# Patient Record
Sex: FEMALE | Race: BLACK OR AFRICAN AMERICAN | Employment: UNEMPLOYED | URBAN - METROPOLITAN AREA
[De-identification: names, ages, dates, MRNs, and addresses within clinical notes are randomized per-mention and may not be internally consistent; named-entity substitution may affect disease eponyms.]

---

## 2017-01-16 ENCOUNTER — HOSPITAL ENCOUNTER (OUTPATIENT)
Dept: GENERAL RADIOLOGY | Age: 32
Discharge: HOME | End: 2017-01-16
Payer: MEDICAID

## 2017-01-16 ENCOUNTER — OFFICE VISIT (OUTPATIENT)
Dept: FAMILY MEDICINE CLINIC | Facility: CLINIC | Age: 32
End: 2017-01-16

## 2017-01-16 VITALS
WEIGHT: 244.3 LBS | HEIGHT: 63 IN | SYSTOLIC BLOOD PRESSURE: 110 MMHG | RESPIRATION RATE: 16 BRPM | BODY MASS INDEX: 43.29 KG/M2 | HEART RATE: 86 BPM | TEMPERATURE: 98.7 F | OXYGEN SATURATION: 98 % | DIASTOLIC BLOOD PRESSURE: 82 MMHG

## 2017-01-16 DIAGNOSIS — R05.9 COUGH: ICD-10-CM

## 2017-01-16 DIAGNOSIS — J30.9 CHRONIC ALLERGIC RHINITIS: ICD-10-CM

## 2017-01-16 DIAGNOSIS — J20.9 BRONCHITIS WITH BRONCHOSPASM: Primary | ICD-10-CM

## 2017-01-16 DIAGNOSIS — J20.9 BRONCHITIS WITH BRONCHOSPASM: ICD-10-CM

## 2017-01-16 PROCEDURE — 71020 XR CHEST PA LAT: CPT

## 2017-01-16 RX ORDER — ALBUTEROL SULFATE 90 UG/1
2 AEROSOL, METERED RESPIRATORY (INHALATION)
Qty: 1 INHALER | Refills: 0 | Status: SHIPPED | OUTPATIENT
Start: 2017-01-16 | End: 2020-06-16

## 2017-01-16 RX ORDER — CLARITHROMYCIN 500 MG/1
500 TABLET, FILM COATED ORAL 2 TIMES DAILY
Qty: 20 TAB | Refills: 0 | Status: SHIPPED | OUTPATIENT
Start: 2017-01-16 | End: 2017-01-26

## 2017-01-16 RX ORDER — CODEINE PHOSPHATE AND GUAIFENESIN 10; 100 MG/5ML; MG/5ML
5 SOLUTION ORAL
Qty: 180 ML | Refills: 0 | Status: SHIPPED | OUTPATIENT
Start: 2017-01-16 | End: 2017-03-17

## 2017-01-16 RX ORDER — FLUTICASONE PROPIONATE 50 MCG
SPRAY, SUSPENSION (ML) NASAL
Qty: 1 BOTTLE | Refills: 2 | Status: SHIPPED | OUTPATIENT
Start: 2017-01-16 | End: 2017-04-13 | Stop reason: SDUPTHER

## 2017-01-16 NOTE — PROGRESS NOTES
Caitlin Gaitan is a 36 y.o.  female presents today for same day sick visit for chest congestion and cough. Pt is in Room # 4.      1. Have you been to the ER, urgent care clinic since your last visit? Hospitalized since your last visit? No    2. Have you seen or consulted any other health care providers outside of the [address] [place] [address] since your last visit? Include any pap smears or colon screening.  No

## 2017-01-16 NOTE — PROGRESS NOTES
SUBJECTIVE:  Regine Yousif is a 36y.o. year old female   Chief Complaint   Patient presents with    Cough       History of Present Illness:     She has had a bad worsening productive cough for the last 2-3 weeks with mild dyspnea and pain w/o chills or fever. The cough is causing problem sleeping. Her rhinitis was not bothering her since on Flonase. She ran out two months ago. Past Medical History   Diagnosis Date    Deviated septum 2012    Multiple nasal polyps 2012     removed      Past Surgical History   Procedure Laterality Date    Hx wisdom teeth extraction  2010    Hx tonsillectomy  2008        Current Outpatient Prescriptions   Medication Sig    PARoxetine (PAXIL) 20 mg tablet TAKE 1 TABLET BY MOUTH EVERY DAY    ethinyl estradiol-etonogestrel (NUVARING) 0.12-0.015 mg/24 hr vaginal ring Insert one device on vagina every four weeks, remove after three weeks.  fluticasone (FLONASE) 50 mcg/actuation nasal spray SHAKE WELL AND USE 2 SPRAYS IN EACH NOSTRIL EVERY DAY     No current facility-administered medications for this visit. No Known Allergies     Family History   Problem Relation Age of Onset    Diabetes Mother         Social History   Substance Use Topics    Smoking status: Never Smoker    Smokeless tobacco: Never Used    Alcohol use No       Review of Systems:   Constitutional: No fever, chills, night sweats, malaise, dizziness. Ear/Nose/Throat: No ear/ throat/ sinus pain, lesions, unusual discharge, new speaking or hearing problems, nose bleed  Cardiovascular: No angina, palpitations, PND, orthopnea,   lightheadedness, edema, claudication. Respiratory: No hemoptysis. Skin: no rash. Gastrointestinal: No nausea/ vomiting, bowel habit change, pain, ANA symptoms, melena, hematochezia, anorexia. Neurological: No seizures, numbness, dizziness, speech abnormality, incontinence. Psychiatric: No agitation, confusion/disorientation, suicidal or homicidal ideation. Musculoskeletal: no joint swelling, instability, focal weakness, stiffness/rigidity, radicular pain. OBJECTIVE:  Physical Exam:   Constitutional: General Appearance:  well developed,obese, nontoxic, in no acute distress. Visit Vitals    /82 (BP 1 Location: Left arm, BP Patient Position: Sitting)    Pulse 86    Temp 98.7 °F (37.1 °C) (Oral)    Resp 16    Ht 5' 3\" (1.6 m)    Wt 244 lb 4.8 oz (110.8 kg)    SpO2 98%    BMI 43.28 kg/m2   Otoscopic Examination: external auditory canals are clear; tympanic membranes are dull. Nasal Cavity: mildly swollen mucosa & turbinates. Maxillas are not tender w/o redness or heat. Throat: clear tonsils, oropharynx, posterior pharynx. Pulmonary: Respiratory effort: normal; no dyspnea, no retractions, no accessory muscle use. Auscultation: no rales, diffuse rhonchi and wheeze; no rubs. Rhonchi and wheeze improved after HHA Rx w/ Duoneb  Cardiovascular: Palpation: PMI not displaced or enlarged, no thrills or heaves. Auscultation: RRR; no murmur, rubs or gallops. Extremities: no edema, no active varicosity. Gastrointestinal: Normal bowel sounds. No masses; no tenderness; no rebound/rigidity; no CVA tenderness. No hepatosplenomegaly. Psychiatric: Oriented to time, place and person. Skin: no rash  Musculoskeletal: NC/AT. Neck supple. ASSESSMENT:     1. Bronchitis with bronchospasm    2. Cough    3. Chronic allergic rhinitis        PLAN:     Orders Placed This Encounter    XR CHEST PA LAT    fluticasone (FLONASE) 50 mcg/actuation nasal spray    clarithromycin (BIAXIN) 500 mg tablet    guaiFENesin-codeine (ROBITUSSIN AC) 100-10 mg/5 mL solution    albuterol (PROVENTIL HFA, VENTOLIN HFA, PROAIR HFA) 90 mcg/actuation inhaler      Pharmacologic Management: Medications reviewed with the patient. Robitussin AC 1 tsp tid prn; Biaxin 500 bid, Flonase 2 daily, Albuterol HFA 2 qid prn. Discussed DDx, follow-up & work-up.   Discussed risk/benefit & side effect of treatment. Follow up visit as planned, prn sooner. PRN to ER. Rest and push fluids. Health risk from non adherence discussed. Patient/ mother voiced understanding. Follow-up Disposition:  Return in about 1 month (around 2/16/2017) for fasting.     Radha Syed MD

## 2017-01-17 ENCOUNTER — TELEPHONE (OUTPATIENT)
Dept: FAMILY MEDICINE CLINIC | Facility: CLINIC | Age: 32
End: 2017-01-17

## 2017-01-20 NOTE — TELEPHONE ENCOUNTER
Spoke with pt in regards to CXR results. Relayed 's notes. Pt acknowledges understanding and voices no concerns at this time.

## 2017-03-17 ENCOUNTER — OFFICE VISIT (OUTPATIENT)
Dept: FAMILY MEDICINE CLINIC | Facility: CLINIC | Age: 32
End: 2017-03-17

## 2017-03-17 ENCOUNTER — HOSPITAL ENCOUNTER (OUTPATIENT)
Dept: LAB | Age: 32
Discharge: HOME | End: 2017-03-17

## 2017-03-17 VITALS
DIASTOLIC BLOOD PRESSURE: 78 MMHG | WEIGHT: 245 LBS | BODY MASS INDEX: 43.41 KG/M2 | TEMPERATURE: 97.6 F | OXYGEN SATURATION: 98 % | RESPIRATION RATE: 18 BRPM | SYSTOLIC BLOOD PRESSURE: 116 MMHG | HEART RATE: 86 BPM | HEIGHT: 63 IN

## 2017-03-17 DIAGNOSIS — R73.09 ELEVATED GLUCOSE: ICD-10-CM

## 2017-03-17 DIAGNOSIS — J30.9 CHRONIC ALLERGIC RHINITIS: ICD-10-CM

## 2017-03-17 DIAGNOSIS — F32.A DEPRESSION, UNSPECIFIED DEPRESSION TYPE: Primary | ICD-10-CM

## 2017-03-17 DIAGNOSIS — G44.229 CHRONIC TENSION-TYPE HEADACHE, NOT INTRACTABLE: ICD-10-CM

## 2017-03-17 PROCEDURE — 99001 SPECIMEN HANDLING PT-LAB: CPT | Performed by: FAMILY MEDICINE

## 2017-03-17 NOTE — PROGRESS NOTES
1. Have you been to the ER, urgent care clinic since your last visit? Hospitalized since your last visit? No    2. Have you seen or consulted any other health care providers outside of the [address] [place] [address] since your last visit? Include any pap smears or colon screening.  No

## 2017-03-17 NOTE — PROGRESS NOTES
SUBJECTIVE:  Nino Liriano is a 36y.o. year old female   Chief Complaint   Patient presents with    Depression    Allergic Rhinitis    Follow-up     cough       History of Present Illness:   She is here for follow up. No side effects of Paxil. Sleeping better and mood is better. No depression. She is following diet and exercise for elevated BS and lipids. .  Her headache is controlled. Her rhinitis is not bothering her since on Flonase. No more cough or wheeze. Her back pain has not returned    Past Medical History:   Diagnosis Date    Deviated septum 2012    Multiple nasal polyps 2012    removed      Past Surgical History:   Procedure Laterality Date    HX TONSILLECTOMY  2008    HX WISDOM TEETH EXTRACTION  2010        Current Outpatient Prescriptions   Medication Sig    fluticasone (FLONASE) 50 mcg/actuation nasal spray SHAKE WELL AND USE 2 SPRAYS IN EACH NOSTRIL EVERY DAY    albuterol (PROVENTIL HFA, VENTOLIN HFA, PROAIR HFA) 90 mcg/actuation inhaler Take 2 Puffs by inhalation every six (6) hours as needed for Wheezing.  PARoxetine (PAXIL) 20 mg tablet TAKE 1 TABLET BY MOUTH EVERY DAY    ethinyl estradiol-etonogestrel (NUVARING) 0.12-0.015 mg/24 hr vaginal ring Insert one device on vagina every four weeks, remove after three weeks. No current facility-administered medications for this visit. No Known Allergies     Family History   Problem Relation Age of Onset    Diabetes Mother         Social History   Substance Use Topics    Smoking status: Never Smoker    Smokeless tobacco: Never Used    Alcohol use No       Review of Systems:   Constitutional: No fever, chills, night sweats, malaise, dizziness. Ear/Nose/Throat: No ear/ throat/ sinus pain, lesions, unusual discharge, new speaking or hearing problems, nose bleed  Cardiovascular: No angina, palpitations, PND, orthopnea, lightheadedness, edema, claudication.     Respiratory: No dyspnea, wheeze, pleurisy, hemoptysis, unusual cough or sputum. Gastrointestinal: No nausea/ vomiting, bowel habit change, pain, ANA symptoms, melena, hematochezia, anorexia. Neurological: No seizures, numbness, dizziness, speech abnormality, incontinence. Psychiatric: No agitation, confusion/disorientation, suicidal or homicidal ideation. Endocrine: Denies any other complaints. Musculoskeletal: no joint swelling, instability, focal weakness, stiffness/rigidity, radicular pain. OBJECTIVE:  Physical Exam:   Constitutional: General Appearance:  well developed,obese, nontoxic, in no acute distress. Visit Vitals    /78 (BP 1 Location: Left arm, BP Patient Position: Sitting)    Pulse 86    Temp 97.6 °F (36.4 °C) (Oral)    Resp 18    Ht 5' 3\" (1.6 m)    Wt 245 lb (111.1 kg)    SpO2 98%    BMI 43.4 kg/m2     Eyes[de-identified] Pupils & Irises: normal size; equal, round and reactive to light. EOMI.    ENT: Ear/Nose/Throat: No ear/ throat/ sinus pain, lesions, unusual discharge, new speaking or hearing problems, nose bleed    Neck Area[de-identified] Neck: without masses, symmetric, trachea is midline. Thyroid:  without masses or tenderness. Pulmonary: Respiratory effort: normal; no dyspnea, no retractions, no accessory muscle use. Auscultation: normal & symmetrical air exchange; no rales, no rhonchi, no wheeze; no rubs    Cardiovascular: Palpation: PMI not displaced or enlarged, no thrills or heaves. Auscultation: RRR; no murmur, rubs or gallops. Extremities: no edema, no active varicosity. Gastrointestinal: Normal bowel sounds. No masses; no tenderness; no rebound/rigidity; no CVA tenderness. No hepatosplenomegaly. Psychiatric: Oriented to time, place and person. Normal mood, no agitation or anxiety. Improving affect. Pleasant and cooperative. Neurologic: CN I to XII: intact. DTR: symmetrical & normal.  SLR- neg    Musculoskeletal: NC/AT. Neck supple.     Lab Results   Component Value Date/Time    TSH 2.630 08/15/2016 08:35 AM Lab Results   Component Value Date/Time    WBC 6.4 02/23/2016 11:24 AM    HGB 12.4 02/23/2016 11:24 AM    HCT 37.0 02/23/2016 11:24 AM    PLATELET 166 50/94/1648 11:24 AM    MCV 88 02/23/2016 11:24 AM     Lab Results   Component Value Date/Time    Sodium 143 02/23/2016 11:24 AM    Potassium 4.4 02/23/2016 11:24 AM    Chloride 105 02/23/2016 11:24 AM    CO2 23 02/23/2016 11:24 AM    Glucose 117 08/15/2016 08:35 AM    BUN 10 02/23/2016 11:24 AM    Creatinine 0.75 02/23/2016 11:24 AM    BUN/Creatinine ratio 13 02/23/2016 11:24 AM    GFR est  02/23/2016 11:24 AM    GFR est non- 02/23/2016 11:24 AM    Calcium 9.6 02/23/2016 11:24 AM    Bilirubin, total 0.2 02/23/2016 11:24 AM    AST (SGOT) 11 02/23/2016 11:24 AM    Alk. phosphatase 44 02/23/2016 11:24 AM    Protein, total 6.9 02/23/2016 11:24 AM    Albumin 4.0 02/23/2016 11:24 AM    A-G Ratio 1.4 02/23/2016 11:24 AM    ALT (SGPT) 11 02/23/2016 11:24 AM     Lab Results   Component Value Date/Time    Cholesterol, total 139 05/13/2016 08:45 AM    HDL Cholesterol 55 05/13/2016 08:45 AM    LDL, calculated 63 05/13/2016 08:45 AM    VLDL, calculated 21 05/13/2016 08:45 AM    Triglyceride 106 05/13/2016 08:45 AM     Lab Results   Component Value Date/Time    Hemoglobin A1c 6.4 08/15/2016 08:35 AM       ASSESSMENT:     1. Depression, unspecified depression type    2. Elevated glucose    3. Chronic allergic rhinitis    4. Chronic tension-type headache, not intractable, improved        PLAN:     Orders Placed This Encounter    CBC WITH AUTOMATED DIFF    METABOLIC PANEL, COMPREHENSIVE    HEMOGLOBIN A1C WITH EAG    URINALYSIS W/ RFLX MICROSCOPIC    LIPID PANEL    TETANUS, DIPHTHERIA TOX,ADULT (TETANUS-DIPHTHERIA TOXOIDS-TD) 2-2 Lf unit/0.5 mL injection   Per patient, has had Tdap since age 8 and last Td 10 years ago. Pharmacologic Management: Medications reviewed with the patient. No change    Discussed DDx, follow-up & work-up.   Discussed risk/benefit & side effect of treatment. Follow up visit as planned, prn sooner. Low ADA salt diet, weightloss & graduated excecise. Health risk from non adherence discussed. Patient/ mother voiced understanding. Follow-up Disposition:  Return in about 6 months (around 9/17/2017).     Catalina Wang MD

## 2017-03-18 LAB
ALBUMIN SERPL-MCNC: 4.1 G/DL (ref 3.5–5.5)
ALBUMIN/GLOB SERPL: 1.6 {RATIO} (ref 1.2–2.2)
ALP SERPL-CCNC: 56 IU/L (ref 39–117)
ALT SERPL-CCNC: 13 IU/L (ref 0–32)
APPEARANCE UR: CLEAR
AST SERPL-CCNC: 11 IU/L (ref 0–40)
BACTERIA #/AREA URNS HPF: ABNORMAL /[HPF]
BASOPHILS # BLD AUTO: 0 X10E3/UL (ref 0–0.2)
BASOPHILS NFR BLD AUTO: 0 %
BILIRUB SERPL-MCNC: <0.2 MG/DL (ref 0–1.2)
BILIRUB UR QL STRIP: NEGATIVE
BUN SERPL-MCNC: 8 MG/DL (ref 6–20)
BUN/CREAT SERPL: 12 (ref 8–20)
CALCIUM SERPL-MCNC: 9.1 MG/DL (ref 8.7–10.2)
CASTS URNS QL MICRO: ABNORMAL /LPF
CHLORIDE SERPL-SCNC: 101 MMOL/L (ref 96–106)
CHOLEST SERPL-MCNC: 140 MG/DL (ref 100–199)
CO2 SERPL-SCNC: 24 MMOL/L (ref 18–29)
COLOR UR: YELLOW
CREAT SERPL-MCNC: 0.67 MG/DL (ref 0.57–1)
EOSINOPHIL # BLD AUTO: 0 X10E3/UL (ref 0–0.4)
EOSINOPHIL NFR BLD AUTO: 1 %
EPI CELLS #/AREA URNS HPF: ABNORMAL /HPF
ERYTHROCYTE [DISTWIDTH] IN BLOOD BY AUTOMATED COUNT: 13.7 % (ref 12.3–15.4)
EST. AVERAGE GLUCOSE BLD GHB EST-MCNC: 137 MG/DL
GLOBULIN SER CALC-MCNC: 2.6 G/DL (ref 1.5–4.5)
GLUCOSE SERPL-MCNC: 96 MG/DL (ref 65–99)
GLUCOSE UR QL: NEGATIVE
HBA1C MFR BLD: 6.4 % (ref 4.8–5.6)
HCT VFR BLD AUTO: 36.3 % (ref 34–46.6)
HDLC SERPL-MCNC: 56 MG/DL
HGB BLD-MCNC: 11.9 G/DL (ref 11.1–15.9)
HGB UR QL STRIP: NEGATIVE
IMM GRANULOCYTES # BLD: 0 X10E3/UL (ref 0–0.1)
IMM GRANULOCYTES NFR BLD: 0 %
INTERPRETATION, 910389: NORMAL
KETONES UR QL STRIP: NEGATIVE
LDLC SERPL CALC-MCNC: 64 MG/DL (ref 0–99)
LEUKOCYTE ESTERASE UR QL STRIP: ABNORMAL
LYMPHOCYTES # BLD AUTO: 2.4 X10E3/UL (ref 0.7–3.1)
LYMPHOCYTES NFR BLD AUTO: 38 %
MCH RBC QN AUTO: 27.9 PG (ref 26.6–33)
MCHC RBC AUTO-ENTMCNC: 32.8 G/DL (ref 31.5–35.7)
MCV RBC AUTO: 85 FL (ref 79–97)
MICRO URNS: ABNORMAL
MONOCYTES # BLD AUTO: 0.4 X10E3/UL (ref 0.1–0.9)
MONOCYTES NFR BLD AUTO: 7 %
MUCOUS THREADS URNS QL MICRO: PRESENT
NEUTROPHILS # BLD AUTO: 3.3 X10E3/UL (ref 1.4–7)
NEUTROPHILS NFR BLD AUTO: 54 %
NITRITE UR QL STRIP: NEGATIVE
PH UR STRIP: 6.5 [PH] (ref 5–7.5)
PLATELET # BLD AUTO: 300 X10E3/UL (ref 150–379)
POTASSIUM SERPL-SCNC: 4.5 MMOL/L (ref 3.5–5.2)
PROT SERPL-MCNC: 6.7 G/DL (ref 6–8.5)
PROT UR QL STRIP: NEGATIVE
RBC # BLD AUTO: 4.27 X10E6/UL (ref 3.77–5.28)
RBC #/AREA URNS HPF: ABNORMAL /HPF
SODIUM SERPL-SCNC: 138 MMOL/L (ref 134–144)
SP GR UR: 1.02 (ref 1–1.03)
TRIGL SERPL-MCNC: 99 MG/DL (ref 0–149)
UROBILINOGEN UR STRIP-MCNC: 0.2 MG/DL (ref 0.2–1)
VLDLC SERPL CALC-MCNC: 20 MG/DL (ref 5–40)
WBC # BLD AUTO: 6.2 X10E3/UL (ref 3.4–10.8)
WBC #/AREA URNS HPF: ABNORMAL /HPF

## 2017-03-20 ENCOUNTER — TELEPHONE (OUTPATIENT)
Dept: FAMILY MEDICINE CLINIC | Facility: CLINIC | Age: 32
End: 2017-03-20

## 2017-03-20 DIAGNOSIS — N39.0 URINARY TRACT INFECTION WITHOUT HEMATURIA, SITE UNSPECIFIED: Primary | ICD-10-CM

## 2017-03-20 NOTE — TELEPHONE ENCOUNTER
Called pt and left message. Call back number left and I myself or one of the other nurses will attempt to contact again. The call was to inform pt of lab results.

## 2017-03-21 NOTE — TELEPHONE ENCOUNTER
HgbA1c was unchanged at 6.4. U/A shows possible infection.  Will need urine for C&S. Called pt and left message. Call back number left and I myself or one of the other nurses will attempt to contact again.     The call was to inform pt results

## 2017-03-22 NOTE — TELEPHONE ENCOUNTER
Spoke with pt in regards to results. Pt acknowledges understanding and voices no concerns at this time. VORB:Urine culture. Brooklynn Veliz MD/Carmina Taylor, LPN

## 2017-03-24 LAB — BACTERIA UR CULT: NORMAL

## 2017-04-13 RX ORDER — FLUTICASONE PROPIONATE 50 MCG
SPRAY, SUSPENSION (ML) NASAL
Qty: 1 BOTTLE | Refills: 2 | Status: SHIPPED | OUTPATIENT
Start: 2017-04-13 | End: 2017-05-08 | Stop reason: SDUPTHER

## 2017-04-20 ENCOUNTER — OFFICE VISIT (OUTPATIENT)
Dept: INTERNAL MEDICINE CLINIC | Age: 32
End: 2017-04-20

## 2017-04-20 VITALS
RESPIRATION RATE: 16 BRPM | DIASTOLIC BLOOD PRESSURE: 94 MMHG | HEART RATE: 81 BPM | TEMPERATURE: 97.2 F | WEIGHT: 246.6 LBS | BODY MASS INDEX: 43.7 KG/M2 | OXYGEN SATURATION: 99 % | SYSTOLIC BLOOD PRESSURE: 145 MMHG | HEIGHT: 63 IN

## 2017-04-20 DIAGNOSIS — H66.004 RECURRENT ACUTE SUPPURATIVE OTITIS MEDIA OF RIGHT EAR WITHOUT SPONTANEOUS RUPTURE OF TYMPANIC MEMBRANE: Primary | ICD-10-CM

## 2017-04-20 PROBLEM — H66.001 ACUTE SUPPURATIVE OTITIS MEDIA OF RIGHT EAR WITHOUT SPONTANEOUS RUPTURE OF TYMPANIC MEMBRANE: Status: ACTIVE | Noted: 2017-04-20

## 2017-04-20 RX ORDER — OFLOXACIN 3 MG/ML
5 SOLUTION AURICULAR (OTIC) DAILY
Qty: 5 ML | Refills: 0 | Status: SHIPPED | OUTPATIENT
Start: 2017-04-20 | End: 2017-04-21 | Stop reason: SDUPTHER

## 2017-04-20 RX ORDER — IBUPROFEN 800 MG/1
800 TABLET ORAL
Qty: 45 TAB | Refills: 0 | Status: SHIPPED | OUTPATIENT
Start: 2017-04-20 | End: 2017-05-05

## 2017-04-20 RX ORDER — AMOXICILLIN AND CLAVULANATE POTASSIUM 875; 125 MG/1; MG/1
1 TABLET, FILM COATED ORAL 2 TIMES DAILY
Qty: 14 TAB | Refills: 0 | Status: SHIPPED | OUTPATIENT
Start: 2017-04-20 | End: 2017-04-27

## 2017-04-20 NOTE — PROGRESS NOTES
HISTORY OF PRESENT ILLNESS  Echo De La Cruz is a 29 y.o. female. Ear Pain   The history is provided by the patient. This is a new problem. The current episode started more than 2 days ago. The problem occurs constantly. The problem has been gradually worsening. Associated symptoms include headaches. Pertinent negatives include no chest pain and no shortness of breath. Associated symptoms comments: difficulty hearing. Nothing aggravates the symptoms. Nothing relieves the symptoms. She has tried nothing for the symptoms. The treatment provided no relief. Review of Systems   Constitutional: Negative for chills, fever and malaise/fatigue. HENT: Positive for congestion, ear discharge, ear pain, hearing loss and sore throat. Negative for nosebleeds and tinnitus. Eyes: Negative for blurred vision and double vision. Respiratory: Positive for cough and sputum production. Negative for shortness of breath, wheezing and stridor. Cardiovascular: Negative for chest pain and palpitations. Gastrointestinal: Negative for heartburn, nausea and vomiting. Musculoskeletal: Negative for myalgias. Skin: Negative for itching and rash. Neurological: Positive for headaches. Negative for dizziness and tingling. Endo/Heme/Allergies: Negative for environmental allergies and polydipsia. Psychiatric/Behavioral: Negative for depression. The patient is not nervous/anxious. Physical Exam   Constitutional: She is oriented to person, place, and time. She appears well-developed. HENT:   Head: Normocephalic and atraumatic. Right Ear: No lacerations. There is drainage, swelling and tenderness. Tympanic membrane is bulging. Tympanic membrane is not scarred and not perforated. A middle ear effusion is present. Decreased hearing is noted. Left Ear: No drainage, swelling or tenderness. Tympanic membrane is not scarred and not perforated. No decreased hearing is noted.    Mouth/Throat: Mucous membranes are normal. No oropharyngeal exudate, posterior oropharyngeal edema, posterior oropharyngeal erythema or tonsillar abscesses. Eyes: EOM are normal. Pupils are equal, round, and reactive to light. Neck: Neck supple. No JVD present. Cardiovascular: Regular rhythm. Pulmonary/Chest: Breath sounds normal.   Lymphadenopathy:        Head (right side): Submandibular, tonsillar and preauricular adenopathy present. Head (left side): No submandibular, no tonsillar and no preauricular adenopathy present. Neurological: She is alert and oriented to person, place, and time. No cranial nerve deficit. Skin: Skin is dry. No erythema. Psychiatric: She has a normal mood and affect. Nursing note and vitals reviewed. ASSESSMENT and PLAN    ICD-10-CM ICD-9-CM    1. Recurrent acute suppurative otitis media of right ear without spontaneous rupture of tympanic membrane H66.004 382.00 amoxicillin-clavulanate (AUGMENTIN) 875-125 mg per tablet      ibuprofen (MOTRIN) 800 mg tablet      ofloxacin (FLOXIN) 0.3 % otic solution   Patient advised to take medication as prescribed. Take rest and plenty of fluids. Take  ibuprofen for fever and pain. Return to clinic if condition worsens in next 72 hours.

## 2017-04-20 NOTE — MR AVS SNAPSHOT
Visit Information Date & Time Provider Department Dept. Phone Encounter #  
 4/20/2017  1:15 PM Scooby NORBERT Gaitan [employer] [phone] [id number] Upcoming Health Maintenance Date Due DTaP/Tdap/Td series (1 - Tdap) 4/4/2008 PAP AKA CERVICAL CYTOLOGY 4/23/2018 Allergies as of 4/20/2017  Review Complete On: 4/20/2017 By: Jessica Gray Severity Noted Reaction Type Reactions Asa-acetaminophen-caff-buffers High 04/20/2017    Anaphylaxis Current Immunizations  Reviewed on 9/3/2015 Name Date Influenza Vaccine (Quad) PF 9/3/2015 11:43 AM  
  
 Not reviewed this visit You Were Diagnosed With   
  
 Codes Comments Recurrent acute suppurative otitis media of right ear without spontaneous rupture of tympanic membrane    -  Primary ICD-10-CM: H66.004 ICD-9-CM: 382.00 Vitals BP Pulse Temp Resp Height(growth percentile) Weight(growth percentile) (!) 145/94 (BP 1 Location: Left arm, BP Patient Position: Sitting) 81 97.2 °F (36.2 °C) (Oral) 16 5' 3\" (1.6 m) 246 lb 9.6 oz (111.9 kg) LMP SpO2 BMI OB Status Smoking Status 04/20/2017 (Approximate) 99% 43.68 kg/m2 Having regular periods Never Smoker Vitals History BMI and BSA Data Body Mass Index Body Surface Area  
 43.68 kg/m 2 2.23 m 2 Preferred Pharmacy Pharmacy Name Phone [facility] [address] [phone] Your Updated Medication List  
  
   
This list is accurate as of: 4/20/17  1:34 PM.  Always use your most recent med list.  
  
  
  
  
 albuterol 90 mcg/actuation inhaler Commonly known as:  PROVENTIL HFA, VENTOLIN HFA, PROAIR HFA Take 2 Puffs by inhalation every six (6) hours as needed for Wheezing. amoxicillin-clavulanate 875-125 mg per tablet Commonly known as:  AUGMENTIN Take 1 Tab by mouth two (2) times a day for 7 days. ethinyl estradiol-etonogestrel 0.12-0.015 mg/24 hr vaginal ring Commonly known as:  Orin Horta Insert one device on vagina every four weeks, remove after three weeks. fluticasone 50 mcg/actuation nasal spray Commonly known as:  Ala Lips SHAKE LIQUID AND USE 2 SPRAYS IN EACH NOSTRIL EVERY DAY  
  
 ibuprofen 800 mg tablet Commonly known as:  MOTRIN Take 1 Tab by mouth every eight (8) hours as needed for Pain for up to 15 days. ofloxacin 0.3 % otic solution Commonly known as:  FLOXIN Administer 5 Drops in left ear daily for 7 days. PARoxetine 20 mg tablet Commonly known as:  PAXIL TAKE 1 TABLET BY MOUTH EVERY DAY Prescriptions Sent to Pharmacy Refills  
 amoxicillin-clavulanate (AUGMENTIN) 875-125 mg per tablet 0 Sig: Take 1 Tab by mouth two (2) times a day for 7 days. Class: Normal  
 Pharmacy: [address] Ph #: [phone] Route: Oral  
 ibuprofen (MOTRIN) 800 mg tablet 0 Sig: Take 1 Tab by mouth every eight (8) hours as needed for Pain for up to 15 days. Class: Normal  
 Pharmacy: [address] Ph #: [phone] Route: Oral  
 ofloxacin (FLOXIN) 0.3 % otic solution 0 Sig: Administer 5 Drops in left ear daily for 7 days. Class: Normal  
 Pharmacy: [address] Ph #: [phone] Route: Left Ear Patient Instructions Ear Infection (Otitis Media): Care Instructions Your Care Instructions An ear infection may start with a cold and affect the middle ear (otitis media). It can hurt a lot. Most ear infections clear up on their own in a couple of days. Most often you will not need antibiotics.  This is because many ear infections are caused by a virus. Antibiotics don't work against a virus. Regular doses of pain medicines are the best way to reduce your fever and help you feel better. Follow-up care is a key part of your treatment and safety. Be sure to make and go to all appointments, and call your doctor if you are having problems. It's also a good idea to know your test results and keep a list of the medicines you take. How can you care for yourself at home? · Take pain medicines exactly as directed. ¨ If the doctor gave you a prescription medicine for pain, take it as prescribed. ¨ If you are not taking a prescription pain medicine, take an over-the-counter medicine, such as acetaminophen (Tylenol), ibuprofen (Advil, Motrin), or naproxen (Aleve). Read and follow all instructions on the label. ¨ Do not take two or more pain medicines at the same time unless the doctor told you to. Many pain medicines have acetaminophen, which is Tylenol. Too much acetaminophen (Tylenol) can be harmful. · Plan to take a full dose of pain reliever before bedtime. Getting enough sleep will help you get better. · Try a warm, moist washcloth on the ear. It may help relieve pain. · If your doctor prescribed antibiotics, take them as directed. Do not stop taking them just because you feel better. You need to take the full course of antibiotics. When should you call for help? Call your doctor now or seek immediate medical care if: 
· You have new or increasing ear pain. · You have new or increasing pus or blood draining from your ear. · You have a fever with a stiff neck or a severe headache. Watch closely for changes in your health, and be sure to contact your doctor if: 
· You have new or worse symptoms. · You are not getting better after taking an antibiotic for 2 days. Where can you learn more? Go to http://adriel-nora.info/.  
Enter A504 in the search box to learn more about \"Ear Infection (Otitis Media): Care Instructions. \" Current as of: July 29, 2016 Content Version: 11.2 © 2521-2233 [employer]. Care instructions adapted under license by [employer] (which disclaims liability or warranty for this information). If you have questions about a medical condition or this instruction, always ask your healthcare professional. [address] any warranty or liability for your use of this information. Introducing [facility]! Dear Yimi Clinton: Thank you for requesting a [employer] account. Our records indicate that you already have an active [employer] account. You can access your account anytime at [email or web address] Did you know that you can access your hospital and ER discharge instructions at any time in [employer]? You can also review all of your test results from your hospital stay or ER visit. Additional Information If you have questions, please visit the Frequently Asked Questions section of the [employer] website at [email or web address] Remember, [employer] is NOT to be used for urgent needs. For medical emergencies, dial 911. Now available from your iPhone and Android! Please provide this summary of care documentation to your next provider. Your primary care clinician is listed as [address] [place] [address]. If you have any questions after today's visit, please call [phone].

## 2017-04-20 NOTE — PATIENT INSTRUCTIONS
Ear Infection (Otitis Media): Care Instructions  Your Care Instructions    An ear infection may start with a cold and affect the middle ear (otitis media). It can hurt a lot. Most ear infections clear up on their own in a couple of days. Most often you will not need antibiotics. This is because many ear infections are caused by a virus. Antibiotics don't work against a virus. Regular doses of pain medicines are the best way to reduce your fever and help you feel better. Follow-up care is a key part of your treatment and safety. Be sure to make and go to all appointments, and call your doctor if you are having problems. It's also a good idea to know your test results and keep a list of the medicines you take. How can you care for yourself at home? · Take pain medicines exactly as directed. ¨ If the doctor gave you a prescription medicine for pain, take it as prescribed. ¨ If you are not taking a prescription pain medicine, take an over-the-counter medicine, such as acetaminophen (Tylenol), ibuprofen (Advil, Motrin), or naproxen (Aleve). Read and follow all instructions on the label. ¨ Do not take two or more pain medicines at the same time unless the doctor told you to. Many pain medicines have acetaminophen, which is Tylenol. Too much acetaminophen (Tylenol) can be harmful. · Plan to take a full dose of pain reliever before bedtime. Getting enough sleep will help you get better. · Try a warm, moist washcloth on the ear. It may help relieve pain. · If your doctor prescribed antibiotics, take them as directed. Do not stop taking them just because you feel better. You need to take the full course of antibiotics. When should you call for help? Call your doctor now or seek immediate medical care if:  · You have new or increasing ear pain. · You have new or increasing pus or blood draining from your ear. · You have a fever with a stiff neck or a severe headache.   Watch closely for changes in your health, and be sure to contact your doctor if:  · You have new or worse symptoms. · You are not getting better after taking an antibiotic for 2 days. Where can you learn more? Go to http://adriel-nora.info/. Enter G425 in the search box to learn more about \"Ear Infection (Otitis Media): Care Instructions. \"  Current as of: July 29, 2016  Content Version: 11.2  © 2335-9974 [employer]. Care instructions adapted under license by [employer] (which disclaims liability or warranty for this information). If you have questions about a medical condition or this instruction, always ask your healthcare professional. [address] any warranty or liability for your use of this information.

## 2017-04-20 NOTE — PROGRESS NOTES
Pt presented today with right ear pain and some hearing loss x 2 days . Has pt had any falls since last visit? no.  Pt preferred language for health care discussion is english. Advanced Directive? no    Is someone accompanying this pt? Yes/ mother     Is the patient using any DME equipment during OV? no      1. Have you been to the ER, urgent care clinic since your last visit? Hospitalized since your last visit? No    2. Have you seen or consulted any other health care providers outside of the [address] [place] [address] since your last visit? Include any pap smears or colon screening. No      Patient  has a reminder for a \"due or due soon\" health maintenance. I have asked that she contact his primary care provider for follow-up on this health maintenance.

## 2017-04-21 DIAGNOSIS — H66.004 RECURRENT ACUTE SUPPURATIVE OTITIS MEDIA OF RIGHT EAR WITHOUT SPONTANEOUS RUPTURE OF TYMPANIC MEMBRANE: ICD-10-CM

## 2017-04-21 NOTE — TELEPHONE ENCOUNTER
Requested Prescriptions     Pending Prescriptions Disp Refills    ofloxacin (FLOXIN) 0.3 % otic solution 5 mL 0     Sig: Administer 5 Drops in left ear daily for 7 days.

## 2017-04-24 ENCOUNTER — TELEPHONE (OUTPATIENT)
Dept: INTERNAL MEDICINE CLINIC | Age: 32
End: 2017-04-24

## 2017-04-24 RX ORDER — OFLOXACIN 3 MG/ML
5 SOLUTION AURICULAR (OTIC) DAILY
Qty: 5 ML | Refills: 0 | Status: SHIPPED | OUTPATIENT
Start: 2017-04-24 | End: 2017-04-29 | Stop reason: ALTCHOICE

## 2017-04-24 NOTE — TELEPHONE ENCOUNTER
Patient said she was prescribed a ear drop that her insurance doesn't cover.  Please call her at [phone]

## 2017-04-25 NOTE — TELEPHONE ENCOUNTER
Pharm contacted. 2 pt identifiers confirmed. Was informed that pt picked up prescription for ear drops on 04/24/2017. Pt contacted at home number to verify. 2 pt identifiers confirmed. Pt states medication was approved and was picked up on 04/24/2017.

## 2017-04-28 NOTE — TELEPHONE ENCOUNTER
Contacted prior [facility] department spoke with Adenike Half. Two patient Identifiers confirmed. She stated that there was already a paid claim for medication on 4/24/2017 with a copay of $0.  No other issue noted. Vitor Luo

## 2017-04-29 ENCOUNTER — OFFICE VISIT (OUTPATIENT)
Dept: INTERNAL MEDICINE CLINIC | Age: 32
End: 2017-04-29

## 2017-04-29 VITALS
TEMPERATURE: 97.7 F | OXYGEN SATURATION: 97 % | WEIGHT: 249.2 LBS | HEIGHT: 63 IN | BODY MASS INDEX: 44.16 KG/M2 | HEART RATE: 93 BPM | SYSTOLIC BLOOD PRESSURE: 125 MMHG | DIASTOLIC BLOOD PRESSURE: 86 MMHG | RESPIRATION RATE: 16 BRPM

## 2017-04-29 DIAGNOSIS — Z09 OTITIS MEDIA FOLLOW-UP, INFECTION RESOLVED: Primary | ICD-10-CM

## 2017-04-29 DIAGNOSIS — Z86.69 OTITIS MEDIA FOLLOW-UP, INFECTION RESOLVED: Primary | ICD-10-CM

## 2017-04-29 NOTE — PROGRESS NOTES
ROOM # 8    Albaro Calix presents today for   Chief Complaint   Patient presents with    Ear Pain     F/u for R ear. antibx finished yesterday. Regained hearing from infx induced hearing loss. Devon Salcido preferred language for health care discussion is english/other. Is someone accompanying this pt? no    Is the patient using any DME equipment during OV? no    Depression Screening:  PHQ 2 / 9, over the last two weeks 11/3/2015 5/18/2015   Little interest or pleasure in doing things Not at all Not at all   Feeling down, depressed or hopeless Not at all Not at all   Total Score PHQ 2 0 0       Learning Assessment:  Learning Assessment 2/3/2016   PRIMARY LEARNER Patient   HIGHEST LEVEL OF EDUCATION - PRIMARY LEARNER  SOME COLLEGE   BARRIERS PRIMARY LEARNER NONE   PRIMARY LANGUAGE ENGLISH   LEARNER PREFERENCE PRIMARY VIDEOS     READING   ANSWERED BY patient   RELATIONSHIP SELF       Abuse Screening:  Abuse Screening Questionnaire 2/3/2016   Do you ever feel afraid of your partner? N   Are you in a relationship with someone who physically or mentally threatens you? N   Is it safe for you to go home? Y       Fall Risk  Fall Risk Assessment, last 12 mths 11/3/2015   Able to walk? Yes   Fall in past 12 months? No       Health Maintenance reviewed and discussed per provider. Yes      Advance Directive:  1. Do you have an advance directive in place? Patient Reply: no    2. If not, would you like material regarding how to put one in place? Patient Reply: no    Coordination of Care:  1. Have you been to the ER, urgent care clinic since your last visit? Hospitalized since your last visit? no    2. Have you seen or consulted any other health care providers outside of the [address] [place] [address] since your last visit? Include any pap smears or colon screening.  no

## 2017-04-29 NOTE — MR AVS SNAPSHOT
Visit Information Date & Time Provider Department Dept. Phone Encounter #  
 4/29/2017  8:30 AM Nasrin Carbajal NP [facility] [phone] [id number] Your Appointments 5/5/2017  1:15 PM  
ANNUAL with Isai Baldwin DO  
[address] [place] [address] ([address] [place] [address]) Appt Note: ANNUAL  
 [phone]  
  
   
 [phone] Upcoming Health Maintenance Date Due  
 PAP AKA CERVICAL CYTOLOGY 4/23/2018 DTaP/Tdap/Td series (2 - Td) 3/28/2027 Allergies as of 4/29/2017  Review Complete On: 4/29/2017 By: Alesha Blair LPN Severity Noted Reaction Type Reactions Asa-acetaminophen-caff-buffers High 04/20/2017    Anaphylaxis Current Immunizations  Reviewed on 9/3/2015 Name Date Influenza Vaccine (Quad) PF 9/3/2015 11:43 AM  
  
 Not reviewed this visit You Were Diagnosed With   
  
 Codes Comments Otitis media follow-up, infection resolved    -  Primary ICD-10-CM: S82, Z86.69 
ICD-9-CM: V67.59, V12.40 Vitals BP Pulse Temp Resp Height(growth percentile) Weight(growth percentile) 125/86 (BP 1 Location: Left arm, BP Patient Position: Sitting) 93 97.7 °F (36.5 °C) (Oral) 16 5' 3\" (1.6 m) 249 lb 3.2 oz (113 kg) LMP SpO2 BMI OB Status Smoking Status 04/20/2017 (Approximate) 97% 44.14 kg/m2 Having regular periods Never Smoker Vitals History BMI and BSA Data Body Mass Index Body Surface Area  
 44.14 kg/m 2 2.24 m 2 Preferred Pharmacy Pharmacy Name Phone [facility] [place], [address] [place] [address] [phone] Your Updated Medication List  
  
   
This list is accurate as of: 4/29/17  8:57 AM.  Always use your most recent med list.  
  
  
  
  
 albuterol 90 mcg/actuation inhaler Commonly known as:  PROVENTIL HFA, VENTOLIN HFA, PROAIR HFA  
 Take 2 Puffs by inhalation every six (6) hours as needed for Wheezing.  
  
 ethinyl estradiol-etonogestrel 0.12-0.015 mg/24 hr vaginal ring Commonly known as:  Fredrick Lechuga Insert one device on vagina every four weeks, remove after three weeks. fluticasone 50 mcg/actuation nasal spray Commonly known as:  Diane Garcia SHAKE LIQUID AND USE 2 SPRAYS IN EACH NOSTRIL EVERY DAY  
  
 ibuprofen 800 mg tablet Commonly known as:  MOTRIN Take 1 Tab by mouth every eight (8) hours as needed for Pain for up to 15 days. PARoxetine 20 mg tablet Commonly known as:  PAXIL TAKE 1 TABLET BY MOUTH EVERY DAY Introducing [facility] & [facility] SERVICES! Dear Bunny Bradford: Thank you for requesting a [employer] account. Our records indicate that you already have an active [employer] account. You can access your account anytime at [email or web address] Did you know that you can access your hospital and ER discharge instructions at any time in Silistix? You can also review all of your test results from your hospital stay or ER visit. Additional Information If you have questions, please visit the Frequently Asked Questions section of the [employer]istix website at [email or web address] Remember, Silistix is NOT to be used for urgent needs. For medical emergencies, dial 911. Now available from your iPhone and Android! Please provide this summary of care documentation to your next provider. Your primary care clinician is listed as [address] [place] [address]. If you have any questions after today's visit, please call [phone].

## 2017-04-29 NOTE — PROGRESS NOTES
HISTORY OF PRESENT ILLNESS  Brandi Ann is a 29 y.o. female. HPI Comments: Patient vignesh here for follow up on otitis media. Feeling better. No other complaints. Ear Pain   Pertinent negatives include no chest pain, no headaches and no shortness of breath. Review of Systems   Constitutional: Negative for chills, fever and malaise/fatigue. HENT: Negative for congestion, ear discharge, ear pain, hearing loss, sore throat and tinnitus. Eyes: Negative for blurred vision and double vision. Respiratory: Negative for cough and shortness of breath. Cardiovascular: Negative for chest pain and palpitations. Gastrointestinal: Negative for heartburn, nausea and vomiting. Genitourinary: Negative for dysuria, frequency and urgency. Musculoskeletal: Negative for myalgias. Neurological: Negative for dizziness, tingling and headaches. Endo/Heme/Allergies: Negative for environmental allergies and polydipsia. Psychiatric/Behavioral: The patient is not nervous/anxious. Physical Exam   Constitutional: She is oriented to person, place, and time. She appears well-developed. HENT:   Head: Normocephalic and atraumatic. Right Ear: External ear normal.   Left Ear: External ear normal.   Mouth/Throat: Oropharynx is clear and moist. No oropharyngeal exudate. Eyes: Pupils are equal, round, and reactive to light. Cardiovascular: Regular rhythm. Pulmonary/Chest: Breath sounds normal.   Neurological: She is alert and oriented to person, place, and time. Psychiatric: She has a normal mood and affect. Nursing note and vitals reviewed. ASSESSMENT and PLAN    ICD-10-CM ICD-9-CM    1. Otitis media follow-up, infection resolved Z09 V67.59     Z86.69 V12.40    issue resolved.

## 2017-05-05 ENCOUNTER — OFFICE VISIT (OUTPATIENT)
Dept: OBGYN CLINIC | Age: 32
End: 2017-05-05

## 2017-05-05 VITALS
SYSTOLIC BLOOD PRESSURE: 137 MMHG | RESPIRATION RATE: 18 BRPM | BODY MASS INDEX: 44.3 KG/M2 | WEIGHT: 250 LBS | HEIGHT: 63 IN | DIASTOLIC BLOOD PRESSURE: 92 MMHG | HEART RATE: 82 BPM

## 2017-05-05 DIAGNOSIS — Z30.09 FAMILY PLANNING INITIATION: ICD-10-CM

## 2017-05-05 DIAGNOSIS — Z01.419 ENCOUNTER FOR GYNECOLOGICAL EXAMINATION WITHOUT ABNORMAL FINDING: Primary | ICD-10-CM

## 2017-05-05 LAB
HCG URINE, QL. (POC): NEGATIVE
VALID INTERNAL CONTROL?: YES

## 2017-05-05 RX ORDER — ETONOGESTREL AND ETHINYL ESTRADIOL 11.7; 2.7 MG/1; MG/1
INSERT, EXTENDED RELEASE VAGINAL
Qty: 3 DEVICE | Refills: 12 | Status: SHIPPED | OUTPATIENT
Start: 2017-05-05 | End: 2018-05-23 | Stop reason: SDUPTHER

## 2017-05-05 NOTE — MR AVS SNAPSHOT
Visit Information Date & Time Provider Department Dept. Phone Encounter #  
 5/5/2017  9:30 AM Andreia Raymundo DO [facility] [phone] [id number] Upcoming Health Maintenance Date Due INFLUENZA AGE 9 TO ADULT 8/1/2017 PAP AKA CERVICAL CYTOLOGY 4/23/2018 Allergies as of 5/5/2017  Review Complete On: 5/5/2017 By: Andreia Raymundo DO Severity Noted Reaction Type Reactions Asa-acetaminophen-caff-buffers High 04/20/2017    Anaphylaxis Current Immunizations  Reviewed on 9/3/2015 Name Date Influenza Vaccine (Quad) PF 9/3/2015 11:43 AM  
  
 Not reviewed this visit You Were Diagnosed With   
  
 Codes Comments Family planning initiation    -  Primary ICD-10-CM: Z30.09 
ICD-9-CM: V25.09 Vitals BP Pulse Resp Height(growth percentile) Weight(growth percentile) LMP  
 (!) 137/92 82 18 5' 3\" (1.6 m) 250 lb (113.4 kg) 04/20/2017 (Approximate) BMI OB Status Smoking Status 44.29 kg/m2 Having regular periods Never Smoker BMI and BSA Data Body Mass Index Body Surface Area  
 44.29 kg/m 2 2.25 m 2 Preferred Pharmacy Pharmacy Name Phone [facility] [address] [phone] Your Updated Medication List  
  
   
This list is accurate as of: 5/5/17 10:18 AM.  Always use your most recent med list.  
  
  
  
  
 albuterol 90 mcg/actuation inhaler Commonly known as:  PROVENTIL HFA, VENTOLIN HFA, PROAIR HFA Take 2 Puffs by inhalation every six (6) hours as needed for Wheezing.  
  
 ethinyl estradiol-etonogestrel 0.12-0.015 mg/24 hr vaginal ring Commonly known as:  Karyna Coleman Insert one device on vagina every four weeks, remove after three weeks. fluticasone 50 mcg/actuation nasal spray Commonly known as:  Skipper Or SHAKE LIQUID AND USE 2 SPRAYS IN EACH NOSTRIL EVERY DAY  
  
 ibuprofen 800 mg tablet Commonly known as:  MOTRIN Take 1 Tab by mouth every eight (8) hours as needed for Pain for up to 15 days. PARoxetine 20 mg tablet Commonly known as:  PAXIL TAKE 1 TABLET BY MOUTH EVERY DAY Prescriptions Sent to Pharmacy Refills  
 ethinyl estradiol-etonogestrel (NUVARING) 0.12-0.015 mg/24 hr vaginal ring 12 Sig: Insert one device on vagina every four weeks, remove after three weeks. Class: Normal  
 Pharmacy: [facility] [address] #: [phone] We Performed the Following AMB POC URINE PREGNANCY TEST, VISUAL COLOR COMPARISON [35428 CPT(R)] Introducing [facility] & [facility] SERVICES! Dear Jace Kurtz: Thank you for requesting a Cloud[employer] account. Our records indicate that you already have an active Cloud[employer] account. You can access your account anytime at [email or web address] Did you know that you can access your hospital and ER discharge instructions at any time in CloudVelocity? You can also review all of your test results from your hospital stay or ER visit. Additional Information If you have questions, please visit the Frequently Asked Questions section of the Cloud[employer] website at [email or web address] Remember, Cloud[employer]ocity is NOT to be used for urgent needs. For medical emergencies, dial 911. Now available from your iPhone and Android! Please provide this summary of care documentation to your next provider. Your primary care clinician is listed as [address] [place] [address]. If you have any questions after today's visit, please call [phone].

## 2017-05-05 NOTE — PROGRESS NOTES
Subjective:   29 y.o. female for Well Woman Check. Patient's last menstrual period was 04/20/2017 (approximate). Social History: single partner, contraception - NuvaRing vaginal inserts. Pertinent past medical hstory: hypertension, no history of DVT, CAD, DM, liver disease, migraines or smoking. Current Outpatient Prescriptions   Medication Sig Dispense Refill    ethinyl estradiol-etonogestrel (NUVARING) 0.12-0.015 mg/24 hr vaginal ring Insert one device on vagina every four weeks, remove after three weeks. 3 Device 12    ibuprofen (MOTRIN) 800 mg tablet Take 1 Tab by mouth every eight (8) hours as needed for Pain for up to 15 days. 45 Tab 0    fluticasone (FLONASE) 50 mcg/actuation nasal spray SHAKE LIQUID AND USE 2 SPRAYS IN EACH NOSTRIL EVERY DAY 1 Bottle 2    albuterol (PROVENTIL HFA, VENTOLIN HFA, PROAIR HFA) 90 mcg/actuation inhaler Take 2 Puffs by inhalation every six (6) hours as needed for Wheezing. 1 Inhaler 0    PARoxetine (PAXIL) 20 mg tablet TAKE 1 TABLET BY MOUTH EVERY DAY 90 Tab 3     Allergies   Allergen Reactions    Asa-Acetaminophen-Caff-Buffers Anaphylaxis     Past Medical History:   Diagnosis Date    Deviated septum 2012    Multiple nasal polyps 2012    removed      Past Surgical History:   Procedure Laterality Date    HX TONSILLECTOMY  2008    HX WISDOM TEETH EXTRACTION  2010     Family History   Problem Relation Age of Onset    Diabetes Mother      Social History   Substance Use Topics    Smoking status: Never Smoker    Smokeless tobacco: Never Used    Alcohol use No        ROS:  Feeling well. No dyspnea or chest pain on exertion. No abdominal pain, change in bowel habits, black or bloody stools. No urinary tract symptoms. GYN ROS: normal menses, no abnormal bleeding, pelvic pain or discharge, no breast pain or new or enlarging lumps on self exam. No neurological complaints.     Objective:     Visit Vitals    BP (!) 137/92    Pulse 82    Resp 18    Ht 5' 3\" (1.6 m)  Wt 250 lb (113.4 kg)    LMP 04/20/2017 (Approximate)    BMI 44.29 kg/m2     The patient appears well, alert, oriented x 3, in no distress. ENT normal.  Neck supple. No adenopathy or thyromegaly. TETE. Lungs are clear, good air entry, no wheezes, rhonchi or rales. S1 and S2 normal, no murmurs, regular rate and rhythm. Abdomen soft without tenderness, guarding, mass or organomegaly. Extremities show no edema, normal peripheral pulses. Neurological is normal, no focal findings.     BREAST EXAM: breasts appear normal, no suspicious masses, no skin or nipple changes or axillary nodes    PELVIC EXAM: normal external genitalia, vulva, vagina, cervix, uterus and adnexa, exam limited by obesity    Assessment/Plan:   well woman  Refill Nuva-Ring  pap smear not indicated, normal 2 years ago  return annually or prn

## 2017-05-09 RX ORDER — FLUTICASONE PROPIONATE 50 MCG
SPRAY, SUSPENSION (ML) NASAL
Qty: 1 BOTTLE | Refills: 0 | Status: SHIPPED | OUTPATIENT
Start: 2017-05-09 | End: 2017-06-08 | Stop reason: SDUPTHER

## 2017-05-21 RX ORDER — PAROXETINE HYDROCHLORIDE 20 MG/1
TABLET, FILM COATED ORAL
Qty: 90 TAB | Refills: 0 | Status: SHIPPED | OUTPATIENT
Start: 2017-05-21 | End: 2017-08-11 | Stop reason: SDUPTHER

## 2017-06-08 RX ORDER — FLUTICASONE PROPIONATE 50 MCG
SPRAY, SUSPENSION (ML) NASAL
Qty: 1 BOTTLE | Refills: 0 | Status: SHIPPED | OUTPATIENT
Start: 2017-06-08 | End: 2017-07-05 | Stop reason: SDUPTHER

## 2017-07-06 RX ORDER — FLUTICASONE PROPIONATE 50 MCG
SPRAY, SUSPENSION (ML) NASAL
Qty: 1 BOTTLE | Refills: 1 | Status: SHIPPED | OUTPATIENT
Start: 2017-07-06 | End: 2017-08-29 | Stop reason: SDUPTHER

## 2017-08-29 ENCOUNTER — OFFICE VISIT (OUTPATIENT)
Dept: FAMILY MEDICINE CLINIC | Facility: CLINIC | Age: 32
End: 2017-08-29

## 2017-08-29 VITALS
TEMPERATURE: 98.2 F | DIASTOLIC BLOOD PRESSURE: 72 MMHG | WEIGHT: 244 LBS | RESPIRATION RATE: 16 BRPM | SYSTOLIC BLOOD PRESSURE: 120 MMHG | HEART RATE: 78 BPM | OXYGEN SATURATION: 98 % | HEIGHT: 63 IN | BODY MASS INDEX: 43.23 KG/M2

## 2017-08-29 DIAGNOSIS — J30.9 CHRONIC ALLERGIC RHINITIS: ICD-10-CM

## 2017-08-29 DIAGNOSIS — F32.A DEPRESSION, UNSPECIFIED DEPRESSION TYPE: Primary | ICD-10-CM

## 2017-08-29 DIAGNOSIS — R73.03 PREDIABETES: ICD-10-CM

## 2017-08-29 RX ORDER — FLUTICASONE PROPIONATE 50 MCG
SPRAY, SUSPENSION (ML) NASAL
Qty: 1 BOTTLE | Refills: 2 | Status: SHIPPED | OUTPATIENT
Start: 2017-08-29 | End: 2017-12-26 | Stop reason: SDUPTHER

## 2017-08-29 RX ORDER — PAROXETINE HYDROCHLORIDE 20 MG/1
TABLET, FILM COATED ORAL
Qty: 30 TAB | Refills: 5 | Status: SHIPPED | OUTPATIENT
Start: 2017-08-29 | End: 2018-02-11 | Stop reason: SDUPTHER

## 2017-08-29 NOTE — PROGRESS NOTES
Alex Martinez is a 29 y.o.  female presents today for office visit for routine nonfasting follow up. Pt is in Room # 5.      1. Have you been to the ER, urgent care clinic since your last visit? Hospitalized since your last visit? No    2. Have you seen or consulted any other health care providers outside of the [address] [place] [address] since your last visit? Include any pap smears or colon screening. No    Health Maintenance reviewed. Pt declines influenza vaccine at this time.       Upcoming Appts  N/A

## 2017-08-29 NOTE — PROGRESS NOTES
SUBJECTIVE:  Bebe Mcelroy is a 29y.o. year old female   Chief Complaint   Patient presents with    Depression    Allergic Rhinitis       History of Present Illness:   She is here for follow up. No depression. No side effects of Paxil. Sleeping better and mood is better. She is not ready to wean off. She is following diet for elevated BS and lipids. Her headache is controlled. Her rhinitis is not bothering her since on Flonase. No more cough or wheeze. Past Medical History:   Diagnosis Date    Deviated septum 2012    Multiple nasal polyps 2012    removed      Past Surgical History:   Procedure Laterality Date    HX TONSILLECTOMY  2008    HX WISDOM TEETH EXTRACTION  2010        Current Outpatient Prescriptions   Medication Sig    PARoxetine (PAXIL) 20 mg tablet TAKE 1 TABLET BY MOUTH EVERY DAY    fluticasone (FLONASE) 50 mcg/actuation nasal spray SHAKE LIQUID AND USE 2 SPRAYS IN EACH NOSTRIL EVERY DAY    ethinyl estradiol-etonogestrel (NUVARING) 0.12-0.015 mg/24 hr vaginal ring Insert one device on vagina every four weeks, remove after three weeks.  albuterol (PROVENTIL HFA, VENTOLIN HFA, PROAIR HFA) 90 mcg/actuation inhaler Take 2 Puffs by inhalation every six (6) hours as needed for Wheezing. No current facility-administered medications for this visit. Allergies   Allergen Reactions    Asa-Acetaminophen-Caff-Buffers Anaphylaxis        Family History   Problem Relation Age of Onset    Diabetes Mother         Social History   Substance Use Topics    Smoking status: Never Smoker    Smokeless tobacco: Never Used    Alcohol use No       Review of Systems:   Constitutional: No fever, chills, night sweats, malaise, dizziness. Ear/Nose/Throat: No ear/ throat/ sinus pain, lesions, unusual discharge, new speaking or hearing problems, nose bleed  Cardiovascular: No angina, palpitations, PND, orthopnea, lightheadedness, edema, claudication.     Respiratory: No dyspnea, wheeze, pleurisy, hemoptysis, unusual cough or sputum. Gastrointestinal: No nausea/ vomiting, bowel habit change, pain, ANA symptoms, melena, hematochezia, anorexia. Neurological: No seizures, numbness, dizziness, speech abnormality, incontinence. Psychiatric: No agitation, confusion/disorientation, suicidal or homicidal ideation. Endocrine: Denies any other complaints. Musculoskeletal: no joint swelling, instability, focal weakness, stiffness/rigidity, radicular pain. OBJECTIVE:  Physical Exam:   Constitutional: General Appearance:  well developed,obese, nontoxic, in no acute distress. Visit Vitals    /72 (BP 1 Location: Left arm, BP Patient Position: Sitting)    Pulse 78    Temp 98.2 °F (36.8 °C) (Oral)    Resp 16    Ht 5' 3\" (1.6 m)    Wt 244 lb (110.7 kg)    SpO2 98%    BMI 43.22 kg/m2     Eyes[de-identified] Pupils & Irises: normal size; equal, round and reactive to light. EOMI.    ENT: Ear/Nose/Throat: No ear/ throat/ sinus pain, lesions, unusual discharge, new speaking or hearing problems, nose bleed    Pulmonary: Respiratory effort: normal; no dyspnea, no retractions, no accessory muscle use. Auscultation: normal & symmetrical air exchange; no rales, no rhonchi, no wheeze; no rubs    Cardiovascular: Palpation: PMI not displaced or enlarged, no thrills or heaves. Auscultation: RRR; no murmur, rubs or gallops. Extremities: no edema, no active varicosity. Gastrointestinal: Normal bowel sounds. No masses; no tenderness; no rebound/rigidity; no CVA tenderness. No hepatosplenomegaly. Psychiatric: Oriented to time, place and person. Normal mood, no agitation or anxiety. Improving affect. Pleasant and cooperative. Neurologic: CN I to XII: intact. DTR: symmetrical & normal.  SLR- neg    Musculoskeletal: NC/AT. Neck supple.     Lab Results   Component Value Date/Time    TSH 2.630 08/15/2016 08:35 AM     Lab Results   Component Value Date/Time    WBC 6.2 03/17/2017 10:38 AM HGB 11.9 03/17/2017 10:38 AM    HCT 36.3 03/17/2017 10:38 AM    PLATELET 174 88/99/0182 10:38 AM    MCV 85 03/17/2017 10:38 AM     Lab Results   Component Value Date/Time    Sodium 138 03/17/2017 10:38 AM    Potassium 4.5 03/17/2017 10:38 AM    Chloride 101 03/17/2017 10:38 AM    CO2 24 03/17/2017 10:38 AM    Glucose 96 03/17/2017 10:38 AM    BUN 8 03/17/2017 10:38 AM    Creatinine 0.67 03/17/2017 10:38 AM    BUN/Creatinine ratio 12 03/17/2017 10:38 AM    GFR est  03/17/2017 10:38 AM    GFR est non- 03/17/2017 10:38 AM    Calcium 9.1 03/17/2017 10:38 AM    Bilirubin, total <0.2 03/17/2017 10:38 AM    AST (SGOT) 11 03/17/2017 10:38 AM    Alk. phosphatase 56 03/17/2017 10:38 AM    Protein, total 6.7 03/17/2017 10:38 AM    Albumin 4.1 03/17/2017 10:38 AM    A-G Ratio 1.6 03/17/2017 10:38 AM    ALT (SGPT) 13 03/17/2017 10:38 AM     Lab Results   Component Value Date/Time    Cholesterol, total 140 03/17/2017 10:38 AM    HDL Cholesterol 56 03/17/2017 10:38 AM    LDL, calculated 64 03/17/2017 10:38 AM    VLDL, calculated 20 03/17/2017 10:38 AM    Triglyceride 99 03/17/2017 10:38 AM     Lab Results   Component Value Date/Time    Hemoglobin A1c 6.4 03/17/2017 10:38 AM       ASSESSMENT:     1. Depression, unspecified depression type    2. Chronic allergic rhinitis    3. Prediabetes        PLAN:     Orders Placed This Encounter    AMB POC HEMOGLOBIN A1C    PARoxetine (PAXIL) 20 mg tablet    fluticasone (FLONASE) 50 mcg/actuation nasal spray     Pharmacologic Management: Medications reviewed with the patient. No change    Discussed DDx, follow-up & work-up. Discussed risk/benefit & side effect of treatment. Follow up visit as planned, prn sooner. Low ADA salt diet, weightloss & graduated excecise. Health risk from non adherence discussed. Patient/ mother voiced understanding. Follow-up Disposition:  Return in about 6 months (around 2/28/2018) for fasting.     Nick Ballard MD

## 2017-10-02 ENCOUNTER — CLINICAL SUPPORT (OUTPATIENT)
Dept: FAMILY MEDICINE CLINIC | Age: 32
End: 2017-10-02

## 2017-10-02 VITALS
TEMPERATURE: 97 F | HEIGHT: 63 IN | WEIGHT: 244 LBS | DIASTOLIC BLOOD PRESSURE: 78 MMHG | OXYGEN SATURATION: 98 % | HEART RATE: 80 BPM | SYSTOLIC BLOOD PRESSURE: 110 MMHG | RESPIRATION RATE: 16 BRPM | BODY MASS INDEX: 43.23 KG/M2

## 2017-10-02 DIAGNOSIS — Z11.1 SCREENING EXAMINATION FOR PULMONARY TUBERCULOSIS: ICD-10-CM

## 2017-10-02 DIAGNOSIS — Z23 ENCOUNTER FOR IMMUNIZATION: ICD-10-CM

## 2017-10-02 DIAGNOSIS — Z11.1 VISIT FOR TB SKIN TEST: Primary | ICD-10-CM

## 2017-10-02 NOTE — PROGRESS NOTES
Janay Jarvis is a 29 y.o. female who presents for routine immunizations. She denies any symptoms , reactions or allergies that would exclude them from being immunized today. Risks and adverse reactions were discussed and the VIS was given to them. All questions were addressed. She was observed for 5 min post injection. There were no reactions observed.     Pt understands to come back Wednesday after 12pm or Thursday before Vassie Labor, LPN

## 2017-10-04 ENCOUNTER — CLINICAL SUPPORT (OUTPATIENT)
Dept: FAMILY MEDICINE CLINIC | Age: 32
End: 2017-10-04

## 2017-10-04 VITALS
HEIGHT: 63 IN | SYSTOLIC BLOOD PRESSURE: 120 MMHG | BODY MASS INDEX: 43.23 KG/M2 | WEIGHT: 244 LBS | OXYGEN SATURATION: 98 % | RESPIRATION RATE: 16 BRPM | DIASTOLIC BLOOD PRESSURE: 82 MMHG | TEMPERATURE: 98 F | HEART RATE: 78 BPM

## 2017-10-04 DIAGNOSIS — Z11.1 ENCOUNTER FOR PPD SKIN TEST READING: Primary | ICD-10-CM

## 2017-10-04 LAB
MM INDURATION POC: 0 MM (ref 0–5)
PPD POC: NEGATIVE NEGATIVE

## 2017-12-19 ENCOUNTER — OFFICE VISIT (OUTPATIENT)
Dept: FAMILY MEDICINE CLINIC | Facility: CLINIC | Age: 32
End: 2017-12-19

## 2017-12-19 VITALS
BODY MASS INDEX: 44.12 KG/M2 | SYSTOLIC BLOOD PRESSURE: 130 MMHG | WEIGHT: 249 LBS | TEMPERATURE: 98.3 F | HEART RATE: 82 BPM | RESPIRATION RATE: 15 BRPM | DIASTOLIC BLOOD PRESSURE: 82 MMHG | OXYGEN SATURATION: 98 % | HEIGHT: 63 IN

## 2017-12-19 DIAGNOSIS — R73.09 ELEVATED GLUCOSE: ICD-10-CM

## 2017-12-19 DIAGNOSIS — E66.01 OBESITY, MORBID (HCC): ICD-10-CM

## 2017-12-19 DIAGNOSIS — F32.A DEPRESSION, UNSPECIFIED DEPRESSION TYPE: Primary | ICD-10-CM

## 2017-12-19 DIAGNOSIS — J30.89 CHRONIC NON-SEASONAL ALLERGIC RHINITIS, UNSPECIFIED TRIGGER: ICD-10-CM

## 2017-12-19 PROBLEM — H66.001 ACUTE SUPPURATIVE OTITIS MEDIA OF RIGHT EAR WITHOUT SPONTANEOUS RUPTURE OF TYMPANIC MEMBRANE: Status: RESOLVED | Noted: 2017-04-20 | Resolved: 2017-12-19

## 2017-12-19 PROBLEM — Z09 OTITIS MEDIA FOLLOW-UP, INFECTION RESOLVED: Status: RESOLVED | Noted: 2017-04-29 | Resolved: 2017-12-19

## 2017-12-19 PROBLEM — Z86.69 OTITIS MEDIA FOLLOW-UP, INFECTION RESOLVED: Status: RESOLVED | Noted: 2017-04-29 | Resolved: 2017-12-19

## 2017-12-19 NOTE — PROGRESS NOTES
Janay Jarvis is a 29 y.o.  female presents today for office visit for routine follow up. Pt is in Room # 6.      1. Have you been to the ER, urgent care clinic since your last visit? Hospitalized since your last visit? No    2. Have you seen or consulted any other health care providers outside of the [address] [place] [address] since your last visit? Include any pap smears or colon screening. No    Health Maintenance UTD.       Upcoming Appts  N/A

## 2017-12-19 NOTE — PROGRESS NOTES
SUBJECTIVE:  Jocelyn Marino is a 29y.o. year old female   Chief Complaint   Patient presents with    Depression    Allergic Rhinitis       History of Present Illness:   She is here for follow up. No depression. No side effects of Paxil. Sleeping better and mood is better. She is not ready to wean off. Her headache is controlled since depression is treated. She is following diet and exercise for obesity, elevated BS and lipids. Her rhinitis is not bothering her since on Flonase; now PRN. No more cough or wheeze. Rarely needing albuterol. Past Medical History:   Diagnosis Date    Deviated septum 2012    Multiple nasal polyps 2012    removed      Past Surgical History:   Procedure Laterality Date    HX TONSILLECTOMY  2008    HX WISDOM TEETH EXTRACTION  2010        Current Outpatient Prescriptions   Medication Sig    PARoxetine (PAXIL) 20 mg tablet TAKE 1 TABLET BY MOUTH EVERY DAY    fluticasone (FLONASE) 50 mcg/actuation nasal spray SHAKE LIQUID AND USE 2 SPRAYS IN EACH NOSTRIL EVERY DAY    ethinyl estradiol-etonogestrel (NUVARING) 0.12-0.015 mg/24 hr vaginal ring Insert one device on vagina every four weeks, remove after three weeks.  albuterol (PROVENTIL HFA, VENTOLIN HFA, PROAIR HFA) 90 mcg/actuation inhaler Take 2 Puffs by inhalation every six (6) hours as needed for Wheezing. No current facility-administered medications for this visit. Allergies   Allergen Reactions    Asa-Acetaminophen-Caff-Buffers Anaphylaxis        Family History   Problem Relation Age of Onset    Diabetes Mother         Social History   Substance Use Topics    Smoking status: Never Smoker    Smokeless tobacco: Never Used    Alcohol use No       Review of Systems:   Constitutional: No fever, chills, night sweats, malaise, dizziness.    Ear/Nose/Throat: No ear/ throat/ sinus pain, lesions, unusual discharge, new speaking or hearing problems, nose bleed  Cardiovascular: No angina, palpitations, PND, orthopnea, lightheadedness, edema, claudication. Respiratory: No dyspnea, wheeze, pleurisy, hemoptysis, unusual cough or sputum. Gastrointestinal: No nausea/ vomiting, bowel habit change, pain, ANA symptoms, melena, hematochezia, anorexia. Neurological: No seizures, numbness, dizziness, speech abnormality, incontinence. Psychiatric: No agitation, confusion/disorientation, suicidal or homicidal ideation. Endocrine: Denies any other complaints. Musculoskeletal: no joint swelling, instability, focal weakness, stiffness/rigidity, radicular pain. OBJECTIVE:  Physical Exam:   Constitutional: General Appearance:  well developed,obese, nontoxic, in no acute distress. Visit Vitals    /82 (BP 1 Location: Left arm, BP Patient Position: Sitting)    Pulse 82    Temp 98.3 °F (36.8 °C) (Oral)    Resp 15    Ht 5' 2.9\" (1.598 m)    Wt 249 lb (112.9 kg)    SpO2 98%    BMI 44.25 kg/m2     Eyes[de-identified] Pupils & Irises: normal size; equal, round and reactive to light. EOMI.    ENT: Ear/Nose/Throat: No ear/ throat/ sinus pain, lesions, unusual discharge, new speaking or hearing problems, nose bleed    Pulmonary: Respiratory effort: normal; no dyspnea, no retractions, no accessory muscle use. Auscultation: normal & symmetrical air exchange; no rales, no rhonchi, no wheeze; no rubs    Cardiovascular: Palpation: PMI not displaced or enlarged, no thrills or heaves. Auscultation: RRR; no murmur, rubs or gallops. Extremities: no edema, no active varicosity. Gastrointestinal: Normal bowel sounds. No masses; no tenderness; no rebound/rigidity; no CVA tenderness. No hepatosplenomegaly. Psychiatric: Oriented to time, place and person. Normal mood, no agitation or anxiety. Improving affect. Pleasant and cooperative. Neurologic: CN I to XII: intact. DTR: symmetrical & normal.  SLR- neg    Musculoskeletal: NC/AT. Neck supple.     Lab Results   Component Value Date/Time    TSH 2.630 08/15/2016 08:35 AM     Lab Results   Component Value Date/Time    WBC 6.2 03/17/2017 10:38 AM    HGB 11.9 03/17/2017 10:38 AM    HCT 36.3 03/17/2017 10:38 AM    PLATELET 490 63/74/0214 10:38 AM    MCV 85 03/17/2017 10:38 AM     Lab Results   Component Value Date/Time    Sodium 138 03/17/2017 10:38 AM    Potassium 4.5 03/17/2017 10:38 AM    Chloride 101 03/17/2017 10:38 AM    CO2 24 03/17/2017 10:38 AM    Glucose 96 03/17/2017 10:38 AM    BUN 8 03/17/2017 10:38 AM    Creatinine 0.67 03/17/2017 10:38 AM    BUN/Creatinine ratio 12 03/17/2017 10:38 AM    GFR est  03/17/2017 10:38 AM    GFR est non- 03/17/2017 10:38 AM    Calcium 9.1 03/17/2017 10:38 AM    Bilirubin, total <0.2 03/17/2017 10:38 AM    AST (SGOT) 11 03/17/2017 10:38 AM    Alk. phosphatase 56 03/17/2017 10:38 AM    Protein, total 6.7 03/17/2017 10:38 AM    Albumin 4.1 03/17/2017 10:38 AM    A-G Ratio 1.6 03/17/2017 10:38 AM    ALT (SGPT) 13 03/17/2017 10:38 AM     Lab Results   Component Value Date/Time    Cholesterol, total 140 03/17/2017 10:38 AM    HDL Cholesterol 56 03/17/2017 10:38 AM    LDL, calculated 64 03/17/2017 10:38 AM    VLDL, calculated 20 03/17/2017 10:38 AM    Triglyceride 99 03/17/2017 10:38 AM     Lab Results   Component Value Date/Time    Hemoglobin A1c 6.4 03/17/2017 10:38 AM   HgbA1c today 5.8    ASSESSMENT:     1. Depression, unspecified depression type    2. Obesity, morbid (HCC)    3. Elevated glucose    4. Chronic non-seasonal allergic rhinitis, unspecified trigger        PLAN:     Orders Placed This Encounter    AMB POC HEMOGLOBIN A1C     Pharmacologic Management: Medications reviewed with the patient. No change    Discussed DDx, follow-up & work-up. Discussed risk/benefit & side effect of treatment. Follow up visit as planned, prn sooner. Low ADA salt diet, weightloss & graduated excecise. Health risk from non adherence discussed. Patient/ mother voiced understanding.      Follow-up Disposition:  Return in about 3 months (around 3/19/2018).     Marcelino Bernard MD

## 2018-04-03 ENCOUNTER — OFFICE VISIT (OUTPATIENT)
Dept: FAMILY MEDICINE CLINIC | Age: 33
End: 2018-04-03

## 2018-04-03 VITALS
OXYGEN SATURATION: 99 % | WEIGHT: 248.8 LBS | DIASTOLIC BLOOD PRESSURE: 72 MMHG | BODY MASS INDEX: 44.08 KG/M2 | SYSTOLIC BLOOD PRESSURE: 112 MMHG | RESPIRATION RATE: 19 BRPM | HEART RATE: 86 BPM | HEIGHT: 63 IN | TEMPERATURE: 98.8 F

## 2018-04-03 DIAGNOSIS — J01.00 ACUTE MAXILLARY SINUSITIS, RECURRENCE NOT SPECIFIED: ICD-10-CM

## 2018-04-03 DIAGNOSIS — F33.9 RECURRENT DEPRESSION (HCC): Primary | ICD-10-CM

## 2018-04-03 DIAGNOSIS — E66.01 OBESITY, MORBID (HCC): ICD-10-CM

## 2018-04-03 DIAGNOSIS — J30.9 CHRONIC ALLERGIC RHINITIS: ICD-10-CM

## 2018-04-03 RX ORDER — AMOXICILLIN 500 MG/1
1000 CAPSULE ORAL 2 TIMES DAILY
Qty: 40 CAP | Refills: 0 | Status: SHIPPED | OUTPATIENT
Start: 2018-04-03 | End: 2018-04-13

## 2018-04-03 RX ORDER — FLUTICASONE PROPIONATE 50 MCG
2 SPRAY, SUSPENSION (ML) NASAL DAILY
Qty: 1 BOTTLE | Refills: 6 | Status: SHIPPED | OUTPATIENT
Start: 2018-04-03 | End: 2018-09-23 | Stop reason: SDUPTHER

## 2018-04-03 NOTE — PATIENT INSTRUCTIONS
Body Mass Index: Care Instructions  Your Care Instructions    Body mass index (BMI) can help you see if your weight is raising your risk for health problems. It uses a formula to compare how much you weigh with how tall you are. · A BMI lower than 18.5 is considered underweight. · A BMI between 18.5 and 24.9 is considered healthy. · A BMI between 25 and 29.9 is considered overweight. A BMI of 30 or higher is considered obese. If your BMI is in the normal range, it means that you have a lower risk for weight-related health problems. If your BMI is in the overweight or obese range, you may be at increased risk for weight-related health problems, such as high blood pressure, heart disease, stroke, arthritis or joint pain, and diabetes. If your BMI is in the underweight range, you may be at increased risk for health problems such as fatigue, lower protection (immunity) against illness, muscle loss, bone loss, hair loss, and hormone problems. BMI is just one measure of your risk for weight-related health problems. You may be at higher risk for health problems if you are not active, you eat an unhealthy diet, or you drink too much alcohol or use tobacco products. Follow-up care is a key part of your treatment and safety. Be sure to make and go to all appointments, and call your doctor if you are having problems. It's also a good idea to know your test results and keep a list of the medicines you take. How can you care for yourself at home? · Practice healthy eating habits. This includes eating plenty of fruits, vegetables, whole grains, lean protein, and low-fat dairy. · If your doctor recommends it, get more exercise. Walking is a good choice. Bit by bit, increase the amount you walk every day. Try for at least 30 minutes on most days of the week. · Do not smoke. Smoking can increase your risk for health problems. If you need help quitting, talk to your doctor about stop-smoking programs and medicines. These can increase your chances of quitting for good. · Limit alcohol to 2 drinks a day for men and 1 drink a day for women. Too much alcohol can cause health problems. If you have a BMI higher than 25  · Your doctor may do other tests to check your risk for weight-related health problems. This may include measuring the distance around your waist. A waist measurement of more than 40 inches in men or 35 inches in women can increase the risk of weight-related health problems. · Talk with your doctor about steps you can take to stay healthy or improve your health. You may need to make lifestyle changes to lose weight and stay healthy, such as changing your diet and getting regular exercise. If you have a BMI lower than 18.5  · Your doctor may do other tests to check your risk for health problems. · Talk with your doctor about steps you can take to stay healthy or improve your health. You may need to make lifestyle changes to gain or maintain weight and stay healthy, such as getting more healthy foods in your diet and doing exercises to build muscle. Where can you learn more? Go to http://adriel-nora.info/. Enter S176 in the search box to learn more about \"Body Mass Index: Care Instructions. \"  Current as of: October 13, 2016  Content Version: 11.4  © 0992-1963 [employer]wise[employer]. Care instructions adapted under license by [employer] (which disclaims liability or warranty for this information). If you have questions about a medical condition or this instruction, always ask your healthcare professional. [place] [address] any warranty or liability for your use of this information. Starting a Weight Loss Plan: Care Instructions  Your Care Instructions    If you are thinking about losing weight, it can be hard to know where to start. Your doctor can help you set up a weight loss plan that best meets your needs.  You may want to take a class on nutrition or exercise, or join a weight loss support group. If you have questions about how to make changes to your eating or exercise habits, ask your doctor about seeing a registered dietitian or an exercise specialist.  It can be a big challenge to lose weight. But you do not have to make huge changes at once. Make small changes, and stick with them. When those changes become habit, add a few more changes. If you do not think you are ready to make changes right now, try to pick a date in the future. Make an appointment to see your doctor to discuss whether the time is right for you to start a plan. Follow-up care is a key part of your treatment and safety. Be sure to make and go to all appointments, and call your doctor if you are having problems. It's also a good idea to know your test results and keep a list of the medicines you take. How can you care for yourself at home? · Set realistic goals. Many people expect to lose much more weight than is likely. A weight loss of 5% to 10% of your body weight may be enough to improve your health. · Get family and friends involved to provide support. Talk to them about why you are trying to lose weight, and ask them to help. They can help by participating in exercise and having meals with you, even if they may be eating something different. · Find what works best for you. If you do not have time or do not like to cook, a program that offers meal replacement bars or shakes may be better for you. Or if you like to prepare meals, finding a plan that includes daily menus and recipes may be best.  · Ask your doctor about other health professionals who can help you achieve your weight loss goals. ¨ A dietitian can help you make healthy changes in your diet.   ¨ An exercise specialist or  can help you develop a safe and effective exercise program.  ¨ A counselor or psychiatrist can help you cope with issues such as depression, anxiety, or family problems that can make it hard to focus on weight loss. · Consider joining a support group for people who are trying to lose weight. Your doctor can suggest groups in your area. Where can you learn more? Go to http://adriel-nora.info/. Enter O898 in the search box to learn more about \"Starting a Weight Loss Plan: Care Instructions. \"  Current as of: October 13, 2016  Content Version: 11.4  © 1151-9630 [employer]. Care instructions adapted under license by [employer] (which disclaims liability or warranty for this information). If you have questions about a medical condition or this instruction, always ask your healthcare professional. [address] any warranty or liability for your use of this information.

## 2018-04-03 NOTE — PROGRESS NOTES
SUBJECTIVE:  Ugo Mcclain is a 29y.o. year old female   Chief Complaint   Patient presents with    Depression    High Blood Sugar    Sinus Pain       History of Present Illness:   She is here for follow up. She has discomfort on right side of face with purulent drainage for the last few days. Initially she had a low grade fever; but none in the last 2 days. She has stopped using Flonase for the last several weeks. She denies any significant cough or wheezing; she has been rarely needing any albuterol    No depression. No side effects of Paxil. Sleeping better and mood is better. She is not ready to wean off. Her headache is controlled since depression is treated. She is following diet and exercise for obesity, elevated BS and lipids. Past Medical History:   Diagnosis Date    Deviated septum 2012    Multiple nasal polyps 2012    removed      Past Surgical History:   Procedure Laterality Date    HX TONSILLECTOMY  2008    HX WISDOM TEETH EXTRACTION  2010        Current Outpatient Prescriptions   Medication Sig    PARoxetine (PAXIL) 20 mg tablet TAKE 1 TABLET BY MOUTH EVERY DAY    fluticasone (FLONASE) 50 mcg/actuation nasal spray SHAKE LIQUID AND USE 2 SPRAYS IN EACH NOSTRIL EVERY DAY    ethinyl estradiol-etonogestrel (NUVARING) 0.12-0.015 mg/24 hr vaginal ring Insert one device on vagina every four weeks, remove after three weeks.  albuterol (PROVENTIL HFA, VENTOLIN HFA, PROAIR HFA) 90 mcg/actuation inhaler Take 2 Puffs by inhalation every six (6) hours as needed for Wheezing. No current facility-administered medications for this visit.         Allergies   Allergen Reactions    Asa-Acetaminophen-Caff-Buffers Anaphylaxis     Aspirin portion        Family History   Problem Relation Age of Onset    Diabetes Mother         Social History   Substance Use Topics    Smoking status: Never Smoker    Smokeless tobacco: Never Used    Alcohol use No       Review of Systems: Constitutional: Now no fever, chills, night sweats, malaise, dizziness. Ear/Nose/Throat: No ear/ throat pain, lesions, new speaking or hearing problems, nose bleed  Cardiovascular: No angina, palpitations, PND, orthopnea, lightheadedness, edema, claudication. Respiratory: No dyspnea, wheeze, pleurisy, hemoptysis, unusual cough or sputum. Gastrointestinal: No nausea/ vomiting, bowel habit change, pain, ANA symptoms, melena, hematochezia, anorexia. Neurological: No seizures, numbness, dizziness, speech abnormality, incontinence. Psychiatric: No agitation, confusion/disorientation, suicidal or homicidal ideation. Endocrine: Denies any other complaints. Musculoskeletal: no joint swelling, instability, focal weakness, stiffness/rigidity, radicular pain. OBJECTIVE:  Physical Exam:   Constitutional: General Appearance:  well developed,obese, nontoxic, in no acute distress. Visit Vitals    /72 (BP 1 Location: Left arm, BP Patient Position: Sitting)    Pulse 86    Temp 98.8 °F (37.1 °C) (Oral)    Resp 19    Ht 5' 2.9\" (1.598 m)    Wt 248 lb 12.8 oz (112.9 kg)    SpO2 99%    BMI 44.21 kg/m2     Eyes[de-identified] Pupils & Irises: normal size; equal, round and reactive to light. EOMI. Otoscopic Examination: external auditory canals are clear; tympanic membranes are dull. Nasal Cavity: mildly swollen mucosa & turbinates. Maxillas are tender (rt>Lt) w/o redness or heat. Throat: clear tonsils, oropharynx, posterior pharynx. Pulmonary: Respiratory effort: normal; no dyspnea, no retractions, no accessory muscle use. Auscultation: normal & symmetrical air exchange; no rales, no rhonchi, no wheeze; no rubs    Cardiovascular: Palpation: PMI not displaced or enlarged, no thrills or heaves. Auscultation: RRR; no murmur, rubs or gallops. Extremities: no edema, no active varicosity. Gastrointestinal: Normal bowel sounds. No masses; no tenderness; no rebound/rigidity; no CVA tenderness.   No hepatosplenomegaly. Psychiatric: Oriented to time, place and person. Normal mood, no agitation or anxiety. Improving affect. Pleasant and cooperative. Neurologic: CN I to XII: intact. DTR: symmetrical & normal.  SLR- neg    Musculoskeletal: NC/AT. Neck supple. Lab Results   Component Value Date/Time    TSH 2.630 08/15/2016 08:35 AM     Lab Results   Component Value Date/Time    WBC 6.2 03/17/2017 10:38 AM    HGB 11.9 03/17/2017 10:38 AM    HCT 36.3 03/17/2017 10:38 AM    PLATELET 510 64/99/6407 10:38 AM    MCV 85 03/17/2017 10:38 AM     Lab Results   Component Value Date/Time    Sodium 138 03/17/2017 10:38 AM    Potassium 4.5 03/17/2017 10:38 AM    Chloride 101 03/17/2017 10:38 AM    CO2 24 03/17/2017 10:38 AM    Glucose 96 03/17/2017 10:38 AM    BUN 8 03/17/2017 10:38 AM    Creatinine 0.67 03/17/2017 10:38 AM    BUN/Creatinine ratio 12 03/17/2017 10:38 AM    GFR est  03/17/2017 10:38 AM    GFR est non- 03/17/2017 10:38 AM    Calcium 9.1 03/17/2017 10:38 AM    Bilirubin, total <0.2 03/17/2017 10:38 AM    AST (SGOT) 11 03/17/2017 10:38 AM    Alk. phosphatase 56 03/17/2017 10:38 AM    Protein, total 6.7 03/17/2017 10:38 AM    Albumin 4.1 03/17/2017 10:38 AM    A-G Ratio 1.6 03/17/2017 10:38 AM    ALT (SGPT) 13 03/17/2017 10:38 AM     Lab Results   Component Value Date/Time    Cholesterol, total 140 03/17/2017 10:38 AM    HDL Cholesterol 56 03/17/2017 10:38 AM    LDL, calculated 64 03/17/2017 10:38 AM    VLDL, calculated 20 03/17/2017 10:38 AM    Triglyceride 99 03/17/2017 10:38 AM     Lab Results   Component Value Date/Time    Hemoglobin A1c 6.4 (H) 03/17/2017 10:38 AM       ASSESSMENT:     1. Recurrent depression ([address]    2. Acute maxillary sinusitis, recurrence not specified    3. Obesity, morbid ([facility] [address]    4.  Chronic allergic rhinitis        PLAN:     Orders Placed This Encounter    CBC WITH AUTOMATED DIFF    METABOLIC PANEL, COMPREHENSIVE    LIPID PANEL    URINALYSIS W/ RFLX MICROSCOPIC    HEMOGLOBIN A1C WITH EAG    TSH 3RD GENERATION    T4, FREE    fluticasone (FLONASE) 50 mcg/actuation nasal spray    amoxicillin (AMOXIL) 500 mg capsule     Discussed the patient's BMI with her. The BMI follow up plan is as follows:   -dietary management education, guidance, and counseling  -encourage exercise  -monitor weight prescribed dietary intake  -printed instructions in AVS.      Pharmacologic Management: Medications reviewed with the patient. Flonase 2 every day. Amox 1 gm bid. Follow-up with the gynecologist for the Pap smear. Discussed DDx, follow-up & work-up. Discussed risk/benefit & side effect of treatment. Follow up visit as planned, prn sooner. Low ADA salt diet, weightloss & graduated excecise. Health risk from non adherence discussed. Patient/ mother voiced understanding. Follow-up Disposition:  Return in about 3 months (around 7/3/2018).     Liat Mesa MD

## 2018-04-03 NOTE — PROGRESS NOTES
Marco Stringer is a 29 y.o. female presents in office for 3 mos f/u and sinus pain. Health Maintenance Due   Topic Date Due    PAP AKA CERVICAL CYTOLOGY  04/23/2018   Dr. Kristan Rosa, [facility]    1. Have you been to the ER, urgent care clinic since your last visit? Hospitalized since your last visit?01/2018 [facility]     2. Have you seen or consulted any other health care providers outside of the [address] [place] [address] since your last visit? Include any pap smears or colon screening.  No

## 2018-04-04 ENCOUNTER — TELEPHONE (OUTPATIENT)
Dept: FAMILY MEDICINE CLINIC | Age: 33
End: 2018-04-04

## 2018-04-04 LAB
ALBUMIN SERPL-MCNC: 3.9 G/DL (ref 3.5–5.5)
ALBUMIN/GLOB SERPL: 1.4 {RATIO} (ref 1.2–2.2)
ALP SERPL-CCNC: 60 IU/L (ref 39–117)
ALT SERPL-CCNC: 9 IU/L (ref 0–32)
APPEARANCE UR: CLEAR
AST SERPL-CCNC: 10 IU/L (ref 0–40)
BASOPHILS # BLD AUTO: 0 X10E3/UL (ref 0–0.2)
BASOPHILS NFR BLD AUTO: 0 %
BILIRUB SERPL-MCNC: <0.2 MG/DL (ref 0–1.2)
BILIRUB UR QL STRIP: NEGATIVE
BUN SERPL-MCNC: 9 MG/DL (ref 6–20)
BUN/CREAT SERPL: 13 (ref 9–23)
CALCIUM SERPL-MCNC: 9 MG/DL (ref 8.7–10.2)
CHLORIDE SERPL-SCNC: 101 MMOL/L (ref 96–106)
CHOLEST SERPL-MCNC: 137 MG/DL (ref 100–199)
CO2 SERPL-SCNC: 21 MMOL/L (ref 18–29)
COLOR UR: YELLOW
CREAT SERPL-MCNC: 0.67 MG/DL (ref 0.57–1)
EOSINOPHIL # BLD AUTO: 0.1 X10E3/UL (ref 0–0.4)
EOSINOPHIL NFR BLD AUTO: 1 %
ERYTHROCYTE [DISTWIDTH] IN BLOOD BY AUTOMATED COUNT: 13.7 % (ref 12.3–15.4)
EST. AVERAGE GLUCOSE BLD GHB EST-MCNC: 126 MG/DL
GFR SERPLBLD CREATININE-BSD FMLA CKD-EPI: 118 ML/MIN/1.73
GFR SERPLBLD CREATININE-BSD FMLA CKD-EPI: 136 ML/MIN/1.73
GLOBULIN SER CALC-MCNC: 2.7 G/DL (ref 1.5–4.5)
GLUCOSE SERPL-MCNC: 90 MG/DL (ref 65–99)
GLUCOSE UR QL: NEGATIVE
HBA1C MFR BLD: 6 % (ref 4.8–5.6)
HCT VFR BLD AUTO: 37.2 % (ref 34–46.6)
HDLC SERPL-MCNC: 54 MG/DL
HGB BLD-MCNC: 11.9 G/DL (ref 11.1–15.9)
HGB UR QL STRIP: NEGATIVE
IMM GRANULOCYTES # BLD: 0 X10E3/UL (ref 0–0.1)
IMM GRANULOCYTES NFR BLD: 0 %
INTERPRETATION, 910389: NORMAL
KETONES UR QL STRIP: NEGATIVE
LDLC SERPL CALC-MCNC: 66 MG/DL (ref 0–99)
LEUKOCYTE ESTERASE UR QL STRIP: NEGATIVE
LYMPHOCYTES # BLD AUTO: 2.7 X10E3/UL (ref 0.7–3.1)
LYMPHOCYTES NFR BLD AUTO: 39 %
MCH RBC QN AUTO: 27.7 PG (ref 26.6–33)
MCHC RBC AUTO-ENTMCNC: 32 G/DL (ref 31.5–35.7)
MCV RBC AUTO: 87 FL (ref 79–97)
MICRO URNS: NORMAL
MONOCYTES # BLD AUTO: 0.4 X10E3/UL (ref 0.1–0.9)
MONOCYTES NFR BLD AUTO: 6 %
NEUTROPHILS # BLD AUTO: 3.7 X10E3/UL (ref 1.4–7)
NEUTROPHILS NFR BLD AUTO: 54 %
NITRITE UR QL STRIP: NEGATIVE
PH UR STRIP: 6 [PH] (ref 5–7.5)
PLATELET # BLD AUTO: 302 X10E3/UL (ref 150–379)
POTASSIUM SERPL-SCNC: 4.2 MMOL/L (ref 3.5–5.2)
PROT SERPL-MCNC: 6.6 G/DL (ref 6–8.5)
PROT UR QL STRIP: NEGATIVE
RBC # BLD AUTO: 4.29 X10E6/UL (ref 3.77–5.28)
SODIUM SERPL-SCNC: 137 MMOL/L (ref 134–144)
SP GR UR: 1.02 (ref 1–1.03)
T4 FREE SERPL-MCNC: 1.02 NG/DL (ref 0.82–1.77)
TRIGL SERPL-MCNC: 86 MG/DL (ref 0–149)
TSH SERPL DL<=0.005 MIU/L-ACNC: 3.31 UIU/ML (ref 0.45–4.5)
UROBILINOGEN UR STRIP-MCNC: 0.2 MG/DL (ref 0.2–1)
VLDLC SERPL CALC-MCNC: 17 MG/DL (ref 5–40)
WBC # BLD AUTO: 6.8 X10E3/UL (ref 3.4–10.8)

## 2018-04-04 NOTE — PROGRESS NOTES
Lab work came back good except hemoglobin A1c was still in prediabetic range. However it has improved. The patient is advised to continue with diet and exercise as tolerated.

## 2018-04-04 NOTE — TELEPHONE ENCOUNTER
----- Message from Fransico Phelan MD sent at 4/4/2018  1:49 PM EDT -----  Lab work came back good except hemoglobin A1c was still in prediabetic range. However it has improved. The patient is advised to continue with diet and exercise as tolerated.

## 2018-05-02 RX ORDER — PAROXETINE HYDROCHLORIDE 20 MG/1
TABLET, FILM COATED ORAL
Qty: 90 TAB | Refills: 0 | Status: SHIPPED | OUTPATIENT
Start: 2018-05-02 | End: 2018-07-30 | Stop reason: SDUPTHER

## 2018-06-20 RX ORDER — ETONOGESTREL AND ETHINYL ESTRADIOL 11.7; 2.7 MG/1; MG/1
INSERT, EXTENDED RELEASE VAGINAL
Qty: 1 DEVICE | Refills: 0 | Status: SHIPPED | OUTPATIENT
Start: 2018-06-20 | End: 2018-07-13 | Stop reason: SDUPTHER

## 2018-07-13 ENCOUNTER — OFFICE VISIT (OUTPATIENT)
Dept: OBGYN CLINIC | Age: 33
End: 2018-07-13

## 2018-07-13 VITALS
RESPIRATION RATE: 18 BRPM | SYSTOLIC BLOOD PRESSURE: 107 MMHG | HEIGHT: 62 IN | DIASTOLIC BLOOD PRESSURE: 74 MMHG | HEART RATE: 82 BPM | OXYGEN SATURATION: 98 % | BODY MASS INDEX: 45.64 KG/M2 | WEIGHT: 248 LBS

## 2018-07-13 DIAGNOSIS — Z01.419 ENCOUNTER FOR GYNECOLOGICAL EXAMINATION WITHOUT ABNORMAL FINDING: Primary | ICD-10-CM

## 2018-07-13 RX ORDER — ETONOGESTREL AND ETHINYL ESTRADIOL .12; .015 MG/D; MG/D
INSERT, EXTENDED RELEASE VAGINAL
Qty: 1 EACH | Refills: 1 | Status: SHIPPED | OUTPATIENT
Start: 2018-07-13 | End: 2018-09-11 | Stop reason: SDUPTHER

## 2018-07-13 NOTE — PROGRESS NOTES
Subjective:   34 y.o. female for Well Woman Check. Patient's last menstrual period was 06/20/2018. Social History: not sexually active. Pertinent past medical hstory: no history of HTN, DVT, CAD, DM, liver disease, migraines or smoking. Current Outpatient Prescriptions   Medication Sig Dispense Refill    ethinyl estradiol-etonogestrel (NUVARING) 0.12-0.015 mg/24 hr vaginal ring INSERT 1 DEVICE IN THE VAGINA EVERY 4 WEEKS REMOVE AFTER 3 WEEKS 1 Device 0    PARoxetine (PAXIL) 20 mg tablet TAKE 1 TABLET BY MOUTH EVERY DAY 90 Tab 0    fluticasone (FLONASE) 50 mcg/actuation nasal spray 2 Sprays by Both Nostrils route daily for 180 days. 1 Bottle 6    albuterol (PROVENTIL HFA, VENTOLIN HFA, PROAIR HFA) 90 mcg/actuation inhaler Take 2 Puffs by inhalation every six (6) hours as needed for Wheezing. 1 Inhaler 0     Allergies   Allergen Reactions    Asa-Acetaminophen-Caff-Buffers Anaphylaxis     Aspirin portion     Past Medical History:   Diagnosis Date    Deviated septum 2012    Multiple nasal polyps 2012    removed      Past Surgical History:   Procedure Laterality Date    HX TONSILLECTOMY  2008    HX WISDOM TEETH EXTRACTION  2010     Family History   Problem Relation Age of Onset    Diabetes Mother      Social History   Substance Use Topics    Smoking status: Never Smoker    Smokeless tobacco: Never Used    Alcohol use No        ROS:  Feeling well. No dyspnea or chest pain on exertion. No abdominal pain, change in bowel habits, black or bloody stools. No urinary tract symptoms. GYN ROS: normal menses, no abnormal bleeding, pelvic pain or discharge, no breast pain or new or enlarging lumps on self exam, she complains of cramping, nausea, and vomiting with menses. No neurological complaints.     Objective:     Visit Vitals    /74    Pulse 82    Resp 18    Ht 5' 2\" (1.575 m)    Wt 248 lb (112.5 kg)    LMP 06/20/2018    SpO2 98%    BMI 45.36 kg/m2     The patient appears well, alert, oriented x 3, in no distress. ENT normal.  Neck supple. No adenopathy or thyromegaly. TETE. Lungs are clear, good air entry, no wheezes, rhonchi or rales. S1 and S2 normal, no murmurs, regular rate and rhythm. Abdomen soft without tenderness, guarding, mass or organomegaly. Extremities show no edema, normal peripheral pulses. Neurological is normal, no focal findings. BREAST EXAM: breasts appear normal, no suspicious masses, no skin or nipple changes or axillary nodes    PELVIC EXAM: normal external genitalia, vulva, vagina, cervix, uterus and adnexa, PAP: Pap smear done today, HPV test    Assessment/Plan:   well woman  Contraceptive counseling. Patient would like a Mirena IUD, briefly reviewed the risks and benefits. pap smear  Follow up for IUD placement. Plan of care discussed. Patient expressed understanding.

## 2018-07-19 LAB
CYTOLOGIST CVX/VAG CYTO: ABNORMAL
CYTOLOGY CVX/VAG DOC THIN PREP: ABNORMAL
HPV GENOTYPE 18,45: NEGATIVE
HPV I/H RISK 4 DNA CVX QL PROBE+SIG AMP: POSITIVE
HPV16 DNA CVX QL PROBE+SIG AMP: NEGATIVE
Lab: ABNORMAL
Lab: ABNORMAL
OTHER STN SPEC: ABNORMAL
PATH REPORT.FINAL DX SPEC: ABNORMAL
STAT OF ADQ CVX/VAG CYTO-IMP: ABNORMAL

## 2018-07-26 ENCOUNTER — DOCUMENTATION ONLY (OUTPATIENT)
Dept: OBGYN CLINIC | Age: 33
End: 2018-07-26

## 2018-07-30 RX ORDER — PAROXETINE HYDROCHLORIDE 20 MG/1
TABLET, FILM COATED ORAL
Qty: 90 TAB | Refills: 0 | Status: SHIPPED | OUTPATIENT
Start: 2018-07-30 | End: 2018-10-25 | Stop reason: SDUPTHER

## 2018-08-16 ENCOUNTER — OFFICE VISIT (OUTPATIENT)
Dept: OBGYN CLINIC | Age: 33
End: 2018-08-16

## 2018-08-16 VITALS
WEIGHT: 253 LBS | HEART RATE: 89 BPM | SYSTOLIC BLOOD PRESSURE: 118 MMHG | RESPIRATION RATE: 18 BRPM | DIASTOLIC BLOOD PRESSURE: 83 MMHG | OXYGEN SATURATION: 100 % | HEIGHT: 62 IN | BODY MASS INDEX: 46.56 KG/M2

## 2018-08-16 DIAGNOSIS — Z30.430 ENCOUNTER FOR IUD INSERTION: Primary | ICD-10-CM

## 2018-08-16 LAB
HCG URINE, QL. (POC): NEGATIVE
VALID INTERNAL CONTROL?: YES

## 2018-08-16 NOTE — PROGRESS NOTES
Pt tolerated IUD mirena without any problem, UPT negative, [employer],  Lot number [id number] Poot date 11/20, [address] [facility] [id number].

## 2018-08-16 NOTE — PROCEDURES
[facility]  OFFICE PROCEDURE PROGRESS NOTE        Chart reviewed for the following:   Pollo QUISPE DO, have reviewed the History, Physical and updated the Allergic reactions for Devon L Omari     TIME OUT performed immediately prior to start of procedure:   Pollo QUISPE DO, have performed the following reviews on Devon L Omari prior to the start of the procedure:            * Patient was identified by name and date of birth   * Agreement on procedure being performed was verified  * Risks and Benefits explained to the patient  * Procedure site verified and marked as necessary  * Patient was positioned for comfort  * Consent was signed and verified     Time: 1400    Date of procedure: 8/16/2018    Procedure performed by:  Pollo Lowe DO    Provider assisted by: Liban Schreiber LPN    Patient assisted by: self    How tolerated by patient: tolerated the procedure well with no complications    Post Procedural Pain Scale: 0 - No Hurt    Comments: none    Procedure note, IUD insertion:  The patient was placed in a dorsal lithotomy position. A speculum was inserted in the vagina and the cervix swabbed with Betadine. A single-toothed tenaculum was placed on the anterior lip of the cervix and the uterus sounded to 9 cm. The device was inserted and deployed without difficulty. The strings were trimmed and all instruments removed from the vagina. There were no complications and the patient tolerated the procedure well.

## 2018-09-23 RX ORDER — FLUTICASONE PROPIONATE 50 MCG
SPRAY, SUSPENSION (ML) NASAL
Qty: 1 BOTTLE | Refills: 0 | Status: SHIPPED | OUTPATIENT
Start: 2018-09-23 | End: 2020-06-16

## 2020-06-16 ENCOUNTER — VIRTUAL VISIT (OUTPATIENT)
Dept: FAMILY MEDICINE CLINIC | Facility: CLINIC | Age: 35
End: 2020-06-16

## 2020-06-16 DIAGNOSIS — F33.9 RECURRENT DEPRESSION (HCC): Primary | ICD-10-CM

## 2020-06-16 DIAGNOSIS — E66.01 OBESITY, MORBID (HCC): ICD-10-CM

## 2020-06-16 DIAGNOSIS — R79.89 ELEVATED TSH: ICD-10-CM

## 2020-06-16 DIAGNOSIS — F33.9 RECURRENT DEPRESSION (HCC): ICD-10-CM

## 2020-06-16 DIAGNOSIS — R73.03 PREDIABETES: ICD-10-CM

## 2020-06-16 RX ORDER — SERTRALINE HYDROCHLORIDE 25 MG/1
25 TABLET, FILM COATED ORAL DAILY
Qty: 30 TAB | Refills: 0 | Status: SHIPPED | OUTPATIENT
Start: 2020-06-16 | End: 2020-06-25 | Stop reason: SDUPTHER

## 2020-06-16 NOTE — PROGRESS NOTES
Consent:   Goran Mcghee, who was seen by synchronous (real-time) audio-video technology, and/or her healthcare decision maker, is aware that this patient-initiated, Telehealth encounter on 6/16/2020 is a billable service, with coverage as determined by her insurance carrier. She is aware that she may receive a bill and has provided verbal consent to proceed: yes. .    SUBJECTIVE:  Goran Mcghee is a 37y.o. year old female   Chief Complaint   Patient presents with    Depression    Weight Gain     Excessive obesity    Blood sugar problem     Prediabetes       History of Present Illness:     She has been lost to follow-up. .  She says she was taking Paxil at a lower dose to last her longer. However she has run out of Paxil several months. Now her depression is back and she wants to go back on an antidepressant. Paxil did well last time. Depression is causing some impairment at work and home. She never had a manic or hypomanic episode. No history of drug use. No other psychiatric illness. For over two-week period she has 1) depressed mood, 2) loss of interest, 3)  Gaining weight,  4) hypersomnia, 5) Psychomotor retardation 6) Loss of energy, 7)  Diminished ability to think or concentrate and indecisiveness. No thoughts of death or suicidal ideation. She said when she was started on Paxil she was having insomnia and Paxil helped with it. However at this time she is sleeping more and she would like to get a medication that will not make her more sleepy. She was following diet and exercise for obesity, prediabetes and lipids. However, due to the COVID issue, she is having problems following. She also has history of elevated TSH.     Past Medical History:   Diagnosis Date    Deviated septum 2012    Multiple nasal polyps 2012    removed      Past Surgical History:   Procedure Laterality Date    HX TONSILLECTOMY  2008    HX WISDOM TEETH EXTRACTION  2010        Current Outpatient Medications   Medication Sig    levonorgestreL (MIRENA) 20 mcg/24 hours (5 yrs) 52 mg IUD 1 Device by IntraUTERine route once. No current facility-administered medications for this visit. Allergies   Allergen Reactions    Asa-Acetaminophen-Caff-Buffers Anaphylaxis     Aspirin portion        Family History   Problem Relation Age of Onset    Diabetes Mother         Social History     Tobacco Use    Smoking status: Never Smoker    Smokeless tobacco: Never Used   Substance Use Topics    Alcohol use: No     Alcohol/week: 0.0 standard drinks       Review of Systems:   Constitutional: No fever, chills, night sweats, malaise, dizziness. Ear/Nose/Throat: No ear/ throat pain, lesions, new speaking or hearing problems, nose bleed  Cardiovascular: No angina, palpitations, PND, orthopnea, lightheadedness, edema, claudication. Respiratory: No dyspnea, wheeze, pleurisy, hemoptysis, unusual cough or sputum. Gastrointestinal: No nausea/ vomiting, bowel habit change, pain, ANA symptoms, melena, hematochezia, anorexia. Neurological: No seizures, numbness, dizziness, speech abnormality, incontinence. Psychiatric: No agitation, confusion/disorientation, suicidal or homicidal ideation. Endocrine: Elevated TSH, obesity and hyperlipidemia. Denies any other complaints. Musculoskeletal: No joint swelling, instability, focal weakness, stiffness/rigidity, radicular pain. OBJECTIVE:  Physical Exam:   Constitutional: General Appearance:  Appears well-developed and morbidly obese. Nontoxic, in no acute distress. Mental status:  Alert and awake. Oriented to person/place/time. Able to follow commands. Eyes:   Sclera  Normal.   HENT:  Normocephalic, atraumatic. No Facial Asymmetry. No gaze palsy    Pulmonary: Respiratory effort: normal; no dyspnea. Neurological:   Speech normal.      Psychiatric:  Pleasant and cooperative. Slowed affect. No Hallucinations.     Musculoskeletal[de-identified] neck is supple. Lab Results   Component Value Date/Time    TSH 3.310 04/03/2018 10:31 AM     Lab Results   Component Value Date/Time    WBC 6.8 04/03/2018 10:31 AM    HGB 11.9 04/03/2018 10:31 AM    HCT 37.2 04/03/2018 10:31 AM    PLATELET 409 04/18/4085 10:31 AM    MCV 87 04/03/2018 10:31 AM     Lab Results   Component Value Date/Time    Sodium 137 04/03/2018 10:31 AM    Potassium 4.2 04/03/2018 10:31 AM    Chloride 101 04/03/2018 10:31 AM    CO2 21 04/03/2018 10:31 AM    Glucose 90 04/03/2018 10:31 AM    BUN 9 04/03/2018 10:31 AM    Creatinine 0.67 04/03/2018 10:31 AM    BUN/Creatinine ratio 13 04/03/2018 10:31 AM    GFR est  04/03/2018 10:31 AM    GFR est non- 04/03/2018 10:31 AM    Calcium 9.0 04/03/2018 10:31 AM    Bilirubin, total <0.2 04/03/2018 10:31 AM    Alk. phosphatase 60 04/03/2018 10:31 AM    Protein, total 6.6 04/03/2018 10:31 AM    Albumin 3.9 04/03/2018 10:31 AM    A-G Ratio 1.4 04/03/2018 10:31 AM    ALT (SGPT) 9 04/03/2018 10:31 AM     Lab Results   Component Value Date/Time    Cholesterol, total 137 04/03/2018 10:31 AM    HDL Cholesterol 54 04/03/2018 10:31 AM    LDL, calculated 66 04/03/2018 10:31 AM    VLDL, calculated 17 04/03/2018 10:31 AM    Triglyceride 86 04/03/2018 10:31 AM     Lab Results   Component Value Date/Time    Hemoglobin A1c 6.0 (H) 04/03/2018 10:31 AM       ASSESSMENT:     1. Recurrent depression ([address]    2. Obesity, morbid ([address]    3. Prediabetes    4. Elevated TSH        PLAN:     Orders Placed This Encounter    CBC WITH AUTOMATED DIFF    METABOLIC PANEL, COMPREHENSIVE    HEMOGLOBIN A1C WITH EAG    URINALYSIS W/ RFLX MICROSCOPIC    LIPID PANEL    TSH 3RD GENERATION    T4, FREE    sertraline (ZOLOFT) 25 mg tablet   Patient will go to the lab fasting and call for results. Discussed the patient's BMI with her.  The BMI follow up plan is as follows:   -dietary management education, guidance, and counseling  -encourage exercise  -monitor weight prescribed dietary intake    Pharmacologic Management: Medications reviewed with the patient. Start on Zoloft 25 mg daily. Will evaluate and increase as tolerated. SSRI is chosen because she did well with it in the past.    Discussed DDx, follow-up & work-up. Discussed risk/benefit & side effect of treatment. Follow up visit as planned, prn sooner. Low ADA salt diet, weightloss & graduated excecise. Health risk from non adherence discussed. Patient voiced understanding. Follow-up and Dispositions    · Return in about 1 week (around 6/23/2020). Denny Abdi is a 37 y.o. female who was evaluated by a video visit encounter for concerns as above. A caregiver was present when appropriate. Due to this being a TeleHealth encounter (During [facility] public health emergency), evaluation of the following organ systems was limited: Vitals/Constitutional/EENT/Resp/CV/GI//MS/Neuro/Skin/Heme-Lymph-Imm. Pursuant to the emergency declaration under the [facility], [facility]5 waiver authority and the [employer] and [employer] Act, this Virtual  Visit was conducted, with patient's (and/or legal guardian's) consent, to reduce the patient's risk of exposure to COVID-19 and provide necessary medical care. Services were provided through a video synchronous discussion virtually to substitute for in-person clinic visit. Patient and provider were located at their individual homes.     Michael Puentes MD

## 2020-06-18 LAB
ALBUMIN SERPL-MCNC: 4.2 G/DL (ref 3.8–4.8)
ALBUMIN/GLOB SERPL: 1.5 {RATIO} (ref 1.2–2.2)
ALP SERPL-CCNC: 63 IU/L (ref 39–117)
ALT SERPL-CCNC: 18 IU/L (ref 0–32)
APPEARANCE UR: CLEAR
AST SERPL-CCNC: 15 IU/L (ref 0–40)
BASOPHILS # BLD AUTO: 0 X10E3/UL (ref 0–0.2)
BASOPHILS NFR BLD AUTO: 1 %
BILIRUB SERPL-MCNC: 0.3 MG/DL (ref 0–1.2)
BILIRUB UR QL STRIP: NEGATIVE
BUN SERPL-MCNC: 7 MG/DL (ref 6–20)
BUN/CREAT SERPL: 9 (ref 9–23)
CALCIUM SERPL-MCNC: 9.2 MG/DL (ref 8.7–10.2)
CHLORIDE SERPL-SCNC: 101 MMOL/L (ref 96–106)
CHOLEST SERPL-MCNC: 140 MG/DL (ref 100–199)
CO2 SERPL-SCNC: 23 MMOL/L (ref 20–29)
COLOR UR: YELLOW
CREAT SERPL-MCNC: 0.78 MG/DL (ref 0.57–1)
EOSINOPHIL # BLD AUTO: 0.1 X10E3/UL (ref 0–0.4)
EOSINOPHIL NFR BLD AUTO: 2 %
ERYTHROCYTE [DISTWIDTH] IN BLOOD BY AUTOMATED COUNT: 12 % (ref 11.7–15.4)
EST. AVERAGE GLUCOSE BLD GHB EST-MCNC: 131 MG/DL
GLOBULIN SER CALC-MCNC: 2.8 G/DL (ref 1.5–4.5)
GLUCOSE SERPL-MCNC: 115 MG/DL (ref 65–99)
GLUCOSE UR QL: NEGATIVE
HBA1C MFR BLD: 6.2 % (ref 4.8–5.6)
HCT VFR BLD AUTO: 38.6 % (ref 34–46.6)
HDLC SERPL-MCNC: 36 MG/DL
HGB BLD-MCNC: 12.8 G/DL (ref 11.1–15.9)
HGB UR QL STRIP: NEGATIVE
IMM GRANULOCYTES # BLD AUTO: 0 X10E3/UL (ref 0–0.1)
IMM GRANULOCYTES NFR BLD AUTO: 0 %
INTERPRETATION, 910389: NORMAL
KETONES UR QL STRIP: NEGATIVE
LDLC SERPL CALC-MCNC: 91 MG/DL (ref 0–99)
LEUKOCYTE ESTERASE UR QL STRIP: NEGATIVE
LYMPHOCYTES # BLD AUTO: 2.9 X10E3/UL (ref 0.7–3.1)
LYMPHOCYTES NFR BLD AUTO: 45 %
MCH RBC QN AUTO: 28.8 PG (ref 26.6–33)
MCHC RBC AUTO-ENTMCNC: 33.2 G/DL (ref 31.5–35.7)
MCV RBC AUTO: 87 FL (ref 79–97)
MICRO URNS: NORMAL
MONOCYTES # BLD AUTO: 0.4 X10E3/UL (ref 0.1–0.9)
MONOCYTES NFR BLD AUTO: 6 %
NEUTROPHILS # BLD AUTO: 3 X10E3/UL (ref 1.4–7)
NEUTROPHILS NFR BLD AUTO: 46 %
NITRITE UR QL STRIP: NEGATIVE
PH UR STRIP: 5.5 [PH] (ref 5–7.5)
PLATELET # BLD AUTO: 340 X10E3/UL (ref 150–450)
POTASSIUM SERPL-SCNC: 4 MMOL/L (ref 3.5–5.2)
PROT SERPL-MCNC: 7 G/DL (ref 6–8.5)
PROT UR QL STRIP: NEGATIVE
RBC # BLD AUTO: 4.45 X10E6/UL (ref 3.77–5.28)
SODIUM SERPL-SCNC: 143 MMOL/L (ref 134–144)
SP GR UR: 1.01 (ref 1–1.03)
T4 FREE SERPL-MCNC: 0.96 NG/DL (ref 0.82–1.77)
TRIGL SERPL-MCNC: 66 MG/DL (ref 0–149)
TSH SERPL DL<=0.005 MIU/L-ACNC: 3.58 UIU/ML (ref 0.45–4.5)
UROBILINOGEN UR STRIP-MCNC: 0.2 MG/DL (ref 0.2–1)
VLDLC SERPL CALC-MCNC: 13 MG/DL (ref 5–40)
WBC # BLD AUTO: 6.4 X10E3/UL (ref 3.4–10.8)

## 2020-06-18 NOTE — PROGRESS NOTES
Blood work shows prediabetes is unchanged over last 5 years. The patient is advised to continue with diet and exercise as tolerated. Other lab tests are stable and good. Russel Aguilar

## 2020-06-25 ENCOUNTER — VIRTUAL VISIT (OUTPATIENT)
Dept: FAMILY MEDICINE CLINIC | Facility: CLINIC | Age: 35
End: 2020-06-25

## 2020-06-25 DIAGNOSIS — F33.9 RECURRENT DEPRESSION (HCC): Primary | ICD-10-CM

## 2020-06-25 DIAGNOSIS — E66.01 OBESITY, MORBID (HCC): ICD-10-CM

## 2020-06-25 DIAGNOSIS — R73.03 PREDIABETES: ICD-10-CM

## 2020-06-25 RX ORDER — SERTRALINE HYDROCHLORIDE 50 MG/1
50 TABLET, FILM COATED ORAL DAILY
Qty: 30 TAB | Refills: 1 | Status: SHIPPED | OUTPATIENT
Start: 2020-06-25 | End: 2020-07-21 | Stop reason: SDUPTHER

## 2020-06-25 NOTE — PROGRESS NOTES
Consent:   Shanta Long, who was seen by synchronous (real-time) audio-video technology, and/or her healthcare decision maker, is aware that this patient-initiated, Telehealth encounter on 6/25/2020 is a billable service, with coverage as determined by her insurance carrier. She is aware that she may receive a bill and has provided verbal consent to proceed: NA - Consent obtained within past 12 months. .      SUBJECTIVE:  Shanta Long is a 37y.o. year old female   Chief Complaint   Patient presents with    Depression       History of Present Illness:     She was evaluated last week because her depression was coming back. She was lost to follow-up for it and she was taking lower dose of Paxil until she ran out several months ago. She also said she was sleepy. She was started on Zoloft 25 mg daily. With that her daytime sleepiness is better. She denies any side effect of Zoloft. She feels that her mood is improving a little. Denies any history of nilton/hypomania. She is trying to work on her diet and exercise will improve her general health and prediabetes/obesity. Past Medical History:   Diagnosis Date    Deviated septum 2012    Multiple nasal polyps 2012    removed      Past Surgical History:   Procedure Laterality Date    HX TONSILLECTOMY  2008    HX WISDOM TEETH EXTRACTION  2010      Current Outpatient Medications   Medication Sig    sertraline (ZOLOFT) 25 mg tablet Take 1 Tab by mouth daily.  levonorgestreL (MIRENA) 20 mcg/24 hours (5 yrs) 52 mg IUD 1 Device by IntraUTERine route once. No current facility-administered medications for this visit. Allergies   Allergen Reactions    Asa-Acetaminophen-Caff-Buffers Anaphylaxis     Aspirin portion        Family History   Problem Relation Age of Onset    Diabetes Mother         Social History     Tobacco Use    Smoking status: Never Smoker    Smokeless tobacco: Never Used   Substance Use Topics    Alcohol use:  No Alcohol/week: 0.0 standard drinks       Review of Systems:   Constitutional: No fever, chills, night sweats, malaise, dizziness. Cardiovascular: No angina, palpitations, PND, orthopnea, lightheadedness, edema, claudication. Respiratory: No dyspnea, wheeze, pleurisy, hemoptysis, unusual cough or sputum. Gastrointestinal: No nausea/ vomiting, bowel habit change, pain, ANA symptoms, melena, hematochezia, anorexia. Neurological: No seizures, numbness, dizziness, speech abnormality, incontinence. Psychiatric: No agitation, confusion/disorientation, suicidal or homicidal ideation. Endocrine: Elevated TSH, obesity and hyperlipidemia. Denies any other complaints. Musculoskeletal: No focal weakness. OBJECTIVE:  Physical Exam:   Constitutional: General Appearance:  Appears well-developed and morbidly obese. Nontoxic, in no acute distress. Mental status:  Alert and awake. Oriented to person/place/time. Able to follow commands. Eyes:   Sclera  Normal.   HENT:  Normocephalic, atraumatic. No Facial Asymmetry. No gaze palsy    Pulmonary: Respiratory effort: normal; no dyspnea. Neurological:   Speech normal.      Psychiatric:  Pleasant and cooperative. Slowed affect. No Hallucinations. Musculoskeletal[de-identified] neck is supple.       Lab Results   Component Value Date/Time    TSH 3.580 06/17/2020 12:00 AM     Lab Results   Component Value Date/Time    WBC 6.4 06/17/2020 12:00 AM    HGB 12.8 06/17/2020 12:00 AM    HCT 38.6 06/17/2020 12:00 AM    PLATELET 245 19/47/1649 12:00 AM    MCV 87 06/17/2020 12:00 AM     Lab Results   Component Value Date/Time    Sodium 143 06/17/2020 12:00 AM    Potassium 4.0 06/17/2020 12:00 AM    Chloride 101 06/17/2020 12:00 AM    CO2 23 06/17/2020 12:00 AM    Glucose 115 (H) 06/17/2020 12:00 AM    BUN 7 06/17/2020 12:00 AM    Creatinine 0.78 06/17/2020 12:00 AM    BUN/Creatinine ratio 9 06/17/2020 12:00 AM    GFR est  06/17/2020 12:00 AM    GFR est non- 06/17/2020 12:00 AM    Calcium 9.2 06/17/2020 12:00 AM    Bilirubin, total 0.3 06/17/2020 12:00 AM    Alk. phosphatase 63 06/17/2020 12:00 AM    Protein, total 7.0 06/17/2020 12:00 AM    Albumin 4.2 06/17/2020 12:00 AM    A-G Ratio 1.5 06/17/2020 12:00 AM    ALT (SGPT) 18 06/17/2020 12:00 AM     Lab Results   Component Value Date/Time    Cholesterol, total 140 06/17/2020 12:00 AM    HDL Cholesterol 36 (L) 06/17/2020 12:00 AM    LDL, calculated 91 06/17/2020 12:00 AM    VLDL, calculated 13 06/17/2020 12:00 AM    Triglyceride 66 06/17/2020 12:00 AM     Lab Results   Component Value Date/Time    Hemoglobin A1c 6.2 (H) 06/17/2020 12:00 AM       ASSESSMENT:     1. Recurrent depression ([address]    2. Prediabetes    3. Obesity, morbid ([facility]        PLAN:     Orders Placed This Encounter    sertraline (ZOLOFT) 50 mg tablet     Pharmacologic Management: Medications reviewed with the patient. Increase Zoloft to 50 mg daily. Follow-up and Dispositions    · Return in about 1 month (around 7/25/2020). Low salt ADA diet, weightloss & graduated excecise. Discussed DDx, follow-up & work-up. Discussed risk/benefit & side effect of treatment. Health risk from non adherence discussed. Patient voiced understanding. Debra Martinez is a 37 y.o. female who was evaluated by a video visit encounter for concerns as above. A caregiver was present when appropriate. Due to this being a TeleHealth encounter (During [facility]- public health emergency), evaluation of the following organ systems was limited: Vitals/Constitutional/EENT/Resp/CV/GI//MS/Neuro/Skin/Heme-Lymph-Imm.   Pursuant to the emergency declaration under the [facility], [address] [place] [address] authority and the [employer] and [employer] Act, this Virtual  Visit was conducted, with patient's (and/or legal guardian's) consent, to reduce the patient's risk of exposure to COVID-19 and provide necessary medical care. Services were provided through a video synchronous discussion virtually to substitute for in-person clinic visit. Patient and provider were located at their individual homes.     Vilma Mayen MD

## 2020-07-21 ENCOUNTER — VIRTUAL VISIT (OUTPATIENT)
Dept: FAMILY MEDICINE CLINIC | Facility: CLINIC | Age: 35
End: 2020-07-21

## 2020-07-21 DIAGNOSIS — J30.9 CHRONIC ALLERGIC RHINITIS: ICD-10-CM

## 2020-07-21 DIAGNOSIS — F33.9 RECURRENT DEPRESSION (HCC): Primary | ICD-10-CM

## 2020-07-21 RX ORDER — SERTRALINE HYDROCHLORIDE 50 MG/1
50 TABLET, FILM COATED ORAL DAILY
Qty: 90 TAB | Refills: 0 | Status: SHIPPED | OUTPATIENT
Start: 2020-07-21 | End: 2020-10-16 | Stop reason: SDUPTHER

## 2020-07-21 RX ORDER — FLUTICASONE PROPIONATE 50 MCG
SPRAY, SUSPENSION (ML) NASAL
Qty: 3 BOTTLE | Refills: 0 | Status: SHIPPED | OUTPATIENT
Start: 2020-07-21 | End: 2020-10-16 | Stop reason: SDUPTHER

## 2020-07-21 NOTE — PROGRESS NOTES
Martin Lao is a 37 y.o. female evaluated via telephone on 7/21/2020. Consent:  She and/or health care decision maker is aware that that she may receive a bill for this telephone service, depending on her insurance coverage, and has provided verbal consent to proceed: Yes      SUBJECTIVE:  Martin Lao is a 37y.o. year old female   Chief Complaint   Patient presents with    Depression    Allergic Rhinitis       History of Present Illness:     She was evaluated last month because her depression was coming back. She was lost to follow-up for it and she was taking lower dose of Paxil until she ran out several months ago. She is now taking Zoloft 50 mg daily. With that her depression is gradually improving. She denies any side effect of Zoloft. Denies any nilton/hypomania. She has chronic allergic rhinitis. She uses PRN nasal rinse now. Used to also take Flonase in the past; but not in several months. Now she has recurrence of nose/sinus congestion with some rhinorrhea for the last 3-4 days. No fever/chills or malaise. She is trying to work on her diet and exercise will improve her general health and prediabetes/obesity. Past Medical History:   Diagnosis Date    Deviated septum 2012    Multiple nasal polyps 2012    removed      Past Surgical History:   Procedure Laterality Date    HX TONSILLECTOMY  2008    HX WISDOM TEETH EXTRACTION  2010      Current Outpatient Medications   Medication Sig    sertraline (ZOLOFT) 50 mg tablet Take 1 Tab by mouth daily.  levonorgestreL (MIRENA) 20 mcg/24 hours (5 yrs) 52 mg IUD 1 Device by IntraUTERine route once. No current facility-administered medications for this visit.         Allergies   Allergen Reactions    Asa-Acetaminophen-Caff-Buffers Anaphylaxis     Aspirin portion        Family History   Problem Relation Age of Onset    Diabetes Mother         Social History     Tobacco Use    Smoking status: Never Smoker    Smokeless tobacco: Never Used   Substance Use Topics    Alcohol use: No     Alcohol/week: 0.0 standard drinks       Review of Systems:   Constitutional: No fever, chills, night sweats, malaise, dizziness. Ear/Nose/Throat: nasal congestion & discharge as above; no earache, lesions, new speaking or hearing problems, nose bleeds. Cardiovascular: No angina, palpitations, PND, orthopnea, lightheadedness, edema, claudication. Respiratory: No dyspnea, wheeze, pleurisy, hemoptysis, unusual cough or sputum. Gastrointestinal: No nausea/ vomiting, bowel habit change, pain, ANA symptoms, melena, hematochezia, anorexia. Neurological: No seizures, numbness, dizziness, speech abnormality, incontinence. Psychiatric: No agitation, confusion/disorientation, suicidal or homicidal ideation. Endocrine: Elevated TSH, obesity and hyperlipidemia. Denies any other complaints. Musculoskeletal: No focal weakness. OBJECTIVE:  Physical Exam:     Constitutional: in no acute distress. Pulmonary: no dyspnea. Psychiatric[de-identified] Pleasant and cooperative. Oriented to time, place and person. Neurological[de-identified] Speech normal.      Lab Results   Component Value Date/Time    TSH 3.580 06/17/2020 12:00 AM     Lab Results   Component Value Date/Time    WBC 6.4 06/17/2020 12:00 AM    HGB 12.8 06/17/2020 12:00 AM    HCT 38.6 06/17/2020 12:00 AM    PLATELET 373 24/94/8131 12:00 AM    MCV 87 06/17/2020 12:00 AM     Lab Results   Component Value Date/Time    Sodium 143 06/17/2020 12:00 AM    Potassium 4.0 06/17/2020 12:00 AM    Chloride 101 06/17/2020 12:00 AM    CO2 23 06/17/2020 12:00 AM    Glucose 115 (H) 06/17/2020 12:00 AM    BUN 7 06/17/2020 12:00 AM    Creatinine 0.78 06/17/2020 12:00 AM    BUN/Creatinine ratio 9 06/17/2020 12:00 AM    GFR est  06/17/2020 12:00 AM    GFR est non- 06/17/2020 12:00 AM    Calcium 9.2 06/17/2020 12:00 AM    Bilirubin, total 0.3 06/17/2020 12:00 AM    Alk.  phosphatase 63 06/17/2020 12:00 AM Protein, total 7.0 06/17/2020 12:00 AM    Albumin 4.2 06/17/2020 12:00 AM    A-G Ratio 1.5 06/17/2020 12:00 AM    ALT (SGPT) 18 06/17/2020 12:00 AM     Lab Results   Component Value Date/Time    Cholesterol, total 140 06/17/2020 12:00 AM    HDL Cholesterol 36 (L) 06/17/2020 12:00 AM    LDL, calculated 91 06/17/2020 12:00 AM    VLDL, calculated 13 06/17/2020 12:00 AM    Triglyceride 66 06/17/2020 12:00 AM     Lab Results   Component Value Date/Time    Hemoglobin A1c 6.2 (H) 06/17/2020 12:00 AM       ASSESSMENT:     1. Recurrent depression ([address]    2. Chronic allergic rhinitis        PLAN:     Orders Placed This Encounter    sertraline (ZOLOFT) 50 mg tablet    fluticasone propionate (FLONASE) 50 mcg/actuation nasal spray     Pharmacologic Management: Medications reviewed with the patient. Stay on Zoloft to 50 mg daily. Continue with nasal rinse. Go back on Flonase 2 sprays nightly- proper use discussed. Allergy care. Low salt ADA diet, weightloss & graduated excecise. Discussed DDx, follow-up & work-up. Discussed risk/benefit & side effect of treatment. Health risk from non adherence discussed. Patient voiced understanding. Kesha Larios is a 37 y.o. female evaluated via telephone on 7/21/2020. I affirm that this is a Patient Initiated Episode with an Established Patient who has not had a related appointment within my department in the past 7 days or scheduled within the next 24 hours.     Total Time Spent: 25 minutes      Haydee Kerr MD

## 2020-10-16 ENCOUNTER — TELEPHONE (OUTPATIENT)
Dept: FAMILY MEDICINE CLINIC | Age: 35
End: 2020-10-16

## 2020-10-16 RX ORDER — SERTRALINE HYDROCHLORIDE 50 MG/1
50 TABLET, FILM COATED ORAL DAILY
Qty: 90 TAB | Refills: 0 | Status: SHIPPED | OUTPATIENT
Start: 2020-10-16 | End: 2021-01-21 | Stop reason: SDUPTHER

## 2020-10-16 RX ORDER — FLUTICASONE PROPIONATE 50 MCG
SPRAY, SUSPENSION (ML) NASAL
Qty: 3 BOTTLE | Refills: 0 | Status: SHIPPED | OUTPATIENT
Start: 2020-10-16 | End: 2021-01-21 | Stop reason: SDUPTHER

## 2020-10-20 ENCOUNTER — VIRTUAL VISIT (OUTPATIENT)
Dept: FAMILY MEDICINE CLINIC | Age: 35
End: 2020-10-20
Payer: MEDICAID

## 2020-10-20 DIAGNOSIS — F33.9 RECURRENT DEPRESSION (HCC): Primary | ICD-10-CM

## 2020-10-20 DIAGNOSIS — R73.03 PREDIABETES: ICD-10-CM

## 2020-10-20 DIAGNOSIS — J30.9 CHRONIC ALLERGIC RHINITIS: ICD-10-CM

## 2020-10-20 PROCEDURE — 99443 PR PHYS/QHP TELEPHONE EVALUATION 21-30 MIN: CPT | Performed by: FAMILY MEDICINE

## 2020-10-20 NOTE — PROGRESS NOTES
Sherren Goodie is a 37 y.o. female evaluated via telephone on 10/20/2020. Consent:  She and/or health care decision maker is aware that that she may receive a bill for this telephone service, depending on her insurance coverage, and has provided verbal consent to proceed: Yes      SUBJECTIVE:  Sherren Goodie is a 37y.o. year old female   No chief complaint on file. History of Present Illness:     She was evaluated 4 months because her depression was coming back. She was lost to follow-up for it and she was taking lower dose of Paxil until she ran out several months ago. She is now taking Zoloft 50 mg daily. With that her depression is significantly improved and she is functioning normally. She denies any side effect of Zoloft. Denies any nilton/hypomania. Her chronic allergic rhinitis with nose/sinus congestion with rhinorrhea now controlled. No fever/chills or malaise. She is trying to work on her diet and exercise to improve her general health and prediabetes/obesity. Denies any new complaints. Past Medical History:   Diagnosis Date    Deviated septum 2012    Multiple nasal polyps 2012    removed      Past Surgical History:   Procedure Laterality Date    HX TONSILLECTOMY  2008    HX WISDOM TEETH EXTRACTION  2010      Current Outpatient Medications   Medication Sig    sertraline (ZOLOFT) 50 mg tablet Take 1 Tab by mouth daily.  fluticasone propionate (FLONASE) 50 mcg/actuation nasal spray 2 sprays each nostril nightly.  levonorgestreL (MIRENA) 20 mcg/24 hours (5 yrs) 52 mg IUD 1 Device by IntraUTERine route once. No current facility-administered medications for this visit.         Allergies   Allergen Reactions    Asa-Acetaminophen-Caff-Buffers Anaphylaxis     Aspirin portion        Family History   Problem Relation Age of Onset    Diabetes Mother         Social History     Tobacco Use    Smoking status: Never Smoker    Smokeless tobacco: Never Used   Substance Use Topics    Alcohol use: No     Alcohol/week: 0.0 standard drinks       Review of Systems:   Constitutional: No fever, chills, night sweats, malaise, dizziness. Ear/Nose/Throat: no sore throat, sinus pain, earache, lesions, new speaking or hearing problems, nose bleeds. Cardiovascular: No angina, palpitations, PND, orthopnea, lightheadedness, edema, claudication. Respiratory: No dyspnea, wheeze, pleurisy, hemoptysis, unusual cough or sputum. Gastrointestinal: No nausea/ vomiting, bowel habit change, pain, ANA symptoms, melena, hematochezia, anorexia. Neurological: No seizures, numbness, dizziness, speech abnormality, incontinence. Psychiatric: No agitation, confusion/disorientation, suicidal or homicidal ideation. Endocrine: Prediabetes is stable. Obesity. Denies any other complaints. Musculoskeletal: No focal weakness. OBJECTIVE:  Physical Exam:     Constitutional: in no acute distress. Pulmonary: no dyspnea. Psychiatric[de-identified] Pleasant and cooperative. Oriented to time, place and person. Neurological[de-identified] Speech normal.      Lab Results   Component Value Date/Time    TSH 3.580 06/17/2020 12:00 AM     Lab Results   Component Value Date/Time    WBC 6.4 06/17/2020 12:00 AM    HGB 12.8 06/17/2020 12:00 AM    HCT 38.6 06/17/2020 12:00 AM    PLATELET 889 42/25/1456 12:00 AM    MCV 87 06/17/2020 12:00 AM     Lab Results   Component Value Date/Time    Sodium 143 06/17/2020 12:00 AM    Potassium 4.0 06/17/2020 12:00 AM    Chloride 101 06/17/2020 12:00 AM    CO2 23 06/17/2020 12:00 AM    Glucose 115 (H) 06/17/2020 12:00 AM    BUN 7 06/17/2020 12:00 AM    Creatinine 0.78 06/17/2020 12:00 AM    BUN/Creatinine ratio 9 06/17/2020 12:00 AM    GFR est  06/17/2020 12:00 AM    GFR est non- 06/17/2020 12:00 AM    Calcium 9.2 06/17/2020 12:00 AM    Bilirubin, total 0.3 06/17/2020 12:00 AM    Alk.  phosphatase 63 06/17/2020 12:00 AM    Protein, total 7.0 06/17/2020 12:00 AM    Albumin 4.2 06/17/2020 12:00 AM    A-G Ratio 1.5 06/17/2020 12:00 AM    ALT (SGPT) 18 06/17/2020 12:00 AM     Lab Results   Component Value Date/Time    Cholesterol, total 140 06/17/2020 12:00 AM    HDL Cholesterol 36 (L) 06/17/2020 12:00 AM    LDL, calculated 91 06/17/2020 12:00 AM    VLDL, calculated 13 06/17/2020 12:00 AM    Triglyceride 66 06/17/2020 12:00 AM     Lab Results   Component Value Date/Time    Hemoglobin A1c 6.2 (H) 06/17/2020 12:00 AM       ASSESSMENT:     1. Recurrent depression ([address]    2. Chronic allergic rhinitis    3. Prediabetes        PLAN:       Pharmacologic Management: Medications reviewed with the patient. Stay on Zoloft to 50 mg daily and Flonase. Allergy care. Low salt ADA diet, weightloss & graduated excecise. Discussed DDx, follow-up & work-up. Discussed risk/benefit & side effect of treatment. Health risk from non adherence discussed. Patient voiced understanding. Mario Nunez is a 37 y.o. female evaluated via telephone on 10/20/2020. I affirm that this is a Patient Initiated Episode with an Established Patient who has not had a related appointment within my department in the past 7 days or scheduled within the next 24 hours.     Total Time Spent: 22 minutes      Ralph Amin MD

## 2021-01-21 RX ORDER — FLUTICASONE PROPIONATE 50 MCG
SPRAY, SUSPENSION (ML) NASAL
Qty: 3 BOTTLE | Refills: 0 | Status: SHIPPED | OUTPATIENT
Start: 2021-01-21 | End: 2021-04-26 | Stop reason: SDUPTHER

## 2021-01-21 RX ORDER — SERTRALINE HYDROCHLORIDE 50 MG/1
50 TABLET, FILM COATED ORAL DAILY
Qty: 90 TAB | Refills: 0 | Status: SHIPPED | OUTPATIENT
Start: 2021-01-21 | End: 2021-04-26 | Stop reason: SDUPTHER

## 2021-03-17 ENCOUNTER — VIRTUAL VISIT (OUTPATIENT)
Dept: FAMILY MEDICINE CLINIC | Age: 36
End: 2021-03-17
Payer: MEDICAID

## 2021-03-17 DIAGNOSIS — L08.9 INFECTION OF TOE: Primary | ICD-10-CM

## 2021-03-17 PROCEDURE — 99213 OFFICE O/P EST LOW 20 MIN: CPT | Performed by: PHYSICIAN ASSISTANT

## 2021-03-17 RX ORDER — CEPHALEXIN 500 MG/1
500 CAPSULE ORAL 3 TIMES DAILY
Qty: 21 CAP | Refills: 0 | Status: SHIPPED | OUTPATIENT
Start: 2021-03-17 | End: 2021-03-24

## 2021-04-02 NOTE — PATIENT INSTRUCTIONS
Toenail or Fingernail Avulsion: Care Instructions Your Care Instructions Losing a toenail or fingernail because of an injury is called avulsion. The nail may be completely or partially torn off after a trauma to the area. Your doctor may have removed the nail, put part of it back into place, or repaired the nail bed. Your toe or finger may be sore after treatment. You may have stitches. You may have some swelling, color changes, and bloody crusting on or around the wound for 2 or 3 days. This is normal. Taking good care of your wound at home will help it heal quickly and reduce your chance of infection. The wound should heal within a few weeks. If completely removed, fingernails may take 6 months to grow back. Toenails may take 12 to 18 months to grow back. Injured nails may look different when they grow back. Follow-up care is a key part of your treatment and safety. Be sure to make and go to all appointments, and call your doctor if you are having problems. It's also a good idea to know your test results and keep a list of the medicines you take. How can you care for yourself at home? · If possible, prop up the injured area on a pillow anytime you sit or lie down during the next 3 days. Try to keep it above the level of your heart. This will help reduce swelling. · Leave the bandage on, and if you have stitches, do not get them wet for the first 24 to 48 hours. Use a plastic bag to cover the area when you shower. · If your doctor told you how to care for your wound, follow your doctor's instructions. If you did not get instructions, follow this general advice: ? After the first 24 to 48 hours, you can remove the bandage and gently wash around the wound with clean water 2 times a day. If the bandage sticks to the wound, use warm water to loosen it. Do not scrub or soak the area. ? You may cover the wound with a thin layer of petroleum jelly, such as Vaseline, and a nonstick bandage. ?  Apply more petroleum jelly and replace the bandage as needed. · Do not go swimming. · If you have stitches, do not remove them on your own. Your doctor will tell you when to return to have the stitches removed. · Be safe with medicines. Take pain medicines exactly as directed. ? If the doctor gave you a prescription medicine for pain, take it as prescribed. ? If you are not taking a prescription pain medicine, ask your doctor if you can take an over-the-counter medicine. · If your doctor prescribed antibiotics, take them as directed. Do not stop taking them just because you feel better. You need to take the full course of antibiotics. When should you call for help? Call your doctor now or seek immediate medical care if: 
  · The skin near the wound is cool or pale or changes color.  
  · The wound starts to bleed, and blood soaks through the bandage. Oozing small amounts of blood is normal.  
  · You have signs of infection, such as: 
? Increased pain, swelling, warmth, or redness. ? Red streaks leading from your toe or finger. ? Pus draining from your toe or finger. ? Swollen lymph nodes in your neck, armpits, or groin. ? A fever. Watch closely for changes in your health, and be sure to contact your doctor if: 
  · You have problems with the nail as it grows back.  
  · You do not get better as expected. Where can you learn more? Go to http://www.gray.com/ Enter K389 in the search box to learn more about \"Toenail or Fingernail Avulsion: Care Instructions. \" Current as of: July 2, 2020               Content Version: 12.8 © 2006-2021 [employer]. Care instructions adapted under license by [employer] (which disclaims liability or warranty for this information).  If you have questions about a medical condition or this instruction, always ask your healthcare professional. [address] any warranty or liability for your use of this information.

## 2021-04-02 NOTE — PROGRESS NOTES
HISTORY OF PRESENT ILLNESS  Teresita Alberto is a 37 y.o. female. HPI   Pt is being seen via doxy. me for injury to her right 5th toe. She states that 10d ago she dropped a box of crafts on her toe and it has been swollen and tender. It was improving but then her nail started oozing yellow drainage, lifting off nail bed, and became red and warm. She has tried soaking it and applying abx oint but it is not helping. Denies feer, chills, numbness/tingling, radiating pain, and rash. Allergies   Allergen Reactions    Asa-Acetaminophen-Caff-Buffers Anaphylaxis     Aspirin portion       Current Outpatient Medications   Medication Sig    sertraline (ZOLOFT) 50 mg tablet Take 1 Tab by mouth daily.  fluticasone propionate (FLONASE) 50 mcg/actuation nasal spray 2 sprays each nostril nightly.  levonorgestreL (MIRENA) 20 mcg/24 hours (5 yrs) 52 mg IUD 1 Device by IntraUTERine route once. No current facility-administered medications for this visit. Review of Systems   Constitutional: Negative. Negative for chills, fever and malaise/fatigue. HENT: Negative. Negative for congestion, ear pain, sore throat and tinnitus. Eyes: Negative. Negative for blurred vision, double vision and photophobia. Respiratory: Negative. Negative for cough, shortness of breath and wheezing. Cardiovascular: Negative. Negative for chest pain, palpitations and leg swelling. Gastrointestinal: Negative. Negative for abdominal pain, heartburn, nausea and vomiting. Genitourinary: Negative. Negative for dysuria, frequency, hematuria and urgency. Musculoskeletal: Positive for joint pain (right 5th toe). Negative for back pain, myalgias and neck pain. Skin: Negative. Negative for itching and rash. Neurological: Negative. Negative for dizziness, tingling, tremors and headaches. Psychiatric/Behavioral: Positive for depression (controlled on meds). Negative for memory loss.  The patient is not nervous/anxious and does not have insomnia. Physical Exam  Constitutional:       General: She is not in acute distress. Appearance: Normal appearance. She is not ill-appearing. HENT:      Head: Normocephalic and atraumatic. Pulmonary:      Effort: No respiratory distress. Feet:      Right foot:      Skin integrity: Erythema (right 5th toe) present. Neurological:      Mental Status: She is alert and oriented to person, place, and time. Psychiatric:         Mood and Affect: Mood normal.         Behavior: Behavior normal.         Thought Content: Thought content normal.         Judgment: Judgment normal.         ASSESSMENT and PLAN    ICD-10-CM ICD-9-CM    1. Infection of toe  L08.9 686.9 cephALEXin (KEFLEX) 500 mg capsule     Follow-up and Dispositions    · Return if symptoms worsen or fail to improve. Pt expressed understanding of visit summary and plans for any follow ups or referrals as well as any medications prescribed. Seen by synchronous (real-time) audio-video technology via doxy. me on 3/17/2021    The patient is aware that this patient-initiated Telehealth encounter is a billable service, with coverage as determined by his or her insurance carrier. He/She is aware that they may receive a bill and has provided verbal consent to proceed: Yes        I was in the office while conducting this encounter.

## 2021-04-26 NOTE — TELEPHONE ENCOUNTER
Requested Prescriptions     Pending Prescriptions Disp Refills    sertraline (ZOLOFT) 50 mg tablet 90 Tab 0     Sig: Take 1 Tab by mouth daily.  fluticasone propionate (FLONASE) 50 mcg/actuation nasal spray 3 Bottle 0     Si sprays each nostril nightly. No future appointments.

## 2021-04-27 RX ORDER — FLUTICASONE PROPIONATE 50 MCG
SPRAY, SUSPENSION (ML) NASAL
Qty: 3 BOTTLE | Refills: 0 | Status: SHIPPED | OUTPATIENT
Start: 2021-04-27 | End: 2021-07-06 | Stop reason: SDUPTHER

## 2021-04-27 RX ORDER — SERTRALINE HYDROCHLORIDE 50 MG/1
50 TABLET, FILM COATED ORAL DAILY
Qty: 90 TAB | Refills: 0 | Status: SHIPPED | OUTPATIENT
Start: 2021-04-27 | End: 2021-07-06 | Stop reason: SDUPTHER

## 2021-07-06 NOTE — TELEPHONE ENCOUNTER
Requested Prescriptions     Pending Prescriptions Disp Refills    fluticasone propionate (FLONASE) 50 mcg/actuation nasal spray 3 Bottle 0     Si sprays each nostril nightly.  sertraline (ZOLOFT) 50 mg tablet 90 Tablet 0     Sig: Take 1 Tablet by mouth daily. No future appointments.

## 2021-07-13 ENCOUNTER — VIRTUAL VISIT (OUTPATIENT)
Dept: FAMILY MEDICINE CLINIC | Age: 36
End: 2021-07-13

## 2021-07-16 ENCOUNTER — VIRTUAL VISIT (OUTPATIENT)
Dept: FAMILY MEDICINE CLINIC | Age: 36
End: 2021-07-16
Payer: MEDICAID

## 2021-07-16 DIAGNOSIS — E55.9 VITAMIN D DEFICIENCY: ICD-10-CM

## 2021-07-16 DIAGNOSIS — F33.9 RECURRENT DEPRESSION (HCC): Primary | ICD-10-CM

## 2021-07-16 DIAGNOSIS — R73.03 PREDIABETES: ICD-10-CM

## 2021-07-16 DIAGNOSIS — E66.01 OBESITY, MORBID (HCC): ICD-10-CM

## 2021-07-16 DIAGNOSIS — J30.9 ALLERGIC RHINITIS, UNSPECIFIED SEASONALITY, UNSPECIFIED TRIGGER: ICD-10-CM

## 2021-07-16 PROCEDURE — 99214 OFFICE O/P EST MOD 30 MIN: CPT | Performed by: PHYSICIAN ASSISTANT

## 2021-07-16 RX ORDER — SERTRALINE HYDROCHLORIDE 50 MG/1
50 TABLET, FILM COATED ORAL DAILY
Qty: 90 TABLET | Refills: 0 | Status: SHIPPED | OUTPATIENT
Start: 2021-07-16 | End: 2021-07-16 | Stop reason: SDUPTHER

## 2021-07-16 RX ORDER — SERTRALINE HYDROCHLORIDE 50 MG/1
50 TABLET, FILM COATED ORAL DAILY
Qty: 90 TABLET | Refills: 3 | Status: SHIPPED | OUTPATIENT
Start: 2021-07-16 | End: 2022-08-01

## 2021-07-16 RX ORDER — FLUTICASONE PROPIONATE 50 MCG
SPRAY, SUSPENSION (ML) NASAL
Qty: 3 BOTTLE | Refills: 0 | Status: SHIPPED | OUTPATIENT
Start: 2021-07-16 | End: 2021-07-16 | Stop reason: SDUPTHER

## 2021-07-16 RX ORDER — FLUTICASONE PROPIONATE 50 MCG
SPRAY, SUSPENSION (ML) NASAL
Qty: 3 BOTTLE | Refills: 3 | Status: SHIPPED | OUTPATIENT
Start: 2021-07-16 | End: 2022-08-01

## 2021-07-16 NOTE — PROGRESS NOTES
HISTORY OF PRESENT ILLNESS  Bob Medina is a 31 y.o. female. HPI   Pt is being seen via doxy. me for f/u on her depression and allergies. She is doing well on meds and needs refills. She does have concerns with her weight and states she has been exercising more and feels better but has not had weight loss. She does drink sweet tea and sodas and we discussed the amt of calories and sugar in those alone. Also discussed her A1c was prediabetic range last year so will recheck that in 2-3 weeks along with her thyroid level as it has been high in the past.      Allergies   Allergen Reactions    Asa-Acetaminophen-Caff-Buffers Anaphylaxis     Aspirin portion       Current Outpatient Medications   Medication Sig    fluticasone propionate (FLONASE) 50 mcg/actuation nasal spray 2 sprays each nostril nightly.  sertraline (ZOLOFT) 50 mg tablet Take 1 Tablet by mouth daily.  levonorgestreL (MIRENA) 20 mcg/24 hours (5 yrs) 52 mg IUD 1 Device by IntraUTERine route once. No current facility-administered medications for this visit. Review of Systems   Constitutional: Negative for chills, fever and malaise/fatigue. Trouble loosing weight   HENT: Negative. Negative for congestion, ear pain, sore throat and tinnitus. Eyes: Negative. Negative for blurred vision, double vision and photophobia. Respiratory: Negative. Negative for cough, shortness of breath and wheezing. Cardiovascular: Negative. Negative for chest pain, palpitations and leg swelling. Gastrointestinal: Negative. Negative for abdominal pain, heartburn, nausea and vomiting. Genitourinary: Negative. Negative for dysuria, frequency, hematuria and urgency. Musculoskeletal: Negative. Negative for back pain, joint pain, myalgias and neck pain. Skin: Negative. Negative for itching and rash. Neurological: Negative. Negative for dizziness, tingling, tremors and headaches.    Endo/Heme/Allergies: Positive for environmental allergies. Psychiatric/Behavioral: Positive for depression (controlled on meds). Negative for hallucinations, memory loss, substance abuse and suicidal ideas. The patient is not nervous/anxious and does not have insomnia. Physical Exam  Constitutional:       General: She is not in acute distress. Appearance: Normal appearance. She is not ill-appearing. HENT:      Head: Normocephalic and atraumatic. Pulmonary:      Effort: No respiratory distress. Neurological:      Mental Status: She is alert and oriented to person, place, and time. Psychiatric:         Mood and Affect: Mood normal.         Behavior: Behavior normal.         Thought Content: Thought content normal.         Judgment: Judgment normal.         ASSESSMENT and PLAN    ICD-10-CM ICD-9-CM    1. Recurrent depression (HCC)  F33.9 296.30    2. Prediabetes  G95.01 137.06 METABOLIC PANEL, COMPREHENSIVE      TSH 3RD GENERATION      T4, FREE      LIPID PANEL      CBC WITH AUTOMATED DIFF      HEMOGLOBIN A1C WITH EAG   3. Obesity, morbid ([facility] [address]  E66.01 278.01 TSH 3RD GENERATION      T4, FREE      LIPID PANEL   4. Vitamin D deficiency  E55.9 268.9 VITAMIN D, 25 HYDROXY     Follow-up and Dispositions    · Return in about 4 weeks (around 8/13/2021) for follow up labs. Pt expressed understanding of visit summary and plans for any follow ups or referrals as well as any medications prescribed. Seen by synchronous (real-time) audio-video technology via doxy. me on 7/16/2021    The patient is aware that this patient-initiated Telehealth encounter is a billable service, with coverage as determined by his or her insurance carrier. He/She is aware that they may receive a bill and has provided verbal consent to proceed: Yes        I was in the office while conducting this encounter.

## 2021-07-16 NOTE — PATIENT INSTRUCTIONS

## 2021-09-28 ENCOUNTER — HOSPITAL ENCOUNTER (OUTPATIENT)
Dept: LAB | Age: 36
Discharge: HOME | End: 2021-09-28

## 2021-09-28 LAB — XX-LABCORP SPECIMEN COL,LCBCF: NORMAL

## 2021-09-28 PROCEDURE — 99001 SPECIMEN HANDLING PT-LAB: CPT

## 2021-09-30 LAB
25(OH)D3+25(OH)D2 SERPL-MCNC: 10.9 NG/ML (ref 30–100)
ALBUMIN SERPL-MCNC: 4.1 G/DL (ref 3.8–4.8)
ALBUMIN/GLOB SERPL: 1.5 {RATIO} (ref 1.2–2.2)
ALP SERPL-CCNC: 69 IU/L (ref 44–121)
ALT SERPL-CCNC: 21 IU/L (ref 0–32)
AST SERPL-CCNC: 17 IU/L (ref 0–40)
BASOPHILS # BLD AUTO: 0 X10E3/UL (ref 0–0.2)
BASOPHILS NFR BLD AUTO: 0 %
BILIRUB SERPL-MCNC: 0.3 MG/DL (ref 0–1.2)
BUN SERPL-MCNC: 8 MG/DL (ref 6–20)
BUN/CREAT SERPL: 9 (ref 9–23)
CALCIUM SERPL-MCNC: 9.5 MG/DL (ref 8.7–10.2)
CHLORIDE SERPL-SCNC: 104 MMOL/L (ref 96–106)
CHOLEST SERPL-MCNC: 160 MG/DL (ref 100–199)
CO2 SERPL-SCNC: 23 MMOL/L (ref 20–29)
CREAT SERPL-MCNC: 0.89 MG/DL (ref 0.57–1)
EOSINOPHIL # BLD AUTO: 0.1 X10E3/UL (ref 0–0.4)
EOSINOPHIL NFR BLD AUTO: 1 %
ERYTHROCYTE [DISTWIDTH] IN BLOOD BY AUTOMATED COUNT: 12.6 % (ref 11.7–15.4)
EST. AVERAGE GLUCOSE BLD GHB EST-MCNC: 137 MG/DL
GLOBULIN SER CALC-MCNC: 2.8 G/DL (ref 1.5–4.5)
GLUCOSE SERPL-MCNC: 98 MG/DL (ref 65–99)
HBA1C MFR BLD: 6.4 % (ref 4.8–5.6)
HCT VFR BLD AUTO: 37.3 % (ref 34–46.6)
HDLC SERPL-MCNC: 40 MG/DL
HGB BLD-MCNC: 12 G/DL (ref 11.1–15.9)
IMM GRANULOCYTES # BLD AUTO: 0 X10E3/UL (ref 0–0.1)
IMM GRANULOCYTES NFR BLD AUTO: 0 %
IMP & REVIEW OF LAB RESULTS: NORMAL
LDLC SERPL CALC-MCNC: 104 MG/DL (ref 0–99)
LYMPHOCYTES # BLD AUTO: 2.7 X10E3/UL (ref 0.7–3.1)
LYMPHOCYTES NFR BLD AUTO: 37 %
MCH RBC QN AUTO: 28 PG (ref 26.6–33)
MCHC RBC AUTO-ENTMCNC: 32.2 G/DL (ref 31.5–35.7)
MCV RBC AUTO: 87 FL (ref 79–97)
MONOCYTES # BLD AUTO: 0.4 X10E3/UL (ref 0.1–0.9)
MONOCYTES NFR BLD AUTO: 5 %
NEUTROPHILS # BLD AUTO: 4.1 X10E3/UL (ref 1.4–7)
NEUTROPHILS NFR BLD AUTO: 57 %
PLATELET # BLD AUTO: 353 X10E3/UL (ref 150–450)
POTASSIUM SERPL-SCNC: 4.8 MMOL/L (ref 3.5–5.2)
PROT SERPL-MCNC: 6.9 G/DL (ref 6–8.5)
RBC # BLD AUTO: 4.28 X10E6/UL (ref 3.77–5.28)
SODIUM SERPL-SCNC: 140 MMOL/L (ref 134–144)
SPECIMEN STATUS REPORT, ROLRST: NORMAL
T4 FREE SERPL-MCNC: 1.03 NG/DL (ref 0.82–1.77)
TRIGL SERPL-MCNC: 82 MG/DL (ref 0–149)
TSH SERPL DL<=0.005 MIU/L-ACNC: 4.45 UIU/ML (ref 0.45–4.5)
VLDLC SERPL CALC-MCNC: 16 MG/DL (ref 5–40)
WBC # BLD AUTO: 7.3 X10E3/UL (ref 3.4–10.8)

## 2021-10-14 ENCOUNTER — TELEPHONE (OUTPATIENT)
Dept: FAMILY MEDICINE CLINIC | Age: 36
End: 2021-10-14

## 2021-10-14 DIAGNOSIS — E55.9 VITAMIN D DEFICIENCY: Primary | ICD-10-CM

## 2021-10-15 RX ORDER — ERGOCALCIFEROL 1.25 MG/1
50000 CAPSULE ORAL
Qty: 12 CAPSULE | Refills: 1 | Status: SHIPPED | OUTPATIENT
Start: 2021-10-15 | End: 2022-04-06

## 2021-10-15 NOTE — TELEPHONE ENCOUNTER
Please advise pt her vit D was low so I am sending an script to her pharmacy. Her LDL was slightly high at 104 and her A1c increased slightly to 6.4. She should work on diet and exercise to lower these and recheck in 6mo.

## 2021-10-29 ENCOUNTER — OFFICE VISIT (OUTPATIENT)
Dept: FAMILY MEDICINE CLINIC | Age: 36
End: 2021-10-29
Payer: MEDICAID

## 2021-10-29 VITALS
BODY MASS INDEX: 49.87 KG/M2 | OXYGEN SATURATION: 97 % | SYSTOLIC BLOOD PRESSURE: 126 MMHG | DIASTOLIC BLOOD PRESSURE: 81 MMHG | HEIGHT: 62 IN | WEIGHT: 271 LBS | TEMPERATURE: 97.3 F | HEART RATE: 86 BPM | RESPIRATION RATE: 17 BRPM

## 2021-10-29 DIAGNOSIS — M25.552 LEFT HIP PAIN: ICD-10-CM

## 2021-10-29 DIAGNOSIS — M54.42 CHRONIC LEFT-SIDED LOW BACK PAIN WITH LEFT-SIDED SCIATICA: ICD-10-CM

## 2021-10-29 DIAGNOSIS — M25.552 LEFT HIP PAIN: Primary | ICD-10-CM

## 2021-10-29 DIAGNOSIS — G89.29 CHRONIC LEFT-SIDED LOW BACK PAIN WITH LEFT-SIDED SCIATICA: ICD-10-CM

## 2021-10-29 PROCEDURE — 99213 OFFICE O/P EST LOW 20 MIN: CPT | Performed by: PHYSICIAN ASSISTANT

## 2021-10-29 NOTE — PROGRESS NOTES
HISTORY OF PRESENT ILLNESS  Felipa Cox is a 31 y.o. female. HPI   Pt is being seen for low left sided back pain and left hip pain for the past few months and is worsening. Denies injury/trauma, bowel or bladder dysfunction, leg weakness, numbness/tingling, and radiating pain. Allergies   Allergen Reactions    Asa-Acetaminophen-Caff-Buffers Anaphylaxis     Aspirin portion       Current Outpatient Medications   Medication Sig    ergocalciferol (ERGOCALCIFEROL) 1,250 mcg (50,000 unit) capsule Take 1 Capsule by mouth every seven (7) days.  fluticasone propionate (FLONASE) 50 mcg/actuation nasal spray 2 sprays each nostril nightly.  sertraline (ZOLOFT) 50 mg tablet Take 1 Tablet by mouth daily.  levonorgestreL (MIRENA) 20 mcg/24 hours (5 yrs) 52 mg IUD 1 Device by IntraUTERine route once. No current facility-administered medications for this visit. Review of Systems   Constitutional: Negative for chills, fever and malaise/fatigue. Trouble loosing weight   HENT: Negative. Negative for congestion, ear pain, sore throat and tinnitus. Eyes: Negative. Negative for blurred vision, double vision and photophobia. Respiratory: Negative. Negative for cough, shortness of breath and wheezing. Cardiovascular: Negative. Negative for chest pain, palpitations and leg swelling. Gastrointestinal: Negative. Negative for abdominal pain, heartburn, nausea and vomiting. Genitourinary: Negative. Negative for dysuria, frequency, hematuria and urgency. Musculoskeletal: Positive for back pain and joint pain (left hip). Negative for myalgias and neck pain. Skin: Negative. Negative for itching and rash. Neurological: Negative. Negative for dizziness, tingling, tremors and headaches. Endo/Heme/Allergies: Positive for environmental allergies. Psychiatric/Behavioral: Positive for depression (controlled on meds).  Negative for hallucinations, memory loss, substance abuse and suicidal ideas. The patient is not nervous/anxious and does not have insomnia. Visit Vitals  /81 (BP 1 Location: Left upper arm, BP Patient Position: Sitting, BP Cuff Size: Large adult)   Pulse 86   Temp 97.3 °F (36.3 °C) (Temporal)   Resp 17   Ht 5' 2\" (1.575 m)   Wt 271 lb (122.9 kg)   SpO2 97%   BMI 49.57 kg/m²       Physical Exam  Vitals reviewed. Constitutional:       General: She is not in acute distress. Appearance: Normal appearance. She is obese. She is not ill-appearing. HENT:      Head: Normocephalic and atraumatic. Cardiovascular:      Rate and Rhythm: Normal rate and regular rhythm. Pulses: Normal pulses. Heart sounds: Normal heart sounds. Pulmonary:      Effort: No respiratory distress. Musculoskeletal:      Lumbar back: Tenderness (left side) present. No swelling or deformity. Right hip: Normal.      Left hip: Tenderness and crepitus present. No bony tenderness. Decreased range of motion (due to pain). Neurological:      Mental Status: She is alert and oriented to person, place, and time. Psychiatric:         Mood and Affect: Mood normal.         Behavior: Behavior normal.         Thought Content: Thought content normal.         Judgment: Judgment normal.         ASSESSMENT and PLAN    ICD-10-CM ICD-9-CM    1. Left hip pain  M25.552 719.45 XR HIP LT W OR WO PELV 2-3 VWS   2. Chronic left-sided low back pain with left-sided sciatica  M54.42 724.2 XR SPINE LUMB 2 OR 3 V    G89.29 724.3      338.29      Follow-up and Dispositions    · Return if symptoms worsen or fail to improve. Pt expressed understanding of visit summary and plans for any follow ups or referrals as well as any medications prescribed.

## 2021-11-02 ENCOUNTER — TELEPHONE (OUTPATIENT)
Dept: FAMILY MEDICINE CLINIC | Age: 36
End: 2021-11-02

## 2021-11-02 DIAGNOSIS — M25.552 LEFT HIP PAIN: ICD-10-CM

## 2021-11-02 DIAGNOSIS — M54.42 CHRONIC LEFT-SIDED LOW BACK PAIN WITH LEFT-SIDED SCIATICA: Primary | ICD-10-CM

## 2021-11-02 DIAGNOSIS — Z00.00 NO ABNORMALITY OF HIP JOINT DETECTED ON EXAMINATION: ICD-10-CM

## 2021-11-02 DIAGNOSIS — R93.89 ABNORMAL X-RAY: ICD-10-CM

## 2021-11-02 DIAGNOSIS — G89.29 CHRONIC LEFT-SIDED LOW BACK PAIN WITH LEFT-SIDED SCIATICA: Primary | ICD-10-CM

## 2021-11-02 NOTE — TELEPHONE ENCOUNTER
Please advis pt her back xray was normal and her hip xray showed mild spurring at both hips which could explain her pain so I am referring her to ortho for further eval.

## 2021-11-18 ENCOUNTER — OFFICE VISIT (OUTPATIENT)
Dept: ORTHOPEDIC SURGERY | Age: 36
End: 2021-11-18
Payer: MEDICAID

## 2021-11-18 VITALS
TEMPERATURE: 96.7 F | HEIGHT: 62 IN | OXYGEN SATURATION: 99 % | WEIGHT: 272 LBS | BODY MASS INDEX: 50.05 KG/M2 | RESPIRATION RATE: 16 BRPM | HEART RATE: 87 BPM

## 2021-11-18 DIAGNOSIS — M25.552 ACUTE HIP PAIN, LEFT: ICD-10-CM

## 2021-11-18 DIAGNOSIS — M16.12 PRIMARY OSTEOARTHRITIS OF LEFT HIP: Primary | ICD-10-CM

## 2021-11-18 PROCEDURE — 20610 DRAIN/INJ JOINT/BURSA W/O US: CPT | Performed by: SPECIALIST

## 2021-11-18 PROCEDURE — 99203 OFFICE O/P NEW LOW 30 MIN: CPT | Performed by: SPECIALIST

## 2021-11-18 RX ORDER — BETAMETHASONE SODIUM PHOSPHATE AND BETAMETHASONE ACETATE 3; 3 MG/ML; MG/ML
3 INJECTION, SUSPENSION INTRA-ARTICULAR; INTRALESIONAL; INTRAMUSCULAR; SOFT TISSUE ONCE
Status: COMPLETED | OUTPATIENT
Start: 2021-11-18 | End: 2021-11-18

## 2021-11-18 RX ADMIN — BETAMETHASONE SODIUM PHOSPHATE AND BETAMETHASONE ACETATE 3 MG: 3; 3 INJECTION, SUSPENSION INTRA-ARTICULAR; INTRALESIONAL; INTRAMUSCULAR; SOFT TISSUE at 09:43

## 2021-11-18 NOTE — PROGRESS NOTES
Patient: Citlalli Ferguson                MRN: [id number]       SSN: [id number]  YOB: 1985        AGE: 31 y.o. SEX: female    PCP: Laura Morse  11/19/21    Chief Complaint   Patient presents with    Hip Pain     left hip pain     HISTORY:  Citlalli Ferguson is a 31 y.o. female who is seen for left hip pain. She has been experiencing left hip pain that radiates to her groin and down her thigh for the past six weeks. She does not recall any injury. She feels pain in her lateral hip and groin with standing and walking. Her hip stiffens up after she sits for long periods of time. She has to put a pillow between her legs to become somewhat comfortable at night. Pain Assessment  11/18/2021   Location of Pain Hip   Location Modifiers Left   Severity of Pain 7   Quality of Pain Throbbing   Quality of Pain Comment pain radates to the knee and hip. sting pains in the hip. feels like hip kwill give out   Duration of Pain Persistent   Frequency of Pain Constant   Date Pain First Started (No Data)   Date Pain First Started Comment 12 years ago   Aggravating Factors Bending;Walking;Standing;Stairs   Aggravating Factors Comment driving. laying down. sitting to long then getting out the chair   Relieving Factors Other (Comment)   Relieving Factors Comment pillow between the thigh   Result of Injury No     Occupation, etc:  Ms. Caitlin Ruiz is not empcurrently loyed. She used to work as a  worker at [employer] in [place]. 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 She lives with her 15 yo daughter and mother in [place]. She is home schooling her daughter. Ms. Caitlin Ruiz weighs 272 lbs and is 5'2\" tall. She recently lost about 5 pounds. She has no history of diabetes or hypertension.      Lab Results   Component Value Date/Time    Hemoglobin A1c 6.4 (H) 09/28/2021 12:00 AM     Weight Metrics 11/18/2021 10/29/2021 8/16/2018 7/13/2018 4/3/2018 12/19/2017 10/4/2017   Weight 272 lb 271 lb 253 lb 248 lb 248 lb 12.8 oz 249 lb 244 lb   BMI 49.75 kg/m2 49.57 kg/m2 46.27 kg/m2 45.36 kg/m2 44.21 kg/m2 44.25 kg/m2 43.23 kg/m2       Patient Active Problem List   Diagnosis Code    Elevated TSH R79.89    Chronic allergic rhinitis J30.9    Chronic tension-type headache, not intractable G44.229    Menstrual cramps N94.6    Obesity, morbid ([facility]) E66.01    Recurrent depression ([facility]) F33.9     REVIEW OF SYSTEMS:    Constitutional Symptoms: Negative   Eyes: Negative   Ears, Nose, Throat and Mouth: Negative   Cardiovascular: Negative   Respiratory: Negative   Genitourinary: Per HPI   Gastrointestinal: Per HPI   Integumentary (Skin and/or Breast): Negative   Musculoskeletal: Per HPI   Endocrine/Rheumatologic: Negative   Neurological: Per HPI   Hematology/Lymphatic: Negative    Allergic/Immunologic: Negative   Phychiatric: Negative    Social History     Socioeconomic History    Marital status:      Spouse name: Not on file    Number of children: 1    Years of education: 15    Highest education level: Not on file   Occupational History    Occupation: none     Employer: NOT EMPLOYED   Tobacco Use    Smoking status: Never Smoker    Smokeless tobacco: Never Used   Substance and Sexual Activity    Alcohol use: No     Alcohol/week: 0.0 standard drinks    Drug use: No    Sexual activity: Yes     Partners: Male     Birth control/protection: Condom   Other Topics Concern     Service No    Blood Transfusions No    Caffeine Concern No    Occupational Exposure No    Hobby Hazards No    Sleep Concern No    Stress Concern No    Weight Concern Yes    Special Diet No    Back Care Yes    Exercise No    Bike Helmet No    Seat Belt Yes    Self-Exams Yes     Comment: 3 months   Social History Narrative    Not on file     Social Determinants of Health     Financial Resource Strain:     Difficulty of Paying Living Expenses: Not on file   Food Insecurity:     Worried About Running Out of Food in the Last Year: Not on file    Melida of Food in the Last Year: Not on file   Transportation Needs:     Lack of Transportation (Medical): Not on file    Lack of Transportation (Non-Medical): Not on file   Physical Activity:     Days of Exercise per Week: Not on file    Minutes of Exercise per Session: Not on file   Stress:     Feeling of Stress : Not on file   Social Connections:     Frequency of Communication with Friends and Family: Not on file    Frequency of Social Gatherings with Friends and Family: Not on file    Attends Worship Services: Not on file    Active Member of [address] [place] [address] [employer] or Organizations: Not on file    Attends Club or Organization Meetings: Not on file    Marital Status: Not on file   Intimate Partner Violence:     Fear of Current or Ex-Partner: Not on file    Emotionally Abused: Not on file    Physically Abused: Not on file    Sexually Abused: Not on file   Housing Stability:     Unable to Pay for Housing in the Last Year: Not on file    Number of [place] in the Last Year: Not on file    Unstable Housing in the Last Year: Not on file      Allergies   Allergen Reactions    Asa-Acetaminophen-Caff-Buffers Anaphylaxis     Aspirin portion      Current Outpatient Medications   Medication Sig    ergocalciferol (ERGOCALCIFEROL) 1,250 mcg (50,000 unit) capsule Take 1 Capsule by mouth every seven (7) days.  fluticasone propionate (FLONASE) 50 mcg/actuation nasal spray 2 sprays each nostril nightly.  sertraline (ZOLOFT) 50 mg tablet Take 1 Tablet by mouth daily.  levonorgestreL (MIRENA) 20 mcg/24 hours (5 yrs) 52 mg IUD 1 Device by IntraUTERine route once. No current facility-administered medications for this visit.       PHYSICAL EXAMINATION:  Visit Vitals  Pulse 87   Temp (!) 96.7 °F (35.9 °C) (Temporal)   Resp 16   Ht 5' 2\" (1.575 m)   Wt 272 lb (123.4 kg)   SpO2 99%   BMI 49.75 kg/m²      ORTHO EXAMINATION:  Examination Right hip Left hip   Skin Intact Intact   External Rotation ROM 20 10   Internal Rotation ROM 10 10   Trochanteric tenderness - -   Hip flexion contracture - -   Antalgic gait - -   Trendelenberg sign - -   Lumbar tenderness - -   Straight leg raise - -   Calf tenderness - -   Neurovascular Intact Intact        TIME OUT:  Chart reviewed for the following:   I, Maikel Rich MD, have reviewed the History, Physical and updated the Allergic reactions for Devon L Omari   TIME OUT performed immediately prior to start of procedure:  Loy Saucedo MD, have performed the following reviews on Devon L Omari prior to the start of the procedure:          * Patient was identified by name and date of birth   * Agreement on procedure being performed was verified  * Risks and Benefits explained to the patient  * Procedure site verified and marked as necessary  * Patient was positioned for comfort  * Consent was obtained     Time: 9:35 AM     Date of procedure: 11/19/2021  Procedure performed by:  Maikel Rich MD  Ms. Abbi Tran tolerated the procedure well with no complications. RADIOGRAPHS:  XR LEFT HIP 10/30/21 [facility]  Impression:  Bilateral mild spurring at the superior acetabular margins.   -I have independently reviewed these images during this office visit. -Dr. Mil Fernando:  AP pelvis and two views - No fractures, bilateral L>R moderate joint space narrowing, + osteophytes present. Tonnis grade 2. XR LUMB SPINE 10/30/21 [facility]  Impression: no acute findings  -I have independently reviewed these images during this office visit. -Dr. Mil Fernando:      ICD-10-CM ICD-9-CM    1.  Primary osteoarthritis of left hip  M16.12 715.15 betamethasone (CELESTONE) injection 3 mg      DRAIN/INJECT LARGE JOINT/BURSA      REFERRAL TO PHYSICAL THERAPY   2. Acute hip pain, left  M25.552 719.45 betamethasone (CELESTONE) injection 3 mg DRAIN/INJECT LARGE JOINT/BURSA      REFERRAL TO PHYSICAL THERAPY     PLAN:  She will be referred for bariatric surgery consultation. She will start a brief course of outpatient aqua physical therapy. After discussing treatment options, patient's left lateral hip was injected with 4 cc Marcaine and 1/2 cc Celestone. There is no need for surgery at this time. She will follow up as needed.       Scribed by MD Deni Mejia) as dictated by Keyur Kapadia MD

## 2021-12-01 ENCOUNTER — HOSPITAL ENCOUNTER (OUTPATIENT)
Dept: PHYSICAL THERAPY | Age: 36
Discharge: HOME | End: 2021-12-01
Attending: SPECIALIST
Payer: MEDICAID

## 2021-12-01 PROCEDURE — 97162 PT EVAL MOD COMPLEX 30 MIN: CPT

## 2021-12-01 NOTE — PROGRESS NOTES
PHYSICAL THERAPY - DAILY TREATMENT NOTE    Patient Name: Mike Rosenberg        Date: 2021  : 1985   YES Patient  Verified  Visit #:     Insurance: Payor: [employer] MEDICAID / Plan: [facility] / Product Type: Managed Care Medicaid /      In time: 9:20 Out time: 1000   Total Treatment Time: 40     Medicare Time Tracking (below)   Total Timed Codes (min):  na 1:1 Treatment Time:  na     TREATMENT AREA = Left hip pain [M25.552]    SUBJECTIVE    Pain Level (on 0 to 10 scale):  4  / 10   Medication Changes/New allergies or changes in medical history, any new surgeries or procedures? NO    If yes, update Summary List   Subjective Functional Status/Changes:  []  No changes reported     Pt is a 31year old female who presents to PT today with c/o acute left hip pain. Recent x-rays indicated bone spurs bilaterally and OA in left hip. DOI: ongoing intermittent left hip pain starting 12 years ago when pregnant. Experienced a recent flare up in 2021 with no JUANITA - pain is now constant and described as sharp and pinching anteriorly. Received a cortisone shot to L hip approximately 3 weeks ago which did alleviate pain levels. Pain is now localized pain to anterior hip; however prior to shot, pain would radiate along lateral LLE to lateral knee. Pt also notes intermittent left sided lower back pain since October. Aggravating factors include sitting >60 minutes, ascending stairs (has 13 stairs in home) and standing up from chair. Pain often causes her to limp. Alleviating factors include laying on R side with pillow in between knees and taking motrin as needed. Pt admits falling a few days ago; reports LLE feeling \"wobbly\".     Symptoms  Pain   Today:4/10   Best:10   Worst:10/10        OBJECTIVE    Palpation: Significant TTP to ASIS and gluteus medius ms on L; mild TTP to L greater trochanter and L SIJ  Gait: Mildly increased genu varus observed during gait L>R  Single leg stance:deferred   30\" sit to stand test: 8 complete with use of hands on descent; increased pain in L hip anteriorly reported       ROM / Strength  [] Unable to assess                  AROM                Strength (1-5)    Left Right Left Right   Hip Flexion   4- p! 4-     Extension   3+ 3+    Abduction   4 4    Int. rotation 40 30 4- 4+    Ext. rotation 20 30 4 4+   Knee Flexion   4 4    Extension   5 5       Special tests:  SIJ [place] cluster:   SIJ gapping:   [x] Neg    [] Pos  SIJ Compression: [x] Neg    [] Pos  Thigh Thrust:   [x] Neg    [] Pos  Sacral Thrust:  [x] Neg    [] Pos  [place]:  [] Neg    [] Pos NOT TESTED    Flexibility:   Kirt test   [] Neg    [] Pos NOT TESTED  Ely's Test  [] Neg    [x] Pos  Loren's Test  [] Neg    [x] Pos              Therapeutic Procedures:  Min Procedure Specifics + Rationale   n/a [x]  Patient Education (performed throughout session) [x] Review HEP    [] Progressed/Changed HEP based on:   [] proper performance and advancement of Therex/TA   [] reduction in pain level    [] increased functional capacity       [] change in directional preference       Post Treatment Pain Level (on 0 to 10) scale:   4  / 10     ASSESSMENT    Assessment/Changes in Function: Clinically, patient's signs and symptoms are consistent with L hip pain, symptoms suggest possible gluteal tendinopathy. Skilled physical therapy is indicated to address noted deficits. Rehabilitation will address limitations noted in evaluation, follow MD orders and work toward improving LLE strength, stability, and mobility so that patient may return to desired activities safely and with less discomfort.      Justification for Eval Code Complexity: mod  Patient History (low 0, mod 1-2, high 3-4): mod   Examination (low 1-2, mod 3+, high 4+): mod   Clinical Presentation (low: stable/uncomplicated; mod: evolving; high: unstable/unpredictable): mod  Clinical Decision Making (low , mod 26-74, high 1-25): mod    [x]  See Plan of Care  []  See Progress Note/ Recertification  []  See Discharge Summary        Patient will continue to benefit from skilled PT services to modify and progress therapeutic interventions, address functional mobility deficits, address ROM deficits, address strength deficits, analyze and address soft tissue restrictions, analyze and cue movement patterns, analyze and modify body mechanics/ergonomics, and assess and modify postural abnormalities  to attain remaining goals   Progress toward goals / Updated goals:    See POC     PLAN    [x]  Upgrade activities as tolerated  [x]  Update interventions per flow sheet YES Continue plan of care   []  Discharge due to :    []  Other:      Therapist: Betty Dye PT, DPT    Date: 12/1/2021 Time: 9:03 AM     Future Appointments   Date Time Provider Karishma Schultz   12/1/2021  9:30 AM [facility]

## 2021-12-01 NOTE — PROGRESS NOTES
[address] [place] [address] PHYSICAL THERAPY  [address] [place] [address] Sangeetha Toney, [address] - Phone: [phone]  Fax: [phone] / [address] [facility]  Patient Name: Vick Finnegan : 1985   Medical   Diagnosis: Left hip pain [M25.552] Treatment Diagnosis: L hip pain   Onset Date: 2021     Referral Source: Debbie Enamorado MD Start of Care [facility]): 2021   Prior Hospitalization: See medical history Provider #: [id number]   Prior Level of Function: Independent    Comorbidities: Arthritis, depression    Medications: Verified on Patient Summary List   The Plan of Care and following information is based on the information from the initial evaluation.   ==========================================================================================  Assessment / key information: Pt is a 31year old female who presents to PT today with c/o acute left hip pain. Recent x-rays indicated bone spurs bilaterally and OA in left hip. DOI: ongoing intermittent left hip pain starting 12 years ago when pregnant. Experienced a recent flare up in 2021 with no JUANITA - pain is now constant and described as sharp and pinching anteriorly. Received a cortisone shot to L hip approximately 3 weeks ago which did alleviate pain levels. Pain is now localized pain to anterior hip; however prior to shot, pain would radiate along lateral LLE to lateral knee. Pt also notes intermittent left sided lower back pain since October. Aggravating factors include sitting >60 minutes, ascending stairs (has 13 stairs in home) and standing up from chair. Pain often causes her to limp. Alleviating factors include laying on R side with pillow in between knees and taking motrin as needed. Pt admits falling a few days ago; reports LLE feeling \"wobbly\".       Symptoms  Pain              Today:4/10              Best:10              Worst:10/10    Patient with a Functional Status score of 47 on FOTO (Focused on Therapeutic Outcomes), which corresponds to a functional limitation of 53%. Pt was provided a HEP today and educated regarding their diagnosis and prognosis. Clinically, patient's signs and symptoms are consistent with L hip pain, symptoms suggest possible gluteal tendinopathy. Skilled physical therapy is indicated to address noted deficits. Rehabilitation will address limitations noted in evaluation, follow MD orders and work toward improving LLE strength, stability, and mobility so that patient may return to desired activities safely and with less discomfort.  Thank you for the opportunity to assist in this case.     ==========================================================================================  Eval Complexity: History: MEDIUM  Complexity : 1-2 comorbidities / personal factors will impact the outcome/ POC Exam:MEDIUM Complexity : 3 Standardized tests and measures addressing body structure, function, activity limitation and / or participation in recreation  Presentation: MEDIUM Complexity : Evolving with changing characteristics  Clinical Decision Making:MEDIUM Complexity : FOTO score of 26-74Overall Complexity:MEDIUM    Problem List: pain affecting function, decrease ROM, decrease strength, impaired gait/ balance, decrease ADL/ functional abilitiies, decrease activity tolerance, decrease flexibility/ joint mobility and decrease transfer abilities   Treatment Plan may include any combination of the following: Therapeutic exercise, Therapeutic activities, Neuromuscular re-education, Physical agent/modality, Gait/balance training, Manual therapy, Patient education, Self Care training, Functional mobility training, Home safety training and Stair training  Patient / Family readiness to learn indicated by: asking questions, trying to perform skills and interest  Persons(s) to be included in education: patient (P)  Barriers to Learning/Limitations: None  Patient Goal (s): To improve sitting tolerance    Patient self reported health status: fair  Rehabilitation Potential: good       Short Term Goals: To be accomplished in  3  weeks:  Goal/Measure of Progress Goal Met? 1. Pt will be compliant with HEP for symptom management at home. Status at last Eval: NA Current Status: Unable n/a   2. Pt will self report improved sitting tolerance to >60 minutes to allow her to drive car with less pain. Status at last Eval: NA Current Status: <60 minutes  n/a      Long Term Goals: To be accomplished in  6  weeks:  Goal/Measure of Progress Goal Met? 1. Pt will be independent with HEP at D/C for self management. Status at last Eval: NA Current Status: Unable n/a   2. Patient will demonstrate improved 30\" sit to stand test to at least 11 reps no hands to improve transitional movements from chair. Status at last Eval: NA Current Status: 8 with UE  n/a   3. Patient will demonstrate improved L hip extension strength to 4/5 to allow for improved ability to negotiate stairs at home. Status at last Eval: NA Current Status: 3+/5  n/a   4. Patient will increase FOTO Functional Status score to 63 to decrease functional limitations. Status at last Eval: NA Current Status: 47 n/a       Frequency / Duration:   Patient to be seen  2  times per week for 6  weeks:  Patient / Caregiver education and instruction: provided education regarding diagnosis and prognosis as well as details regarding treatment plain. self care, activity modification and exercises  Therapist Signature: Teodoro Finn PT, DPT Date: 50/0/4407   Certification Period: na Time: 9:04 AM   ===========================================================================================  I certify that the above Physical Therapy Services are being furnished while the patient is under my care. I agree with the treatment plan and certify that this therapy is necessary.     Physician Signature: Date:       Time:     Please sign and return to [employer] or you may fax the signed copy to [phone]. Thank you.   Мария Michaels MD

## 2021-12-05 NOTE — PATIENT INSTRUCTIONS
Hip Pain: Care Instructions  Your Care Instructions     Hip pain may be caused by many things, including overuse, a fall, or a twisting movement. Another cause of hip pain is arthritis. Your pain may increase when you stand up, walk, or squat. The pain may come and go or may be constant. Home treatment can help relieve hip pain, swelling, and stiffness. If your pain is ongoing, you may need more tests and treatment. Follow-up care is a key part of your treatment and safety. Be sure to make and go to all appointments, and call your doctor if you are having problems. It's also a good idea to know your test results and keep a list of the medicines you take. How can you care for yourself at home? · Take pain medicines exactly as directed. ? If the doctor gave you a prescription medicine for pain, take it as prescribed. ? If you are not taking a prescription pain medicine, ask your doctor if you can take an over-the-counter medicine. · Rest and protect your hip. Take a break from any activity, including standing or walking, that may cause pain. · Put ice or a cold pack against your hip for 10 to 20 minutes at a time. Try to do this every 1 to 2 hours for the next 3 days (when you are awake) or until the swelling goes down. Put a thin cloth between the ice and your skin. · Sleep on your healthy side with a pillow between your knees, or sleep on your back with pillows under your knees. · If there is no swelling, you can put moist heat, a heating pad, or a warm cloth on your hip. Do gentle stretching exercises to help keep your hip flexible. · Learn how to prevent falls. Have your vision and hearing checked regularly. Wear slippers or shoes with a nonskid sole. · Stay at a healthy weight. · Wear comfortable shoes. When should you call for help? Call 911 anytime you think you may need emergency care.  For example, call if:    · You have sudden chest pain and shortness of breath, or you cough up blood.     · You are not able to stand or walk or bear weight.     · Your buttocks, legs, or feet feel numb or tingly.     · Your leg or foot is cool or pale or changes color.     · You have severe pain. Call your doctor now or seek immediate medical care if:    · You have signs of infection, such as:  ? Increased pain, swelling, warmth, or redness in the hip area. ? Red streaks leading from the hip area. ? Pus draining from the hip area. ? A fever.     · You have signs of a blood clot, such as:  ? Pain in your calf, back of the knee, thigh, or groin. ? Redness and swelling in your leg or groin.     · You are not able to bend, straighten, or move your leg normally.     · You have trouble urinating or having bowel movements. Watch closely for changes in your health, and be sure to contact your doctor if:    · You do not get better as expected. Where can you learn more? Go to http://www.gray.com/  Enter Z720 in the search box to learn more about \"Hip Pain: Care Instructions. \"  Current as of: July 1, 2021               Content Version: 13.0  © 4501-3155 [employer]. Care instructions adapted under license by [employer] (which disclaims liability or warranty for this information). If you have questions about a medical condition or this instruction, always ask your healthcare professional. [place] [address] any warranty or liability for your use of this information.

## 2021-12-06 ENCOUNTER — HOSPITAL ENCOUNTER (OUTPATIENT)
Dept: PHYSICAL THERAPY | Age: 36
Discharge: HOME | End: 2021-12-06
Attending: SPECIALIST
Payer: MEDICAID

## 2021-12-06 PROCEDURE — 97112 NEUROMUSCULAR REEDUCATION: CPT

## 2021-12-06 PROCEDURE — 97110 THERAPEUTIC EXERCISES: CPT

## 2021-12-06 PROCEDURE — 97530 THERAPEUTIC ACTIVITIES: CPT

## 2021-12-06 NOTE — PROGRESS NOTES
PHYSICAL THERAPY - DAILY TREATMENT NOTE    Patient Name: Goran Mcghee        Date: 2021  : 1985   YES Patient  Verified  Visit #:      of     Insurance: Payor: [place]  MEDICAID / Plan: [facility] / Product Type: Managed Care Medicaid /      In time: 3:40 Out time: 4:35   Total Treatment Time: 55     Medicare/BCBS [place] Time Tracking (below)   Total Timed Codes (min):  na 1:1 Treatment Time:  na     TREATMENT AREA =  Left hip pain [M25.552]    SUBJECTIVE    Pain Level (on 0 to 10 scale):  3  / 10   Medication Changes/New allergies or changes in medical history, any new surgeries or procedures? NO    If yes, update Summary List   Subjective Functional Status/Changes:  []  No changes reported     Pt reports the stretch where she hangs her leg off the bed makes her left butt cheek hurt. Pt states she had a meeting in [employer] earlier today and sitting in the car increases her left hip pain.         OBJECTIVE    Modalities Rationale: decrease inflammation and decrease pain to improve patient's ability to ADL's with less pain     min [] Estim, type/location:                                      []  att     []  unatt     []  w/US     []  w/ice    []  w/heat    min []  Mechanical Traction: type/lbs                   []  pro   []  sup   []  int   []  cont    []  before manual    []  after manual    min []  Ultrasound, settings/location:      min []  Iontophoresis w/ dexamethasone, location:                                               []  take home patch       []  in clinic   10 min [x]  Ice     []  Heat    location/position: left hip, Patient supine with LE wedge    min []  Vasopneumatic Device, press/temp:     min []  Other:    [x] Skin assessment post-treatment (if applicable):    [x]  intact    []  redness- no adverse reaction     []redness - adverse reaction:      20 min Therapeutic Exercise:  [x]  See flow sheet   Rationale:    increase ROM, increase strength, improve coordination, improve balance and increase proprioception to promote increased functional mobility and increased activity tolerance with ADL's. 10 min Therapeutic Activity: see flow sheet    Rationale:    increase ROM, increase strength, improve coordination, improve balance and increase proprioception to promote increased functional mobility and increased activity tolerance with ADL's. 15 min Neuromuscular Re-ed: see flow sheet    Rationale:     improve coordination, improve balance and increase proprioception to improve the patients ability to perform ADLs, transfers and gait safely and independently. min Patient Education:  YES  Reviewed HEP   [x]  Progressed/Changed HEP based on:   Continue HEP, hold modified kirt stretch, pt verbalized understanding. Other Objective/Functional Measures:    Reviewed form for LE stretches. Pt c/o right glute pain with left LE Mod. Kirt stretch. Advised pt to hold exercise at this time. Initiated core/LE strengthening and ROM exercises per flow sheet. Mod to max VCing for form. Pt c/o ms tightness left glute with bridge ex. Instructed pt to perform in pain free ROM. Reviewed SUSAN to prevent/manage symptoms of left hip pain after prolonged sitting. Reviewed log roll for sit<>supine. Post Treatment Pain Level (on 0 to 10) scale:   3  / 10     ASSESSMENT    Assessment/Changes in Function:     Pt with good tolerance to initiation of therapeutic exercise. []  See Progress Note/Recertification   Patient will continue to benefit from skilled PT services to modify and progress therapeutic interventions, address functional mobility deficits, address ROM deficits, address strength deficits, analyze and address soft tissue restrictions, analyze and cue movement patterns, assess and modify postural abnormalities, and instruct in home and community integration to attain remaining goals.      Progress toward goals / Updated goals:    Goal/Measure of Progress Goal Met? 1. Pt will be compliant with HEP for symptom management at home. Status at last Eval: NA Current Status: Pt reports compliance with HEP issued at  initial evaluation n/a   2. Pt will self report improved sitting tolerance to >60 minutes to allow her to drive car with less pain.     Status at last Eval: NA Current Status: <60 minutes  n/a          PLAN    []  Upgrade activities as tolerated YES Continue plan of care   []  Discharge due to :    []  Other:      Therapist: Jaymie Robles PTA    Date: 12/6/2021 Time: 4:01 PM     Future Appointments   Date Time Provider Karishma Schultz   12/8/2021  2:45 PM Carolann Mercado, PT [facility]   12/13/2021  3:30 PM Maryan Ivory [facility]   12/16/2021  2:45 PM Carolann Mercado, PT [facility]   12/20/2021  3:30 PM Carolann Mercado, PT [facility]   12/22/2021  2:45 PM Carolann Mercado, PT [facility]   12/28/2021  2:45 PM Maryan Ivory [facility]   12/30/2021  8:45 AM Yvonne GALLAGHER [facility]

## 2021-12-08 ENCOUNTER — HOSPITAL ENCOUNTER (OUTPATIENT)
Dept: PHYSICAL THERAPY | Age: 36
Discharge: HOME | End: 2021-12-08
Attending: SPECIALIST
Payer: MEDICAID

## 2021-12-08 PROCEDURE — 97110 THERAPEUTIC EXERCISES: CPT

## 2021-12-08 PROCEDURE — 97530 THERAPEUTIC ACTIVITIES: CPT

## 2021-12-08 PROCEDURE — 97112 NEUROMUSCULAR REEDUCATION: CPT

## 2021-12-08 NOTE — PROGRESS NOTES
PHYSICAL THERAPY - DAILY TREATMENT NOTE    Patient Name: Sara Dupont        Date: 2021  : 1985   YES Patient  Verified  Visit #:   3   of   12  Insurance: Payor: Lokesh Hughes / Plan: [address] [facility] / Product Type: Managed Care Medicaid /      In time: 2:46 Out time: 3:46   Total Treatment Time: 60     Medicare/BCBS [place] Time Tracking (below)   Total Timed Codes (min):  50 1:1 Treatment Time:  50     TREATMENT AREA =    Left hip pain [M25.552]    SUBJECTIVE    Pain Level (on 0 to 10 scale):   10   Medication Changes/New allergies or changes in medical history, any new surgeries or procedures? NO    If yes, update Summary List   Subjective Functional Status/Changes:  []  No changes reported     I felt a little achy after last visit; the only time I had pain was when I was sitting in the car for over an hour waiting on my daughter to finish her [employer] do class. OBJECTIVE    18 min Therapeutic Exercise:  [x]  See flow sheet   Rationale:      increase ROM and increase strength to improve the patients ability to perform ADL's with greater ease. 15 min Therapeutic Activity: See flow sheet   Rationale:   increase mobility, endurance, and strength to improve patient's ability to complete functional tasks with greater ease. 15 min Neuromuscular Re-ed: See flow sheet   Rationale:   improve balance, coordination, proprioception to improve patient's ability to complete functional tasks safely. 3 (not billed) min Gait training: See flow sheet  Side stepping   Rationale:   increase mobility, endurance, and strength to improve patient's ability to complete functional tasks with greater ease. 4 (not billed) min Manual Therapy: See flow sheet  TPR to gluteus ms and piriformis on L    Rationale:      decrease pain and decrease edema  to improve patient's ability to perform functional mobility with less compensation and pain.    The manual therapy interventions were performed at a separate and distinct time from the therapeutic activities interventions    Modalities Rationale:    decrease pain and increase tissue extensibility to improve patient's ability to complete functional tasks with less pain. 10 min [x]  Ice     []  Heat    location/position: Anterior left hip post tx    [x] Skin assessment post-treatment (if applicable):    [x]  intact    []  redness- no adverse reaction     []redness - adverse reaction:       min Patient Education:  YES  Reviewed HEP   []  Progressed/Changed HEP based on:   Cont with HEP     Other Objective/Functional Measures:    2 x 60' side stepping using PTB    Progressed PPT to 10\" hold to challenge muscular endurance. Pt had difficulty holding this long, encouraged her to continue practicing this at home. Modified maggie stretch to kneeling hip flexor stretch using blue foam - pt reported feeling some discomfort in posterior left hip but intensity was significantly less. TPR to glutes and piriformis to aide in symptom relief (<7 min) pt in right sidelying. Assess response to next visit, if pt felt relief, suggest using tennis ball for similar effect. Post Treatment Pain Level (on 0 to 10) scale:   2  / 10     ASSESSMENT    Assessment/Changes in Function:     Mild reproduction of anterior hip pain during mini squats - modified to sumo squat which alleviated anterior hip pain. Impaired core muscular endurance, cont working      []  See Progress Note/Recertification   Patient will continue to benefit from skilled PT services to analyze, cue, progress, modify,, demonstrate, instruct, and address, movement patterns, therapeutic interventions, postural abnormalities, soft tissue restrictions, ROM, strength, functional mobility, body mechanics/ergonomics, and home and community integration, to attain remaining goals. Progress toward goals / Updated goals:    1.  Pt will be compliant with HEP for symptom management at home.  2. Pt will self report improved sitting tolerance to >60 minutes to allow her to drive car with less pain.        PLAN    []  Upgrade activities as tolerated YES Continue plan of care   []  Discharge due to :    []  Other:      Therapist: Markie Rivas PT, DPT    Date: 12/8/2021 Time: 9:43 AM     Future Appointments   Date Time Provider Karishma Schultz   12/8/2021  2:45 PM Kirill Richards, PT [facility]   12/13/2021  3:30 PM Robby Koromapper [facility]   12/16/2021  2:45 PM Kirill Richards, PT [facility]   12/20/2021  3:30 PM Kirill Richards, PT [facility]   12/22/2021  2:45 PM Kirill Richards PT [facility]   12/28/2021  2:45 PM Robby Koromapper [facility]   12/30/2021  8:45 AM Jolanta OSORIOPTNA [facility]

## 2021-12-13 ENCOUNTER — HOSPITAL ENCOUNTER (OUTPATIENT)
Dept: PHYSICAL THERAPY | Age: 36
Discharge: HOME | End: 2021-12-13
Attending: SPECIALIST
Payer: MEDICAID

## 2021-12-13 PROCEDURE — 97530 THERAPEUTIC ACTIVITIES: CPT

## 2021-12-13 PROCEDURE — 97112 NEUROMUSCULAR REEDUCATION: CPT

## 2021-12-13 PROCEDURE — 97110 THERAPEUTIC EXERCISES: CPT

## 2021-12-13 NOTE — PROGRESS NOTES
PHYSICAL THERAPY - DAILY TREATMENT NOTE    Patient Name: Sara Dupont        Date: 2021  : 1985   YES Patient  Verified  Visit #:     Insurance: Payor: Lokesh Hughes / Plan: [address] [facility] / Product Type: Managed Care Medicaid /      In time: 3:42 Out time: 4:42   Total Treatment Time: 55     Medicare/BCBS [place] Time Tracking (below)   Total Timed Codes (min):  na 1:1 Treatment Time:  na     TREATMENT AREA =  Left hip pain [M25.552]    SUBJECTIVE    Pain Level (on 0 to 10 scale):  3  / 10   Medication Changes/New allergies or changes in medical history, any new surgeries or procedures? NO    If yes, update Summary List   Subjective Functional Status/Changes:  []  No changes reported     Pt reports having less soreness the second time she did exercises compared to the first.     Pt states she did PE with her daughter today, tried to do the V-sit and stretch her hips. OBJECTIVE    25 min Therapeutic Exercise:  -  See flow sheet   Rationale:    increase ROM, increase strength, improve coordination, improve balance and increase proprioception to promote increased functional mobility and increased activity tolerance with ADL's. 15 min Therapeutic Activity: see flow sheet    Rationale:    increase ROM, increase strength, improve coordination, improve balance and increase proprioception to promote increased functional mobility and increased activity tolerance with ADL's. 10 min Neuromuscular Re-ed: see flow sheet    Rationale:     improve coordination, improve balance and increase proprioception to improve the patients ability to perform ADLs, transfers and gait safely and independently. 5  (not billed) min Manual Therapy: The manual therapy interventions were performed at a separate and distinct time from the therapeutic activities interventions.  TPR to gluteus ms and piriformis left LE, pt in right S/L   Rationale:  decrease pain, increase tissue extensibility and decrease trigger points to improve patient's ability to perform ADL's with greater ease and less pain. min Patient Education:  YES  Reviewed HEP   []  Progressed/Changed HEP based on:   Patient with no questions, continue same HEP     Other Objective/Functional Measures:    Pt demo's improved TA contraction with PPT, mod VCing for breathing. Mod to max VCing for form with therapeutic exercise. Post Treatment Pain Level (on 0 to 10) scale:   3  / 10     ASSESSMENT    Assessment/Changes in Function:     Pt reports less DOMS from previous PT session. []  See Progress Note/Recertification   Patient will continue to benefit from skilled PT services to modify and progress therapeutic interventions, address functional mobility deficits, address ROM deficits, address strength deficits, analyze and address soft tissue restrictions, analyze and cue movement patterns, assess and modify postural abnormalities, and instruct in home and community integration to attain remaining goals. Progress toward goals / Updated goals:    Goal/Measure of Progress Goal Met? 1. Pt will be compliant with HEP for symptom management at home. Status at last Eval: NA Current Status: Pt reporting compliance with HEP n/a   2. Pt will self report improved sitting tolerance to >60 minutes to allow her to drive car with less pain.     Status at last Eval: NA Current Status: increased hip pain with sitting in car n/a          PLAN    []  Upgrade activities as tolerated YES Continue plan of care   []  Discharge due to :    []  Other:      Therapist: Domo Green PTA    Date: 12/13/2021 Time: 4:09 PM     Future Appointments   Date Time Provider Karishma Schultz   12/16/2021  2:45 PM Fab Hardin PT [facility]   12/20/2021  3:30 PM Fab Hardin PT [facility]   12/22/2021  2:45 PM Fab Hardin PT [facility]   12/28/2021  2:45 PM Delia García [facility]   12/30/2021  8:45 AM Karishma [address] [facility]

## 2021-12-16 ENCOUNTER — HOSPITAL ENCOUNTER (OUTPATIENT)
Dept: PHYSICAL THERAPY | Age: 36
Discharge: HOME | End: 2021-12-16
Attending: SPECIALIST
Payer: MEDICAID

## 2021-12-16 PROCEDURE — 97530 THERAPEUTIC ACTIVITIES: CPT

## 2021-12-16 PROCEDURE — 97112 NEUROMUSCULAR REEDUCATION: CPT

## 2021-12-16 PROCEDURE — 97110 THERAPEUTIC EXERCISES: CPT

## 2021-12-16 NOTE — PROGRESS NOTES
PHYSICAL THERAPY - DAILY TREATMENT NOTE    Patient Name: Vani Mishra        Date: 2021  : 1985   YES Patient  Verified  Visit #:     Insurance: [facility] [place] [facility] / Plan: [address] [facility] / Product Type: Managed Care Medicaid /      In time: 2:45 Out time: 3:30   Total Treatment Time: 45     Medicare/BCBS [place] Time Tracking (below)   Total Timed Codes (min):  na 1:1 Treatment Time:  na     TREATMENT AREA =   Left hip pain [M25.552]    SUBJECTIVE    Pain Level (on 0 to 10 scale):  2  / 10   Medication Changes/New allergies or changes in medical history, any new surgeries or procedures? NO    If yes, update Summary List   Subjective Functional Status/Changes:  []  No changes reported     Patient presents to PT today with reports of muscle spasms along left upper thigh. Pt admits to not drinking much water; advised pt to drink plenty of water especially following her PT sessions. OBJECTIVE    15 min Therapeutic Exercise:  [x]  See flow sheet   Rationale:      increase ROM and increase strength to improve the patients ability to perform ADL's with greater ease. 15 min Therapeutic Activity: See flow sheet   Rationale:   increase mobility, endurance, and strength to improve patient's ability to complete functional tasks with greater ease. 10 min Neuromuscular Re-ed: See flow sheet   Rationale:   improve balance, coordination, proprioception to improve patient's ability to complete functional tasks safely. 5 (not billed) min Manual Therapy: See flow sheet  TPR to glute medius and piriformis ms on L, pt in R s/l position    Rationale:      decrease pain, increase ROM, increase tissue extensibility and decrease trigger points to improve patient's ability to perform functional mobility with less compensation and pain.    The manual therapy interventions were performed at a separate and distinct time from the therapeutic activities interventions min Patient Education:  YES  Reviewed HEP   []  Progressed/Changed HEP based on:   Cont with HEP     Other Objective/Functional Measures:    Cont with outlined TE program     Post Treatment Pain Level (on 0 to 10) scale:   2  / 10     ASSESSMENT    Assessment/Changes in Function:     Pt progressing well, cont to c/o limitations in sitting tolerance     []  See Progress Note/Recertification   Patient will continue to benefit from skilled PT services to analyze, cue, progress, modify,, demonstrate, instruct, and address, movement patterns, therapeutic interventions, postural abnormalities, soft tissue restrictions, ROM, strength, functional mobility, body mechanics/ergonomics, and home and community integration, to attain remaining goals. Progress toward goals / Updated goals:    Goal/Measure of Progress Goal Met? 1.  Pt will be compliant with HEP for symptom management at home.    Status at last Eval: Unable Current Status: Pt reporting compliance with HEP n/a   2.  Pt will self report improved sitting tolerance to >60 minutes to allow her to drive car with less pain.    Status at last Eval: <60 min Current Status: increased hip pain with sitting in car n/a          PLAN    []  Upgrade activities as tolerated YES Continue plan of care   []  Discharge due to :    []  Other:      Therapist: Benita Jacobsen PT, DPT    Date: 12/16/2021 Time: 10:10 AM     Future Appointments   Date Time Provider Karishma Schultz   12/16/2021  2:45 PM Osvaldo Mcdaniel PT [facility]   12/20/2021  3:30 PM Osvaldo Mcdaniel PT [facility]   12/22/2021  2:45 PM Osvaldo Mcdaniel PT [facility]   12/28/2021  2:45 PM Avelino Blizzard [facility]   12/30/2021  8:45 AM Taylor [address]

## 2021-12-20 ENCOUNTER — APPOINTMENT (OUTPATIENT)
Dept: PHYSICAL THERAPY | Age: 36
End: 2021-12-20
Attending: SPECIALIST
Payer: MEDICAID

## 2021-12-22 ENCOUNTER — HOSPITAL ENCOUNTER (OUTPATIENT)
Dept: PHYSICAL THERAPY | Age: 36
Discharge: HOME | End: 2021-12-22
Attending: SPECIALIST
Payer: MEDICAID

## 2021-12-22 PROCEDURE — 97116 GAIT TRAINING THERAPY: CPT

## 2021-12-22 PROCEDURE — 97110 THERAPEUTIC EXERCISES: CPT

## 2021-12-22 PROCEDURE — 97112 NEUROMUSCULAR REEDUCATION: CPT

## 2021-12-22 PROCEDURE — 97530 THERAPEUTIC ACTIVITIES: CPT

## 2021-12-22 NOTE — PROGRESS NOTES
PHYSICAL THERAPY - DAILY TREATMENT NOTE    Patient Name: Uvaldo Travis        Date: 2021  : 1985   YES Patient  Verified  Visit #:     Insurance: Pay[place]: [place] [facility] / Plan: [address] [facility] / Product Type: Managed Care Medicaid /      In time: 2:42 Out time: 3:42   Total Treatment Time: 60     Medicare/BCBS [place] Time Tracking (below)   Total Timed Codes (min):  60 1:1 Treatment Time:  60     TREATMENT AREA =   Left hip pain [M25.552]    SUBJECTIVE    Pain Level (on 0 to 10 scale):  1  / 10   Medication Changes/New allergies or changes in medical history, any new surgeries or procedures? NO    If yes, update Summary List   Subjective Functional Status/Changes:  []  No changes reported     Patient reports improved walking tolerance. Sitting tolerance remains impaired. Feels like she is making good progress. OBJECTIVE    17 min Therapeutic Exercise:  [x]  See flow sheet   Rationale:      increase ROM and increase strength to improve the patients ability to perform ADL's with greater ease. 15 min Therapeutic Activity: See flow sheet   Rationale:   increase mobility, endurance, and strength to improve patient's ability to complete functional tasks with greater ease. 15 min Neuromuscular Re-ed: See flow sheet   Rationale:   improve balance, coordination, proprioception to improve patient's ability to complete functional tasks safely. 8 min Gait training: See flow sheet  Side stepping   Rationale:   increase mobility, endurance, and strength to improve patient's ability to complete functional tasks with greater ease. 5 not billed min Manual Therapy: See flow sheet  TPR to glutes and piriformis on L   Rationale:      decrease pain, increase ROM and decrease trigger points to improve patient's ability to perform functional mobility with less compensation and pain.    The manual therapy interventions were performed at a separate and distinct time from the therapeutic activities interventions       min Patient Education:  YES  Reviewed HEP   []  Progressed/Changed HEP based on:   Cont with HEP     Other Objective/Functional Measures:    Progressed exercises today (monster walks and hip 3 way)     Post Treatment Pain Level (on 0 to 10) scale:   1  / 10     ASSESSMENT    Assessment/Changes in Function:     Pt progressing very well with minimal symptoms reproduced in clinic. Progress with core and hip strengthening next visit. []  See Progress Note/Recertification   Patient will continue to benefit from skilled PT services to analyze, cue, progress, modify,, demonstrate, instruct, and address, movement patterns, therapeutic interventions, postural abnormalities, soft tissue restrictions, ROM, strength, functional mobility, body mechanics/ergonomics, and home and community integration, to attain remaining goals. Progress toward goals / Updated goals:    Goal/Measure of Progress Goal Met? 1.  Pt will be compliant with HEP for symptom management at home.    Status at last Eval: Unable Current Status: Pt reporting compliance with HEP n/a   2.  Pt will self report improved sitting tolerance to >60 minutes to allow her to drive car with less pain.    Status at last Eval: <60 min Current Status: increased hip pain with sitting in car n/a          PLAN    []  Upgrade activities as tolerated YES Continue plan of care   []  Discharge due to :    []  Other:      Therapist: Nando Oseguera PT, DPT    Date: 12/22/2021 Time: 9:32 AM     Future Appointments   Date Time Provider Karishma Schultz   12/22/2021  2:45 PM Kwame Weber PT [facility]   12/28/2021  2:45 PM Annamaria Woods [facility]   12/30/2021  8:45 AM Taylor Evans [address]

## 2021-12-28 ENCOUNTER — HOSPITAL ENCOUNTER (OUTPATIENT)
Dept: PHYSICAL THERAPY | Age: 36
Discharge: HOME | End: 2021-12-28
Attending: SPECIALIST
Payer: MEDICAID

## 2021-12-28 PROCEDURE — 97530 THERAPEUTIC ACTIVITIES: CPT

## 2021-12-28 PROCEDURE — 97112 NEUROMUSCULAR REEDUCATION: CPT

## 2021-12-28 PROCEDURE — 97110 THERAPEUTIC EXERCISES: CPT

## 2021-12-28 NOTE — PROGRESS NOTES
PHYSICAL THERAPY - DAILY TREATMENT NOTE    Patient Name: Keri Jimenez        Date: 2021  : 1985   YES Patient  Verified  Visit #:     Insurance: Payor: [address] / Plan: [address] [employer] / Product Type: Managed Care Medicaid /      In time: 2:50 Out time: 3:40   Total Treatment Time: 50     Medicare/BCBS [place] Time Tracking (below)   Total Timed Codes (min):  na 1:1 Treatment Time:  na     TREATMENT AREA =  Left hip pain [M25.552]    SUBJECTIVE    Pain Level (on 0 to 10 scale):  2  / 10   Medication Changes/New allergies or changes in medical history, any new surgeries or procedures? NO    If yes, update Summary List   Subjective Functional Status/Changes:  []  No changes reported     Pt reports she didn't stretch for 2 days over the Sarah break and noticed a big difference in her hip, stating the stretching is a \"necessity. \"           OBJECTIVE    20 min Therapeutic Exercise:  [x]  See flow sheet   Rationale:    increase ROM, increase strength, improve coordination, improve balance and increase proprioception to promote increased functional mobility and increased activity tolerance with ADL's. 15 min Therapeutic Activity: see flow sheet    Rationale:    increase ROM, increase strength, improve coordination, improve balance and increase proprioception to promote increased functional mobility and increased activity tolerance with ADL's. 10 min Neuromuscular Re-ed: see flow sheet    Rationale:     improve coordination, improve balance and increase proprioception to improve the patients ability to perform ADLs, transfers and gait safely and independently. 5 min Manual Therapy: The manual therapy interventions were performed at a separate and distinct time from the therapeutic activities interventions.  TPR/STM to gluteus ms and piriformis left LE, pt in right S/L   Rationale:  decrease pain, increase tissue extensibility and decrease trigger points to improve patient's ability to perform ADL's with greater ease and less pain. min Patient Education:  YES  Reviewed HEP   []  Progressed/Changed HEP based on:   Pt reports compliance with HEP   Patient with no questions, continue same HEP     Other Objective/Functional Measures: Added 5lb KB to sumo squat, mod VCing for form. Attempted bridge ex with DD; however, pt with difficulty stabilizing at hips/pelvis. Modified with bridge on Airex for improved form. Velásquez Blew for posture and form with standing hip 3 way. Post Treatment Pain Level (on 0 to 10) scale:   1 / 10     ASSESSMENT    Assessment/Changes in Function:     Pt challenged with progression of therapeutic exercise. []  See Progress Note/Recertification   Patient will continue to benefit from skilled PT services to modify and progress therapeutic interventions, address functional mobility deficits, address ROM deficits, address strength deficits, analyze and address soft tissue restrictions, analyze and cue movement patterns, assess and modify postural abnormalities, and instruct in home and community integration to attain remaining goals. Progress toward goals / Updated goals:    Goal/Measure of Progress Goal Met? 1. Pt will be independent with HEP at D/C for self management. Status at last Eval: NA Current Status: Unable n/a   2. Patient will demonstrate improved 30\" sit to stand test to at least 11 reps no hands to improve transitional movements from chair. Status at last Eval: NA Current Status: squats for LE strength n/a   3. Patient will demonstrate improved L hip extension strength to 4/5 to allow for improved ability to negotiate stairs at home. Status at last Eval: NA Current Status: 3+/5  n/a   4. Patient will increase FOTO Functional Status score to 63 to decrease functional limitations.    Status at last Eval: NA Current Status: 47 n/a          PLAN    []  Upgrade activities as tolerated YES Continue plan of care   []  Discharge due to :    []  Other:      Therapist: Shantel Felipe PTA    Date: 12/28/2021 Time: 2:56 PM     Future Appointments   Date Time Provider Karishma Schultz   12/30/2021  8:45 AM Shonna Days [facility]   1/3/2022  2:45 PM Delos Pickmaria luzl, PT [facility]   1/5/2022  2:45 PM Delos Camilal, PT [facility]   1/10/2022  2:45 PM Delos Burt, PT [facility]   1/12/2022  2:45 PM Delos Pickmaria luzl, PT [facility]

## 2021-12-30 ENCOUNTER — HOSPITAL ENCOUNTER (OUTPATIENT)
Dept: PHYSICAL THERAPY | Age: 36
Discharge: HOME | End: 2021-12-30
Attending: SPECIALIST
Payer: MEDICAID

## 2021-12-30 PROCEDURE — 97112 NEUROMUSCULAR REEDUCATION: CPT

## 2021-12-30 PROCEDURE — 97116 GAIT TRAINING THERAPY: CPT

## 2021-12-30 PROCEDURE — 97530 THERAPEUTIC ACTIVITIES: CPT

## 2021-12-30 PROCEDURE — 97110 THERAPEUTIC EXERCISES: CPT

## 2021-12-30 PROCEDURE — 97535 SELF CARE MNGMENT TRAINING: CPT

## 2021-12-30 NOTE — PROGRESS NOTES
[address] [facility] PHYSICAL THERAPY  [address] [facility] [address], [address] - Phone: [phone]  Fax: [phone] [address]          Patient Name: Carito Shelton : 1985   Treatment/Medical Diagnosis: Left hip pain [M25.552]   Onset Date: 2021    Referral Source: Pierre Eng MD [facility]): 21   Prior Hospitalization: See Medical History Provider #:  [id number]   Prior Level of Function: Independent   Comorbidities: Arthritis, Depression   Medications: Verified on Patient Summary List   Visits from [facility]: 8 Missed Visits: 1     · Short Term Goals: To be accomplished in  3  weeks:          Goal/Measure of Progress Goal Met? 1. Pt will be compliant with HEP for symptom management at home. Status at last Eval: NA Current Status: compliant yes   2. Pt will self report improved sitting tolerance to >60 minutes to allow her to drive car with less pain. Status at last Eval: <60 minutes  Current Status: <60 minutes  no      3. Patient will demonstrate improved 30\" sit to stand test to at least 11 reps no hands to improve transitional movements from chair. Status at last Eval: 8 with UE  Current Status: 11 yes   4. Patient will demonstrate improved L hip extension strength to 4/5 to allow for improved ability to negotiate stairs at home. Status at last Eval: 3+/5  Current Status: 3+/5  no   5. Patient will increase FOTO Functional Status score to 63 to decrease functional limitations. Status at last Eval: 32 Current Status: 37 Progressing      Patient's POC has consisted of therex, therapeutic activities, manual therapy prn, modalities prn, pt. education, and a comprehensive HEP.  Treatment strategies used to address functional mobility deficits, ROM deficits, strength deficits, analyze and address soft tissue restrictions, analyze and cue movement patterns, analyze and modify body mechanics/ergonomics, assess and modify postural abnormalities and instruct in home and community integration. Key Functional Changes/Progress: The pt's FOTO score decreased to 43 indicating 57% limitation in functional ability. The pt reports that she does have decreased pain with stretching and that she can get out of the chair with a lot less pain. However, the pt still reports decreased sitting tolerance and reports pain that snaps down over her lateral left knee. IASTM to the ITB decreased pt symptoms today and continued conservative treatment will focus on hip girdle strengthening without compensation. The pt would benefit from continued skilled intervention to improve sitting tolerance, walking tolerance and lifting mechanics (FOTO score.)    Problem List: pain affecting function, decrease ROM, decrease strength, impaired gait/ balance, decrease ADL/ functional abilitiies, decrease activity tolerance, decrease flexibility/ joint mobility and decrease transfer abilities   Treatment Plan may include any combination of the following: Therapeutic exercise, Therapeutic activities, Neuromuscular re-education, Physical agent/modality, Gait/balance training, Manual therapy, Patient education, Self Care training, Functional mobility training, Home safety training and Stair training  Patient Goal(s) has been updated and includes: \"To be able to sit longer and do home schooling with my daughter at the dining room table and not have all this pain. \"    Goals for this certification period include and are to be achieved in   4-6  weeks:  1. Patient will be independent with an updated HEP to allow for carryover upon discharge. 2.   Patient will demonstrate at least 4/5 hip extension MMT B to improve pelvic stability with amb. 3.   Patient will perform 5/5 box lifts of 25# with no increase in pain to improve efficiency with household cleaning ADL's.   Frequency / Duration:   Patient to be seen   2-3   times per week for   4-6    weeks:    Assessments/Recommendations: The patient would continue to benefit from skilled interventions to address the above mentioned functional deficits. See above for more details. If you have any questions/comments please contact us directly at [phone]. Thank you for allowing us to assist in the care of your patient. Therapist Signature: Candis Guerrier DPT Date: 11/77/1547   Certification Period:  Reporting Period: NA  12/1/21 - 12/30/21 Time: 10:28 AM   NOTE TO PHYSICIAN:  PLEASE COMPLETE THE ORDERS BELOW AND FAX TO   [facility] Physical Therapy: [phone]. If you are unable to process this request in 24 hours please contact our office: [phone].    ___ I have read the above report and request that my patient continue as recommended.   ___ I have read the above report and request that my patient continue therapy with the following changes/special instructions: ________________________________________________   ___ I have read the above report and request that my patient be discharged from therapy.      Physician Signature:        Date:       Time:       Elizabeth Thrasher MD

## 2021-12-30 NOTE — PROGRESS NOTES
PHYSICAL THERAPY - DAILY TREATMENT NOTE    Patient Name: Yadira Holder        Date: 2021  : 1985   YES Patient  Verified  Visit #:     Insurance: Payor: SHERON JEFF MEDICAID / Plan: [address] [facility] / Product Type: Managed Care Medicaid /      In time: 8:45 Out time: 9:30   Total Treatment Time: 45     Medicare/BCBS Time Tracking (below)   Total Timed Codes (min):  NA 1:1 Treatment Time:  NA     TREATMENT AREA =  Left hip pain [M25.552]    SUBJECTIVE    Pain Level (on 0 to 10 scale):  2  / 10   Medication Changes/New allergies or changes in medical history, any new surgeries or procedures? NO     If yes, update Summary List   Subjective Functional Status/Changes:  []  No changes reported     \"I'm feeling well. I still have the hardest time when I go to get up after sitting a long time. I know I'm getting better though. I have an easier time getting out of the chair. \"          OBJECTIVE    Modalities Rationale: To improve activity tolerance for exercise performance and ADL's.    min [] Estim, type/location:                                      []  att     []  unatt     []  w/US     []  w/ice    []  w/heat    min []  Mechanical Traction: type/lbs                   []  pro   []  sup   []  int   []  cont    []  before manual    []  after manual    min []  Ultrasound, settings/location:      min []  Iontophoresis w/ dexamethasone, location:                                               []  take home patch       []  in clinic   NA min []  Ice     []  Heat    location/position:     min []  Vasopneumatic Device, press/temp:     min []  Other:    [x] Skin assessment post-treatment (if applicable):   [x]  intact    []  redness- no adverse reaction     []redness - adverse reaction:    9 min Therapeutic Exercise:  [x]  See flow sheet   Rationale:      increase ROM and increase strength to improve the patients ability to perform ADL's with imporved hip girdle stability.      8 min Therapeutic Activity: Sit to stands, bridges   Rationale: To increase safety and efficiency with ADL's. 10 min Neuromuscular Reeducation hip 3-way   Rationale: To improve postural awareness and eccentric control with relative SLS ADL's and ADL's on dynamic surfaces. 9 min Self-Care: Education on self use of IASTM to improve actiivty tolerance   Rationale: To improve symmetrical WB.    9 min Gait Training: Sidestepping, monster walk   Rationale: To improve safety and efficiency with gait. min Patient Education:  YES  Reviewed HEP   []  Progressed/Changed HEP based on:   Patient reports compliance     Other Objective/Functional Measures:    See progress note. Measured navicular drop 10 cm bilaterally and insignificant. IASTM decreased pain in standing. Pt requires cues to not compensate with hamstrings when performing prone glute kicks. See flowsheet for more details. Post Treatment Pain Level (on 0 to 10) scale:   0  / 10     ASSESSMENT    Assessment/Changes in Function:   WIll continue to progress strengthening/AROM per activity tolerance. []  See Progress Note/Recertification   Patient will continue to benefit from skilled PT services to modify and progress therapeutic interventions, address functional mobility deficits, address ROM deficits, address strength deficits, analyze and address soft tissue restrictions, analyze and cue movement patterns, analyze and modify body mechanics/ergonomics and assess and modify postural abnormalities to attain remaining goals. Progress toward goals / Updated goals: Addressed hip extension with prone glute kicks. Addressed pain with IASTM.      PLAN    [x]  Upgrade activities as tolerated YES Continue plan of care   []  Discharge due to :    []  Other:      Therapist: Lou Elizalde    Date: 12/30/2021 Time: 12:53 PM     Future Appointments   Date Time Provider Karishma Schultz   1/3/2022  2:45 PM [facility]   1/5/2022 2:45 PM Maik Caballero, PT [facility]   1/10/2022  2:45 PM Maik Caballero, PT [facility]   1/12/2022  2:45 PM [facility]

## 2022-01-03 ENCOUNTER — HOSPITAL ENCOUNTER (OUTPATIENT)
Dept: PHYSICAL THERAPY | Age: 37
Discharge: HOME | End: 2022-01-03
Attending: SPECIALIST
Payer: MEDICAID

## 2022-01-03 PROCEDURE — 97530 THERAPEUTIC ACTIVITIES: CPT

## 2022-01-03 PROCEDURE — 97110 THERAPEUTIC EXERCISES: CPT

## 2022-01-03 PROCEDURE — 97116 GAIT TRAINING THERAPY: CPT

## 2022-01-03 NOTE — PROGRESS NOTES
PHYSICAL THERAPY - DAILY TREATMENT NOTE    Patient Name: Fabian Koehler        Date: 1/3/2022  : 1985   YES Patient  Verified  Visit #:     Insurance: [facility] [place] [facility] / Plan: [address] [facility] / Product Type: Managed Care Medicaid /      In time: 2:45 Out time: 3:33   Total Treatment Time: 48     Medicare/BCBS [place] Time Tracking (below)   Total Timed Codes (min):  48 1:1 Treatment Time:  48     TREATMENT AREA =  Left hip pain [M25.552]    SUBJECTIVE    Pain Level (on 0 to 10 scale):  2  / 10   Medication Changes/New allergies or changes in medical history, any new surgeries or procedures? NO    If yes, update Summary List   Subjective Functional Status/Changes:  []  No changes reported     Pt reports feeling relief of symptoms for 2 days following cupping and IASTM techniques last visit. OBJECTIVE    15 min Therapeutic Exercise:  [x]  See flow sheet   Rationale:      increase ROM and increase strength to improve the patients ability to perform ADL's with greater ease. 15 min Therapeutic Activity: See flow sheet   Rationale:   increase mobility, endurance, and strength to improve patient's ability to complete functional tasks with greater ease. 5 not billed min Neuromuscular Re-ed: See flow sheet   Rationale:   improve balance, coordination, proprioception to improve patient's ability to complete functional tasks safely. 8 min Gait training: See flow sheet  Side stepping   Rationale:   increase mobility, endurance, and strength to improve patient's ability to complete functional tasks with greater ease.     5 not billed min Manual Therapy: See flow sheet  Astym to ITB and vastus lateralis f/b static cupping along ITB   Rationale:      decrease pain, increase ROM, increase tissue extensibility and decrease trigger points to improve patient's ability to perform functional mobility with less compensation and pain.    The manual therapy interventions were performed at a separate and distinct time from the therapeutic activities interventions     min Patient Education:  YES  Reviewed HEP   []  Progressed/Changed HEP based on:   Cont with HEP     Other Objective/Functional Measures:    Progressed with gluteal strengthening exercises. Introduced dead lifts using wall as tactile cue. Post Treatment Pain Level (on 0 to 10) scale:   2  / 10     ASSESSMENT    Assessment/Changes in Function:     Pt tolerated progression of exercises well with muscular fatigue noted. []  See Progress Note/Recertification   Patient will continue to benefit from skilled PT services to analyze, cue, progress, modify,, demonstrate, instruct, and address, movement patterns, therapeutic interventions, postural abnormalities, soft tissue restrictions, ROM, strength, functional mobility, body mechanics/ergonomics, and home and community integration, to attain remaining goals. Progress toward goals / Updated goals:    · Goals for this certification period include and are to be achieved in   4-6  weeks:  1. Patient will be independent with an updated HEP to allow for carryover upon discharge. 2.   Patient will demonstrate at least 4/5 hip extension MMT B to improve pelvic stability with amb. 3.   Patient will perform 5/5 box lifts of 25# with no increase in pain to improve efficiency with household cleaning ADL's.        PLAN    []  Upgrade activities as tolerated YES Continue plan of care   []  Discharge due to :    []  Other:      Therapist: Sarahy Sen PT, DPT    Date: 1/3/2022 Time: 9:43 AM     Future Appointments   Date Time Provider Karishma Schultz   1/3/2022  2:45 PM Maik Caballero PT [facility]   1/5/2022  2:45 PM Maik Caballero PT [facility]   1/10/2022  2:45 PM Maik Caballero PT [facility]   1/12/2022  2:45 PM Maik Caballero PT [facility]

## 2022-01-05 ENCOUNTER — HOSPITAL ENCOUNTER (OUTPATIENT)
Dept: PHYSICAL THERAPY | Age: 37
Discharge: HOME | End: 2022-01-05
Attending: SPECIALIST
Payer: MEDICAID

## 2022-01-05 PROCEDURE — 97530 THERAPEUTIC ACTIVITIES: CPT

## 2022-01-05 PROCEDURE — 97112 NEUROMUSCULAR REEDUCATION: CPT

## 2022-01-05 PROCEDURE — 97110 THERAPEUTIC EXERCISES: CPT

## 2022-01-05 PROCEDURE — 97116 GAIT TRAINING THERAPY: CPT

## 2022-01-05 NOTE — PROGRESS NOTES
PHYSICAL THERAPY - DAILY TREATMENT NOTE    Patient Name: Carito Shelton        Date: 2022  : 1985   YES Patient  Verified  Visit #:   10   of   24  Insurance: Payor: Kate Roa / Plan: [facility] / Product Type: Managed Care Medicaid /      In time: 2:49 Out time: 3:35   Total Treatment Time: 46     Medicare/BCBS [place] Time Tracking (below)   Total Timed Codes (min):  46 1:1 Treatment Time:  46     TREATMENT AREA =  Left hip pain [M25.552]    SUBJECTIVE    Pain Level (on 0 to 10 scale):  0  / 10   Medication Changes/New allergies or changes in medical history, any new surgeries or procedures? NO    If yes, update Summary List   Subjective Functional Status/Changes:  []  No changes reported     Pt reports feeling great since last visit. Had some pain along lateral L leg the following day as well as soreness. OBJECTIVE    15 min Therapeutic Exercise:  [x]? See flow sheet   Rationale:      increase ROM and increase strength to improve the patients ability to perform ADL's with greater ease.       15 min Therapeutic Activity: See flow sheet   Rationale:   increase mobility, endurance, and strength to improve patient's ability to complete functional tasks with greater ease. 8  min Neuromuscular Re-ed: See flow sheet  Pallof press: sawing motion f/b stirring the pot (5 CCW and 5 CW)   Rationale:   improve balance, coordination, proprioception to improve patient's ability to complete functional tasks safely. 8 min Gait training: See flow sheet  Side stepping   Rationale:   increase mobility, endurance, and strength to improve patient's ability to complete functional tasks with greater ease.         min Patient Education:  YES  Reviewed HEP   []  Progressed/Changed HEP based on:   Cont with HEP     Other Objective/Functional Measures:    Cont with outlined TE program emphasizing glute strengthening.       Post Treatment Pain Level (on 0 to 10) scale:   1  / 10 ASSESSMENT    Assessment/Changes in Function:     Pt progressing well.     []  See Progress Note/Recertification   Patient will continue to benefit from skilled PT services to analyze, cue, progress, modify,, demonstrate, instruct, and address, movement patterns, therapeutic interventions, postural abnormalities, soft tissue restrictions, ROM, strength, functional mobility, body mechanics/ergonomics, and home and community integration, to attain remaining goals. Progress toward goals / Updated goals:    · Goals for this certification period include and are to be achieved in   4-6  weeks:  1.   Patient will be independent with an updated HEP to allow for carryover upon discharge. 2.   Patient will demonstrate at least 4/5 hip extension MMT B to improve pelvic stability with amb. 3.   Patient will perform 5/5 box lifts of 25# with no increase in pain to improve efficiency with household cleaning ADL's.        PLAN    []  Upgrade activities as tolerated YES Continue plan of care   []  Discharge due to :    []  Other:      Therapist: Little Vazquez PT, DPT    Date: 1/5/2022 Time: 12:26 PM     Future Appointments   Date Time Provider Karishma Schultz   1/5/2022  2:45 PM [place] Nipper, PT [facility]   1/10/2022  2:45 PM [place] Nipper, PT [facility]   1/12/2022  2:45 PM [place] Nipper, PT [facility]   1/17/2022  2:45 PM [place] Nipper, PT [facility]   1/19/2022  2:45 PM Mireya Nipper, PT [facility]   1/24/2022  2:45 PM [place] Nipper, PT [facility]   1/26/2022  2:45 PM [place] Nipper, PT [facility]   1/31/2022  2:45 PM [place] Nipper, PT [id number]

## 2022-01-10 ENCOUNTER — HOSPITAL ENCOUNTER (OUTPATIENT)
Dept: PHYSICAL THERAPY | Age: 37
Discharge: HOME | End: 2022-01-10
Attending: SPECIALIST
Payer: MEDICAID

## 2022-01-10 PROCEDURE — 97116 GAIT TRAINING THERAPY: CPT

## 2022-01-10 PROCEDURE — 97110 THERAPEUTIC EXERCISES: CPT

## 2022-01-10 PROCEDURE — 97530 THERAPEUTIC ACTIVITIES: CPT

## 2022-01-10 NOTE — PROGRESS NOTES
PHYSICAL THERAPY - DAILY TREATMENT NOTE    Patient Name: Emiliano Marquez        Date: 1/10/2022  : 1985   YES Patient  Verified  Visit #:     Insurance: Payor: Faye Streeter / Plan: Joel Delgado / Product Type: Managed Care Medicaid /      In time: 2:45 Out time: 3:40   Total Treatment Time: 55     Medicare/BCBS [place] Time Tracking (below)   Total Timed Codes (min):  na 1:1 Treatment Time:  na     TREATMENT AREA =  Left hip pain [M25.552]    SUBJECTIVE    Pain Level (on 0 to 10 scale):  3-4  / 10   Medication Changes/New allergies or changes in medical history, any new surgeries or procedures? NO    If yes, update Summary List   Subjective Functional Status/Changes:  []  No changes reported     Pt c/o increased left hip pain from doing a lot of driving today, states she was in the car for about an hour and a half. Pt states she tried to some of the stretches, propped her heel on the curb and stretched her hamstring, did the figure 4 stretch and it helped some. OBJECTIVE    18 min Therapeutic Exercise:  [x]  See flow sheet   Rationale:    increase ROM, increase strength, improve coordination, improve balance and increase proprioception to promote increased functional mobility and increased activity tolerance with ADL's. 20 min Therapeutic Activity: see flow sheet    Rationale:    increase ROM, increase strength, improve coordination, improve balance and increase proprioception to promote increased functional mobility and increased activity tolerance with ADL's.    7 min Manual Therapy: The manual therapy interventions were performed at a separate and distinct time from the therapeutic activities interventions.  static cupping to left ITBand, dynamic cupping (medium) to lateral and distal quad with pt in right S/L    Rationale:  decrease pain, increase tissue extensibility and decrease trigger points to improve patient's ability to perform ADL's with greater ease and less pain. 10 min Gait Training: see flow sheet    Rationale:   improve coordination, improve balance, increase proprioception and improve gait pattern to increase safety and [place] with amb to promote increased functional mobility. min Patient Education:  YES  Reviewed HEP   []  Progressed/Changed HEP based on:   Pt reports performing LE stretches for symptom management with prolonged sitting/driving. Other Objective/Functional Measures:    Min VCing for form with glute kicks and dead lifts. Post Treatment Pain Level (on 0 to 10) scale:   1  / 10     ASSESSMENT    Assessment/Changes in Function:     Pt continues with c/o increase left hip pain with driving and is utilizing LE stretches for self symptom management. []  See Progress Note/Recertification   Patient will continue to benefit from skilled PT services to modify and progress therapeutic interventions, address functional mobility deficits, address ROM deficits, address strength deficits, analyze and address soft tissue restrictions, analyze and cue movement patterns, assess and modify postural abnormalities, and instruct in home and community integration to attain remaining goals. Progress toward goals / Updated goals:    1. Patient will be independent with an updated HEP to allow for carryover upon discharge. 2.   Patient will demonstrate at least 4/5 hip extension MMT B to improve pelvic stability with amb.   3.   Patient will perform 5/5 box lifts of 25# with no increase in pain to improve efficiency with household cleaning ADL's. dead lifts with 10lbs for box lift      PLAN    []  Upgrade activities as tolerated YES Continue plan of care   []  Discharge due to :    []  Other:      Therapist: Lisa Bcah PTA    Date: 1/10/2022 Time: 3:30 PM     Future Appointments   Date Time Provider Karishma Schultz   1/12/2022  2:45 PM Wayne Herrera PT [facility]   1/17/2022  2:45 PM [facility]   1/19/2022  2:45 PM Polina Carlisle, PT [facility]   1/24/2022  2:45 PM Polina Carlisle, PT [facility]   1/26/2022  2:45 PM Polina Carlisle, PT [facility]   1/31/2022  2:45 PM Polina Carlisle, PT [facility]

## 2022-01-12 ENCOUNTER — HOSPITAL ENCOUNTER (OUTPATIENT)
Dept: PHYSICAL THERAPY | Age: 37
Discharge: HOME | End: 2022-01-12
Attending: SPECIALIST
Payer: MEDICAID

## 2022-01-12 PROCEDURE — 97110 THERAPEUTIC EXERCISES: CPT

## 2022-01-12 PROCEDURE — 97530 THERAPEUTIC ACTIVITIES: CPT

## 2022-01-12 PROCEDURE — 97112 NEUROMUSCULAR REEDUCATION: CPT

## 2022-01-12 PROCEDURE — 97116 GAIT TRAINING THERAPY: CPT

## 2022-01-12 NOTE — PROGRESS NOTES
PHYSICAL THERAPY - DAILY TREATMENT NOTE    Patient Name: Pete Blackburn        Date: 2022  : 1985   YES Patient  Verified  Visit #:   12   of   15  Insurance: [facility] [place] [facility] / Plan: [facility] / Product Type: Managed Care Medicaid /      In time: 2:45 Out time: 3:35   Total Treatment Time: 50     Medicare/BCBS [place] Time Tracking (below)   Total Timed Codes (min):  na 1:1 Treatment Time:  na     TREATMENT AREA =    Left hip pain [M25.552]    SUBJECTIVE    Pain Level (on 0 to 10 scale):  1  / 10   Medication Changes/New allergies or changes in medical history, any new surgeries or procedures? NO    If yes, update Summary List   Subjective Functional Status/Changes:  []  No changes reported     My hip is a 1/10 and it's probably because of the cold; other than that it feels fine. OBJECTIVE    15 min Therapeutic Exercise:  [x]  See flow sheet   Rationale:      increase ROM and increase strength to improve the patients ability to perform ADL's with greater ease. 15 min Therapeutic Activity: See flow sheet   Rationale:   increase mobility, endurance, and strength to improve patient's ability to complete functional tasks with greater ease. 10 min Neuromuscular Re-ed: See flow sheet   Rationale:   improve balance, coordination, proprioception to improve patient's ability to complete functional tasks safely. 10 min Gait Training: see flow sheet    Rationale:   improve coordination, improve balance, increase proprioception and improve gait pattern to increase safety and [place] with amb to promote increased functional mobility. min Patient Education:  YES  Reviewed HEP   []  Progressed/Changed HEP based on:   Cont with HEP     Other Objective/Functional Measures:    Progressed reps for deadlifts and squats to further challenge patient - able to complete with only c/o mild L knee pain which was corrected with technique modification for squats. Increased clearance during bridging activity on alessandro disc indicating improved posterior chain strength and gains in mobility. Post Treatment Pain Level (on 0 to 10) scale:   2  / 10     ASSESSMENT    Assessment/Changes in Function:     Pt demonstrating good tolerance to progression of activities in clinic indicating improved strength and endurance. Pt remains limited with prolonged sitting activities. Suggested patient attempt PPT or using a rolled up lumbar roll for prolonged seated activities in attempts to alleviate remaining hip pain. []  See Progress Note/Recertification   Patient will continue to benefit from skilled PT services to analyze, cue, progress, modify,, demonstrate, instruct, and address, movement patterns, therapeutic interventions, postural abnormalities, soft tissue restrictions, ROM, strength, functional mobility, body mechanics/ergonomics, and home and community integration, to attain remaining goals. Progress toward goals / Updated goals:    1.   Patient will be independent with an updated HEP to allow for carryover upon discharge. 2.   Patient will demonstrate at least 4/5 hip extension MMT B to improve pelvic stability with amb.   3.   Patient will perform 5/5 box lifts of 25# with no increase in pain to improve efficiency with household cleaning ADL's. dead lifts with 10lbs for box lift        PLAN    []  Upgrade activities as tolerated YES Continue plan of care   []  Discharge due to :    []  Other:      Therapist: Jam Logan PT, DPT    Date: 1/12/2022 Time: 12:44 PM     Future Appointments   Date Time Provider Karishma Schultz   1/12/2022  2:45 PM Susy Castro, PT [facility]   1/17/2022  2:45 PM Susy Castro, PT [facility]   1/19/2022  2:45 PM Susy Castro PT [facility]   1/24/2022  2:45 PM Susy Castro PT [facility]   1/26/2022  2:45 PM Susy Castro PT [facility]   1/31/2022  2:45 PM Susy Castro PT [facility]

## 2022-01-17 ENCOUNTER — HOSPITAL ENCOUNTER (OUTPATIENT)
Dept: PHYSICAL THERAPY | Age: 37
Discharge: HOME | End: 2022-01-17
Attending: SPECIALIST
Payer: MEDICAID

## 2022-01-17 PROCEDURE — 97112 NEUROMUSCULAR REEDUCATION: CPT

## 2022-01-17 PROCEDURE — 97110 THERAPEUTIC EXERCISES: CPT

## 2022-01-17 PROCEDURE — 97530 THERAPEUTIC ACTIVITIES: CPT

## 2022-01-17 PROCEDURE — 97116 GAIT TRAINING THERAPY: CPT

## 2022-01-17 NOTE — PROGRESS NOTES
PHYSICAL THERAPY - DAILY TREATMENT NOTE    Patient Name: Emma Ospina        Date: 2022  : 1985   YES Patient  Verified  Visit #:   15   of   15  Insurance: Payor: [address] / Plan: [address] [facility] / Product Type: Managed Care Medicaid /      In time: 2:53 Out time: 3:43   Total Treatment Time: 50     Medicare/BCBS [place] Time Tracking (below)   Total Timed Codes (min):  na 1:1 Treatment Time:  na     TREATMENT AREA =  Left hip pain [M25.552]    SUBJECTIVE    Pain Level (on 0 to 10 scale):  2  / 10   Medication Changes/New allergies or changes in medical history, any new surgeries or procedures? NO    If yes, update Summary List   Subjective Functional Status/Changes:  []  No changes reported     Patient reports trying to use rolled up towel behind lower back when sitting for long duration in car, she reports this helped tremendously and was able to sit 1 hour 15 minutes with 1-2/10 pain compared to 5/10 pain previously. Much improved. OBJECTIVE    15 min Therapeutic Exercise:  [x]  See flow sheet   Rationale:      increase ROM and increase strength to improve the patients ability to perform ADL's with greater ease. 15 min Therapeutic Activity: See flow sheet   Rationale:   increase mobility, endurance, and strength to improve patient's ability to complete functional tasks with greater ease. 10 min Neuromuscular Re-ed: See flow sheet   Rationale:   improve balance, coordination, proprioception to improve patient's ability to complete functional tasks safely. 10 min Gait training: See flow sheet   Rationale:   improve coordination, improve balance, increase proprioception and improve gait pattern to increase safety and [place] with amb to promote increased functional mobility.        min Patient Education:  YES  Reviewed HEP   []  Progressed/Changed HEP based on:   Cont with HEP     Other Objective/Functional Measures:    Pt progressing very well at this time, reduction of pain levels upon leaving clinic. Post Treatment Pain Level (on 0 to 10) scale:   1  / 10     ASSESSMENT    Assessment/Changes in Function:     Prepare for DC in upcoming visits. []  See Progress Note/Recertification   Patient will continue to benefit from skilled PT services to analyze, cue, progress, modify,, demonstrate, instruct, and address, movement patterns, therapeutic interventions, postural abnormalities, soft tissue restrictions, ROM, strength, functional mobility, body mechanics/ergonomics, and home and community integration, to attain remaining goals. Progress toward goals / Updated goals:    1.   Patient will be independent with an updated HEP to allow for carryover upon discharge. 2.   Patient will demonstrate at least 4/5 hip extension MMT B to improve pelvic stability with amb.   3.   Patient will perform 5/5 box lifts of 25# with no increase in pain to improve efficiency with household cleaning ADL's. dead lifts with 10lbs for box lift         PLAN    []  Upgrade activities as tolerated YES Continue plan of care   []  Discharge due to :    []  Other:      Therapist: Jam Logan PT, DPT    Date: 1/17/2022 Time: 1:11 PM     Future Appointments   Date Time Provider Karishma Schultz   1/17/2022  2:45 PM Susy Castro PT [facility]   1/19/2022  2:45 PM Susy Castro PT [facility]   1/24/2022  2:45 PM Susy Castro PT [facility]   1/26/2022  2:45 PM Susy Castro PT [facility]   1/31/2022  2:45 PM [facility]

## 2022-01-19 ENCOUNTER — APPOINTMENT (OUTPATIENT)
Dept: PHYSICAL THERAPY | Age: 37
End: 2022-01-19
Attending: SPECIALIST
Payer: MEDICAID

## 2022-01-24 ENCOUNTER — HOSPITAL ENCOUNTER (OUTPATIENT)
Dept: PHYSICAL THERAPY | Age: 37
Discharge: HOME | End: 2022-01-24
Attending: SPECIALIST
Payer: MEDICAID

## 2022-01-24 PROCEDURE — 97116 GAIT TRAINING THERAPY: CPT

## 2022-01-24 PROCEDURE — 97530 THERAPEUTIC ACTIVITIES: CPT

## 2022-01-24 PROCEDURE — 97112 NEUROMUSCULAR REEDUCATION: CPT

## 2022-01-24 PROCEDURE — 97110 THERAPEUTIC EXERCISES: CPT

## 2022-01-24 NOTE — PROGRESS NOTES
PHYSICAL THERAPY - DAILY TREATMENT NOTE    Patient Name: Keri Jimenez        Date: 2022  : 1985   yes Patient  Verified  Visit #:   15   of   15  Insurance: Payor: [address] / [facility] [address] [facility] [place] / Product Type: Managed Care Medicaid /      In time: 044 Out time: 348   Total Treatment Time: 56     TREATMENT AREA =  Left hip pain [M25.552]    SUBJECTIVE  Pain Level (on 0 to 10 scale):  0  / 10   Medication Changes/New allergies or changes in medical history, any new surgeries or procedures?    no  If yes, update Summary List   Subjective Functional Status/Changes:  []  No changes reported     \"I am great in my back but I have a HA. its my sinus' from the snow ball fight I had probably\"           OBJECTIVE    18 min Therapeutic Exercise:  [x]  See flow sheet   Rationale:      increase ROM, increase strength, improve coordination and improve balance to improve the patients ability to safely perform ADLs, bending/stooping/ lifting; perform prolong sitting/standing/ambulation; and negotiate stairs with no pain or limitations        15 min Therapeutic Activity: [x]  See flow sheet   Rationale:    increase ROM, increase strength and improve coordination to improve the patients ability to safely perform ADLs, bending/stooping/ lifting; perform prolong sitting/standing/ambulation; and negotiate stairs with no pain or limitations       13 min Neuromuscular Re-ed: [x]  See flow sheet   Rationale:    increase ROM, increase strength, improve coordination, improve balance and increase proprioception to improve the patients ability to safely perform ADLs, bending/stooping/ lifting; perform prolong sitting/standing/ambulation; and negotiate stairs with no pain or limitations     10 min Gait Training:  (I) with [place] x 60' x 2 fwd/retro/lateral with GTB     Rationale:  To improve ambulation safety and efficiency in order to improve patient's ability to safely ambulate at home for self care. Billed With/As:   [x] TE   [x] TA   [x] Neuro   [] Self Care Patient Education: [x] Review HEP    [] Progressed/Changed HEP based on:   [x] positioning   [x] body mechanics   [] transfers   [] heat/ice application    [x] other: Pt ed on importance and benefits of compliance with HEP, core strength/stability and proper posture; pt verbalized understanding  issued GTB for HEP      Other Objective/Functional Measures:    min VCing for proper tech  VCs to increase RLE WBing with squats and RDLs  fatigues after RDLs  min left L/s discomfort after completing left side stepping, abolished with SUSAN  initiated hip ext with 2# x 10;  no c/o pain noted at this time    discussed with D/C HEP NV     Post Treatment Pain Level (on 0 to 10) scale:   0  / 10     ASSESSMENT  Assessment/Changes in Function:   increased to GTB from OTB for pallof press with no difficulty or pain, indicating increase strength  demos proper squat form with 10# with instruction, progressing towards lifting goals   []  See Progress Note/Recertification   Patient will continue to benefit from skilled PT services to modify and progress therapeutic interventions, address functional mobility deficits, address ROM deficits, address strength deficits, analyze and address soft tissue restrictions, analyze and cue movement patterns, analyze and modify body mechanics/ergonomics, assess and modify postural abnormalities and instruct in home and community integration to attain remaining goals. Progress toward goals / Updated goals:  · Goals for this certification period include and are to be achieved in   4-6  weeks:  1. Patient will be independent with an updated HEP to allow for carryover upon discharge. 2.   Patient will demonstrate at least 4/5 hip extension MMT B to improve pelvic stability with amb. initiated prone hip ext with 2#  3.    Patient will perform 5/5 box lifts of 25# with no increase in pain to improve efficiency with household cleaning ADL's. demos proper squat form with 10#      PLAN  [x]  Upgrade activities as tolerated yes Continue plan of care   []  Discharge due to :    [x]  Other: d/c with HEP NV     Therapist: Jose Cruz Groves PTA    Date: 1/24/2022 Time: 4:13 PM     Future Appointments   Date Time Provider Karishma Schultz   1/26/2022  2:45 PM Susy Castro PT [facility]   1/31/2022  2:45 PM [facility]

## 2022-01-26 ENCOUNTER — HOSPITAL ENCOUNTER (OUTPATIENT)
Dept: PHYSICAL THERAPY | Age: 37
Discharge: HOME | End: 2022-01-26
Attending: SPECIALIST
Payer: MEDICAID

## 2022-01-26 PROCEDURE — 97110 THERAPEUTIC EXERCISES: CPT

## 2022-01-26 PROCEDURE — 97530 THERAPEUTIC ACTIVITIES: CPT

## 2022-01-26 PROCEDURE — 97112 NEUROMUSCULAR REEDUCATION: CPT

## 2022-01-26 NOTE — PROGRESS NOTES
[facility] PHYSICAL THERAPY  [address] [facility], [address] - Phone: [phone]  Fax: [phone]  DISCHARGE SUMMARY FOR PHYSICAL THERAPY          Patient Name: Tawnya Suárez : 1985   Treatment/Medical Diagnosis: Left hip pain [M25.552]   Referral Source: Saddie Goodpasture, MD [facility]): 2021   Prior Hospitalization: See medical history Provider #: [id number]   [facility]: 15 Missed Visits: 1     Goal/Measure of Progress Goal Met? 1. Patient will be independent with an updated HEP to allow for carryover upon discharge. Status at last Eval: Ongoing Current Status: Able yes   2. Patient will demonstrate at least 4/5 hip extension MMT B to improve pelvic stability with amb. Status at last Eval: 3+/5 Current Status: 4/5 yes   3. Patient will perform 5/5 box lifts of 25# with no increase in pain to improve efficiency with household cleaning ADL's. Status at last Eval: Painful Current Status: Able yes     Key Functional Changes/Progress: Reassessment performed today. Pt reports 70-80% improvement since initiating PT. Pt demonstrates gains in hip strength in all planes of motion. Continues to experience pain anterolateral L hip only with prolonged sitting although we have found that by placing lumbar roll behind back when sitting reduces pain levels and makes task more manageable. Improved ability to negotiate stairs, ambulate, squat, and complete household chores including laundry. Pt is able to demonstrate independence with learned exercises. Pt has met all of her short and long term goals at this time and is being discharged today secondary to above factors. Pt was educated to continue with daily exercise program and to contact the clinic should symptoms return or worsen. Thank you for allowing me the opportunity to assist in this case. Assessments/Recommendations: Discontinue therapy.  Progressing towards or have reached established goals. If you have any questions/comments please contact us directly at [phone]. Thank you for allowing us to assist in the care of your patient. Therapist Signature: Gita Hugo PT, DPT Date: 1/26/2022   Reporting Period: na Time: 5:97 AM      Certification Period: na       NOTE TO PHYSICIAN:  PLEASE COMPLETE THE ORDERS BELOW AND FAX TO   [facility]: [phone]. If you are unable to process this request in 24 hours please contact our office: [phone].    ___ I have read the above report and request that my patient be discharged from therapy.      Physician Signature:        Date:       Time:

## 2022-01-26 NOTE — PROGRESS NOTES
PHYSICAL THERAPY - DAILY TREATMENT NOTE    Patient Name: Tawnya Suárez        Date: 2022  : 1985   YES Patient  Verified  Visit #:   15   of   15  Insurance: Payor: [address] / Plan: [address] [email or web address] / Product Type: Managed Care Medicaid /      In time: 2:47 Out time: 3:32   Total Treatment Time: na     Medicare/BCBS [place] Time Tracking (below)   Total Timed Codes (min):  na 1:1 Treatment Time:  na     TREATMENT AREA =  Left hip pain [M25.552]    SUBJECTIVE    Pain Level (on 0 to 10 scale):  1  / 10   Medication Changes/New allergies or changes in medical history, any new surgeries or procedures? NO    If yes, update Summary List   Subjective Functional Status/Changes:  []  No changes reported     Patient reports pain is minimal currently, reports 1/10 pain only because of the cold weather. Was able to sit for 1 hour and 15 minutes using toll roll behind l/spine with 2/10 pain, great improvement since initial evaluation           OBJECTIVE    15 min Therapeutic Exercise:  [x]  See flow sheet   Rationale:      increase ROM and increase strength to improve the patients ability to perform ADL's with greater ease. 15 min Therapeutic Activity: See flow sheet  Reassessment and foto    Rationale:   increase mobility, endurance, and strength to improve patient's ability to complete functional tasks with greater ease. 15 min Neuromuscular Re-ed: See flow sheet   Rationale:   improve balance, coordination, proprioception to improve patient's ability to complete functional tasks safely.         min Patient Education:  YES  Reviewed HEP   []  Progressed/Changed HEP based on:   Cont with HEP     Other Objective/Functional Measures:    Refer to DC note     Post Treatment Pain Level (on 0 to 10) scale:    10     ASSESSMENT    Assessment/Changes in Function:     Refer to DC note     []  See Progress Note/Recertification   Patient will continue to benefit from skilled PT services to analyze, cue, progress, modify,, demonstrate, instruct, and address, movement patterns, therapeutic interventions, postural abnormalities, soft tissue restrictions, ROM, strength, functional mobility, body mechanics/ergonomics, and home and community integration, to attain remaining goals.    Progress toward goals / Updated goals:    Refer to DC note     PLAN    []  Upgrade activities as tolerated YES Continue plan of care   []  Discharge due to :    []  Other:      Therapist: Radha Rayo, PT, DPT    Date: 1/26/2022 Time: 9:18 AM     Future Appointments   Date Time Provider Karishma Schultz   1/26/2022  2:45 PM Alirio Jolley, PT [facility]   1/31/2022  2:45 PM Alirio Jolley, PT [facility]

## 2022-01-31 ENCOUNTER — APPOINTMENT (OUTPATIENT)
Dept: PHYSICAL THERAPY | Age: 37
End: 2022-01-31
Attending: SPECIALIST
Payer: MEDICAID

## 2022-03-08 ENCOUNTER — OFFICE VISIT (OUTPATIENT)
Dept: ORTHOPEDIC SURGERY | Age: 37
End: 2022-03-08
Payer: MEDICAID

## 2022-03-08 VITALS
OXYGEN SATURATION: 96 % | HEART RATE: 74 BPM | WEIGHT: 274 LBS | HEIGHT: 62 IN | BODY MASS INDEX: 50.42 KG/M2 | TEMPERATURE: 96.9 F

## 2022-03-08 DIAGNOSIS — M16.12 PRIMARY OSTEOARTHRITIS OF LEFT HIP: ICD-10-CM

## 2022-03-08 DIAGNOSIS — M25.552 ACUTE HIP PAIN, LEFT: ICD-10-CM

## 2022-03-08 DIAGNOSIS — M16.12 PRIMARY OSTEOARTHRITIS OF LEFT HIP: Primary | ICD-10-CM

## 2022-03-08 PROCEDURE — 72170 X-RAY EXAM OF PELVIS: CPT | Performed by: PHYSICIAN ASSISTANT

## 2022-03-08 PROCEDURE — 99213 OFFICE O/P EST LOW 20 MIN: CPT | Performed by: PHYSICIAN ASSISTANT

## 2022-03-08 RX ORDER — DICLOFENAC SODIUM 10 MG/G
4 GEL TOPICAL 4 TIMES DAILY
Qty: 100 G | Refills: 2 | Status: SHIPPED | OUTPATIENT
Start: 2022-03-08 | End: 2022-04-22 | Stop reason: SDUPTHER

## 2022-03-08 NOTE — PROGRESS NOTES
Patient: Dragan Camargo                MRN: [id number]       SSN: [id number]  YOB: 1985        AGE: 31 y.o. SEX: female          PCP: Dominick Adams  03/08/22    Chief Complaint   Patient presents with    Hip Pain     Left       HISTORY:  Dragan Camargo is a 31 y.o. female returns to the office with a complaint of left hip acute on pain. She was seen and treated under the care of Dr. Anil Martin for trochanteric bursal symptoms of the left hip late last year. The injection only lasted about 2 to 3 weeks. She was told at that time by Dr. Alda Cage that she did have osteoarthritic findings of the left hip and would benefit with a aggressive weight loss strategy. Pain is intermittent associated with the left outer hip and near the trochanteric region however of recent patients pain into the crotch/growing has increased significantly. She went on a car ride to [facility] over the past week and had a miserable time sitting with pain moderate to severe radiating into her crotch. She denies any bowel or bladder incontinence. No radicular pain reported in lower extremities. Associated with the left trochanteric region pain starts over the upper portion of her left thigh laterally and extends down to just above her left knee laterally. Tylenol and Motrin have been minimally successful for symptom management. Pain Assessment  3/8/2022   Location of Pain Hip   Location Modifiers Left   Severity of Pain 6   Quality of Pain Throbbing; Sharp   Quality of Pain Comment -   Duration of Pain Persistent   Frequency of Pain Constant   Date Pain First Started -   Date Pain First Started Comment -   Aggravating Factors Walking; Other (Comment)   Aggravating Factors Comment Sitting for a while   Limiting Behavior No   Relieving Factors Rest   Relieving Factors Comment -   Result of Injury No           Lab Results   Component Value Date/Time    Hemoglobin A1c 6.4 (H) 09/28/2021 12:00 AM     Weight Metrics 3/8/2022 11/18/2021 10/29/2021 8/16/2018 7/13/2018 4/3/2018 12/19/2017   Weight 274 lb 272 lb 271 lb 253 lb 248 lb 248 lb 12.8 oz 249 lb   BMI 50.12 kg/m2 49.75 kg/m2 49.57 kg/m2 46.27 kg/m2 45.36 kg/m2 44.21 kg/m2 44.25 kg/m2            Problem List Items Addressed This Visit     None      Visit Diagnoses     Primary osteoarthritis of left hip    -  Primary    Relevant Orders    POC XRAY, PELVIS; 1 OR 2 VIEWS (Completed)    Acute hip pain, left        Relevant Orders    POC XRAY, PELVIS; 1 OR 2 VIEWS (Completed)          PAST MEDICAL HISTORY:   Past Medical History:   Diagnosis Date    Deviated septum 2012    Multiple nasal polyps 2012    removed        PAST SURGICAL HISTORY:   Past Surgical History:   Procedure Laterality Date    HX TONSILLECTOMY  2008    HX WISDOM TEETH EXTRACTION  2010       ALLERGIES:   Allergies   Allergen Reactions    Asa-Acetaminophen-Caff-Buffers Anaphylaxis     Aspirin portion        CURRENT MEDICATIONS:  A list of medications prior to the time of admission include:  Prior to Admission medications    Medication Sig Start Date End Date Taking? Authorizing Provider   ergocalciferol (ERGOCALCIFEROL) 1,250 mcg (50,000 unit) capsule Take 1 Capsule by mouth every seven (7) days. 10/15/21   Mitchel Tenorio PA-C   fluticasone propionate (FLONASE) 50 mcg/actuation nasal spray 2 sprays each nostril nightly. 7/16/21   Mitchel Tenorio PA-C   sertraline (ZOLOFT) 50 mg tablet Take 1 Tablet by mouth daily. 7/16/21   Carlos Tenorio PA-C   levonorgestreL (MIRENA) 20 mcg/24 hours (5 yrs) 52 mg IUD 1 Device by IntraUTERine route once.     Provider, Historical       FAMILY HISTORY:   Family History   Problem Relation Age of Onset    Diabetes Mother        SOCIAL HISTORY:   Social History     Socioeconomic History    Marital status:     Number of children: 1    Years of education: 15   Occupational History    Occupation: none     Employer: NOT EMPLOYED   Tobacco Use    Smoking status: Never Smoker    Smokeless tobacco: Never Used   Substance and Sexual Activity    Alcohol use: No     Alcohol/week: 0.0 standard drinks    Drug use: No    Sexual activity: Yes     Partners: Male     Birth control/protection: Condom   Other Topics Concern     Service No    Blood Transfusions No    Caffeine Concern No    Occupational Exposure No    Hobby Hazards No    Sleep Concern No    Stress Concern No    Weight Concern Yes    Special Diet No    Back Care Yes    Exercise No    Bike Helmet No    Seat Belt Yes    Self-Exams Yes     Comment: 3 months       ROS:No CP, No SOB, No fever/chills nor night sweats. No headaches, vision abnormalities to include double and or loss of vision. No dizziness. No hearing abnormalities. No Chest Pain nor Shortness of breath. Pt denies h/o spinal surgery, injections, or PT/chiropractor. Patient has attempted self treatment with less than adequate relief on oral and topical analgesic / anti inflammatory medications . Pt denies change in bowel or bladder habits. No saddle paresthesia / anesthesia. Pt denies fever, unplanned weight loss / weight gains, and no skin changes. Musculoskeletal pain per HPI. Pain is exacerbated positionally. PHYSICAL EXAM:    Visit Vitals  Pulse 74   Temp 96.9 °F (36.1 °C) (Temporal)   Ht 5' 2\" (1.575 m)   Wt 274 lb (124.3 kg)   SpO2 96%   BMI 50.12 kg/m²       Constitutional: Appears well-developed and well-nourished. No distress. Sitting comfortably in the exam room, interacting with conversation with pleasant affect. Gait appears steady and patient exhibits no evidence of ataxia. Patient is able to ambulate with caution. No focal neurological deficit noted. No facial droop, slurred speech, or evidence of altered mentation noted on exam.   Skin: Skin over the head, neck, bilateral limbs, and trunk is warm and dry.  No rash or erythema noted. Cranial Nerves II-XII grossly intact  HENT: NC/AT. Normal symmetry, bulk and tone of facial and neck musculature. Trachea midline. No discernible thyromegaly or masses. No involuntary movements. Lymphatic: No preauricular, submandibuar, anterior or posterior cervical lymphadenopathy. Psychiatric: The patient is awake, alert, and oriented to person, place and time. Behavior is normal. Thought content normal.   Cardiovascular: No clubbing, cyanosis. No edema bilateral lower extremities. Pulmonary: No tripoding nor accessory muscle recruitment. Breathing normally, no distress, no audible wheezing. Distal cap refill intact at 2/2 Pio UE / LE. Neuro intact Pio UE/LE to noxious stimuli        Ortho Specific exam:    Patient has an antalgic gait. She has a limp to the left favoring. External rotation of the left hip painful with radiation into the groin noted passively at barely 10 degrees today. External rotation passively 15 degrees without pain or guarding. Modest weakness in the left hip flexors against resistance today noted at 4-/5. Quad and femoral nerve activity full left lower extremity. Distal sensation intact fully left lower extremity. Left hip laterally point tenderness over the greater trochanteric region with pain radiating proximal 4 cm and distal 10 cm along the IT band. [facility] 3/8/2022 space AP pelvis reveals osteoarthritic findings noted in the femoral acetabular joint space. There is significant spurring seen at the superior rim of the acetabulum and less but still present though at the inferior rim. No lytic or blastic lesions noted. No soft tissue ossifications noted. No fracture deformity seen. Gynecological device noted associated with the cervix. IMPRESSION:      ICD-10-CM ICD-9-CM    1. Primary osteoarthritis of left hip  M16.12 715.15 POC XRAY, PELVIS; 1 OR 2 VIEWS   2.  Acute hip pain, left M25.552 719.45 POC XRAY, PELVIS; 1 OR 2 VIEWS   3. BMI 50.12  4. Decreased range of motion left hip secondary to above  5. Trochanteric bursitis left hip recurrent  6. IT band syndrome left lower extremity. PLAN: Today we discussed alternatives to care to include but not limited to a interarticular cortisone injection to assist in her management of osteoarthritic pain to the left hip. Hopefully if we can get her gait to improve then her trochanteric bursitis symptoms will also resolve. Recommendation for therapy also placed. For the trochanteric symptoms of the left hip today I recommended Voltaren gel 4 g topical application to the point of maximal tenderness 4 times daily. That was sent to the pharmacy on file. Additionally today we discussed the diagnosis of obesity and the importance of weight management for both cardiovascular health. The patient was recommended to decrease carbohydrate and sugar intake. Patient recommended a formal dietary consult which they will consider and return a call to our office. In light of the patient's osteoarthritic findings I am making a recommendation for aerobic exercise to include but not limited to stationary bicycle, elliptical, therapeutic walking with good shoes and or swimming. Patient should avoid any running or jumping. If using the treadmill then recommendation for no elevation and no running or jogging. Care plan outlined and precautions discussed. Results were reviewed with the patient. All medications were reviewed with the patient. All of pt's questions and concerns were addressed. Alarm symptoms and return precautions associated with chief complaint and evaluation were reviewed with the patient in detail. The patient demonstrated adequate understanding. The patient expresses willing compliance with the treatment plan. No Narcotic indicated today. Patient given pain medication for short term acute pain relief.  Goal is to treat patient according to above plan and to ultimately have patient off all pain medications once appropriate. If chronic pain management is required beyond what is expected for current orthopedic problem, will refer patient to pain management.  was reviewed and will be reviewed with every medication refill request.         Patient provided a reminder for a \"due or due soon\" health maintenance. I have asked the patient to schedule an appointment with their primary care provider for follow-up on general health maintenance concerns. Today all the patient's questions were answered to their satisfaction. Copies of x-rays reviewed if obtained this visit, and provided to patient. Dictation disclaimer:  Please note that this dictation was completed with Bridge, the computer voice recognition software. Quite often unanticipated grammatical, syntax, homophones, and other interpretive errors are inadvertently transcribed by the computer software. Please disregard these errors. Please excuse any errors that have escaped final proofreading. Ilene Press  Sejal MAY, APC, MPAS, PA-C  [facility]

## 2022-03-16 ENCOUNTER — HOSPITAL ENCOUNTER (OUTPATIENT)
Dept: GENERAL RADIOLOGY | Age: 37
Discharge: HOME | End: 2022-03-16
Attending: PHYSICIAN ASSISTANT
Payer: MEDICAID

## 2022-03-16 PROCEDURE — 77002 NEEDLE LOCALIZATION BY XRAY: CPT

## 2022-03-16 PROCEDURE — 74011000636 HC RX REV CODE- 636: Performed by: PHYSICIAN ASSISTANT

## 2022-03-16 PROCEDURE — 74011000250 HC RX REV CODE- 250: Performed by: PHYSICIAN ASSISTANT

## 2022-03-16 PROCEDURE — 74011250636 HC RX REV CODE- 250/636: Performed by: PHYSICIAN ASSISTANT

## 2022-03-16 RX ORDER — TRIAMCINOLONE ACETONIDE 40 MG/ML
40 INJECTION, SUSPENSION INTRA-ARTICULAR; INTRAMUSCULAR
Status: COMPLETED | OUTPATIENT
Start: 2022-03-16 | End: 2022-03-16

## 2022-03-16 RX ORDER — LIDOCAINE HYDROCHLORIDE 10 MG/ML
10 INJECTION, SOLUTION EPIDURAL; INFILTRATION; INTRACAUDAL; PERINEURAL
Status: COMPLETED | OUTPATIENT
Start: 2022-03-16 | End: 2022-03-16

## 2022-03-16 RX ADMIN — IOHEXOL 2 ML: 180 INJECTION INTRAVENOUS at 14:00

## 2022-03-16 RX ADMIN — LIDOCAINE HYDROCHLORIDE 10 ML: 10 INJECTION, SOLUTION EPIDURAL; INFILTRATION; INTRACAUDAL; PERINEURAL at 14:00

## 2022-03-16 RX ADMIN — TRIAMCINOLONE ACETONIDE 40 MG: 40 INJECTION, SUSPENSION INTRA-ARTICULAR; INTRAMUSCULAR at 14:00

## 2022-03-18 PROBLEM — E66.01 OBESITY, MORBID (HCC): Status: ACTIVE | Noted: 2017-12-19

## 2022-03-19 PROBLEM — F33.9 RECURRENT DEPRESSION (HCC): Status: ACTIVE | Noted: 2018-04-03

## 2022-04-04 DIAGNOSIS — E55.9 VITAMIN D DEFICIENCY: ICD-10-CM

## 2022-04-06 RX ORDER — ERGOCALCIFEROL 1.25 MG/1
CAPSULE ORAL
Qty: 12 CAPSULE | Refills: 1 | Status: SHIPPED | OUTPATIENT
Start: 2022-04-06 | End: 2022-09-05

## 2022-04-22 ENCOUNTER — OFFICE VISIT (OUTPATIENT)
Dept: ORTHOPEDIC SURGERY | Age: 37
End: 2022-04-22
Payer: MEDICAID

## 2022-04-22 VITALS
OXYGEN SATURATION: 96 % | HEIGHT: 64 IN | TEMPERATURE: 97.1 F | BODY MASS INDEX: 46.88 KG/M2 | HEART RATE: 96 BPM | WEIGHT: 274.6 LBS

## 2022-04-22 DIAGNOSIS — M16.12 PRIMARY OSTEOARTHRITIS OF LEFT HIP: Primary | ICD-10-CM

## 2022-04-22 DIAGNOSIS — M25.551 RIGHT HIP PAIN: ICD-10-CM

## 2022-04-22 DIAGNOSIS — M16.11 ARTHRITIS OF RIGHT HIP: ICD-10-CM

## 2022-04-22 PROCEDURE — 99213 OFFICE O/P EST LOW 20 MIN: CPT | Performed by: PHYSICIAN ASSISTANT

## 2022-04-22 PROCEDURE — 73502 X-RAY EXAM HIP UNI 2-3 VIEWS: CPT | Performed by: PHYSICIAN ASSISTANT

## 2022-04-22 RX ORDER — DICLOFENAC SODIUM 10 MG/G
4 GEL TOPICAL 4 TIMES DAILY
Qty: 100 G | Refills: 2 | Status: SHIPPED | OUTPATIENT
Start: 2022-04-22 | End: 2022-08-08

## 2022-04-22 NOTE — PROGRESS NOTES
Patient: Alvina Licea                MRN: [id number]       SSN: [id number]  YOB: 1985        AGE: 30 y.o. SEX: female          PCP: Teddy Bass  04/22/22 4/22/2022: Patient returns for follow-up regarding her recent interarticular cortisone injection for left hip osteoarthritic symptoms. She is done very well from the injection. She has almost no pain to her left hip. She is also aggressively modifying her diet decreasing sodas and watching her calorie and carbohydrate intake. She today complains of right hip pain similar to that which was found in the left hip on the initial evaluation with our office. She has no bowel or bladder incontinence. No back pain reported. Pain in the right hip limits her ability when she first steps from a sitting position outward to walk or sits from a standing position. Pain on the right side prevents her from laying on her right hip. No history of trauma reported to her right hip. Chief Complaint   Patient presents with    Hip Pain     follow up eval - bilateral hip        HISTORY:  Alvina Licea is a 30 y.o. female returns to the office with a complaint of left hip acute on pain. She was seen and treated under the care of Dr. Pina Salinas for trochanteric bursal symptoms of the left hip late last year. The injection only lasted about 2 to 3 weeks. She was told at that time by Dr. Jazmine Vaughn that she did have osteoarthritic findings of the left hip and would benefit with a aggressive weight loss strategy. Pain is intermittent associated with the left outer hip and near the trochanteric region however of recent patients pain into the crotch/growing has increased significantly. She went on a car ride to [place] over the past week and had a miserable time sitting with pain moderate to severe radiating into her crotch. She denies any bowel or bladder incontinence. No radicular pain reported in lower extremities. Associated with the left trochanteric region pain starts over the upper portion of her left thigh laterally and extends down to just above her left knee laterally. Tylenol and Motrin have been minimally successful for symptom management. Pain Assessment  4/22/2022   Location of Pain Hip   Location Modifiers Left;Right   Severity of Pain 4   Quality of Pain Grinding;Aching   Quality of Pain Comment -   Duration of Pain Persistent   Frequency of Pain Constant   Date Pain First Started -   Date Pain First Started Comment -   Aggravating Factors Bending;Squatting;Stairs; Walking   Aggravating Factors Comment -   Limiting Behavior -   Relieving Factors Rest;Elevation   Relieving Factors Comment -   Result of Injury -           Lab Results   Component Value Date/Time    Hemoglobin A1c 6.4 (H) 09/28/2021 12:00 AM     Weight Metrics 4/22/2022 3/8/2022 11/18/2021 10/29/2021 8/16/2018 7/13/2018 4/3/2018   Weight 274 lb 9.6 oz 274 lb 272 lb 271 lb 253 lb 248 lb 248 lb 12.8 oz   BMI 47.13 kg/m2 50.12 kg/m2 49.75 kg/m2 49.57 kg/m2 46.27 kg/m2 45.36 kg/m2 44.21 kg/m2            Problem List Items Addressed This Visit     None      Visit Diagnoses     Primary osteoarthritis of left hip    -  Primary    Right hip pain        Relevant Orders    AMB POC X-RAY RADEX HIP UNI WITH PELVIS 2-3 VIEWS          PAST MEDICAL HISTORY:   Past Medical History:   Diagnosis Date    Deviated septum 2012    Multiple nasal polyps 2012    removed        PAST SURGICAL HISTORY:   Past Surgical History:   Procedure Laterality Date    HX TONSILLECTOMY  2008    HX WISDOM TEETH EXTRACTION  2010       ALLERGIES:   Allergies   Allergen Reactions    Asa-Acetaminophen-Caff-Buffers Anaphylaxis     Aspirin portion        CURRENT MEDICATIONS:  A list of medications prior to the time of admission include:  Prior to Admission medications    Medication Sig Start Date End Date Taking?  Authorizing Provider ergocalciferol (ERGOCALCIFEROL) 1,250 mcg (50,000 unit) capsule TAKE 1 CAPSULE BY MOUTH EVERY 7 DAYS 4/6/22  Yes Grady Tenorio PA-C   diclofenac (VOLTAREN) 1 % gel Apply 4 g to affected area four (4) times daily. Apply to proximal lateral hip topically 4 times a day 4 g 3/8/22  Yes Sarabjit Post PA-C   fluticasone propionate (FLONASE) 50 mcg/actuation nasal spray 2 sprays each nostril nightly. 7/16/21  Yes Grady Tenorio PA-C   sertraline (ZOLOFT) 50 mg tablet Take 1 Tablet by mouth daily. 7/16/21  Yes Brook Tenorio PA-C   levonorgestreL (MIRENA) 20 mcg/24 hours (5 yrs) 52 mg IUD 1 Device by IntraUTERine route once. Yes Provider, Historical       FAMILY HISTORY:   Family History   Problem Relation Age of Onset    Diabetes Mother        SOCIAL HISTORY:   Social History     Socioeconomic History    Marital status:     Number of children: 1    Years of education: 15   Occupational History    Occupation: none     Employer: NOT EMPLOYED   Tobacco Use    Smoking status: Never Smoker    Smokeless tobacco: Never Used   Vaping Use    Vaping Use: Never used   Substance and Sexual Activity    Alcohol use: No     Alcohol/week: 0.0 standard drinks    Drug use: No    Sexual activity: Yes     Partners: Male     Birth control/protection: Condom   Other Topics Concern     Service No    Blood Transfusions No    Caffeine Concern No    Occupational Exposure No    Hobby Hazards No    Sleep Concern No    Stress Concern No    Weight Concern Yes    Special Diet No    Back Care Yes    Exercise No    Bike Helmet No    Seat Belt Yes    Self-Exams Yes     Comment: 3 months       ROS:No CP, No SOB, No fever/chills nor night sweats. No headaches, vision abnormalities to include double and or loss of vision. No dizziness. No hearing abnormalities. No Chest Pain nor Shortness of breath. Pt denies h/o spinal surgery, injections, or PT/chiropractor.  Patient has attempted self treatment with less than adequate relief on oral and topical analgesic / anti inflammatory medications . Pt denies change in bowel or bladder habits. No saddle paresthesia / anesthesia. Pt denies fever, unplanned weight loss / weight gains, and no skin changes. Musculoskeletal pain per HPI. Pain is exacerbated positionally. PHYSICAL EXAM:    Visit Vitals  Pulse 96   Temp 97.1 °F (36.2 °C) (Temporal)   Ht 5' 4\" (1.626 m)   Wt 274 lb 9.6 oz (124.6 kg)   SpO2 96%   BMI 47.13 kg/m²       Constitutional: Appears well-developed and well-nourished. No distress. Sitting comfortably in the exam room, interacting with conversation with pleasant affect. Gait appears steady and patient exhibits no evidence of ataxia. Patient is able to ambulate with caution. No focal neurological deficit noted. No facial droop, slurred speech, or evidence of altered mentation noted on exam.   Skin: Skin over the head, neck, bilateral limbs, and trunk is warm and dry. No rash or erythema noted. Cranial Nerves II-XII grossly intact  HENT: NC/AT. Normal symmetry, bulk and tone of facial and neck musculature. Trachea midline. No discernible thyromegaly or masses. No involuntary movements. Lymphatic: No preauricular, submandibuar, anterior or posterior cervical lymphadenopathy. Psychiatric: The patient is awake, alert, and oriented to person, place and time. Behavior is normal. Thought content normal.   Cardiovascular: No clubbing, cyanosis. No edema bilateral lower extremities. Pulmonary: No tripoding nor accessory muscle recruitment. Breathing normally, no distress, no audible wheezing. Distal cap refill intact at 2/2 Pio UE / LE. Neuro intact Pio UE/LE to noxious stimuli        Ortho Specific exam:    Patient has an antalgic gait with a slight limp to the right. .        Hip flexor weakness noted on the right compared to the left.     Patient sitting at bedside right knee flexed at 90 degrees passive internal/external rotation reproduces pain in the groin on the right with a noted terminal point in both planes of motion of 12 and 15 degrees. Modest weakness in the left hip flexors against resistance today noted at 4-/5. Quad and femoral nerve activity full right lower extremity. Right hip IT band nontender generally throughout. Diane Rosas [facility] 3/8/2022 space AP pelvis and right hip reveals osteoarthritic findings noted in the femoral acetabular joint space. There is early spurring seen at the superior rim of the acetabulum and less but still present though at the inferior rim. No lytic or blastic lesions noted. No soft tissue ossifications noted. No fracture deformity seen. Gynecological device noted associated with the cervix IUD. IMPRESSION:      ICD-10-CM ICD-9-CM    1. Primary osteoarthritis of left hip  M16.12 715.15    2. Right hip pain  M25.551 719.45 AMB POC X-RAY RADEX HIP UNI WITH PELVIS 2-3 VIEWS   3. BMI 47.3  4. Decreased range of motion right hip secondary to above  . PLAN: Today I am recommending a low-dose cortisone injection interarticular through the inpatient radiological services DR[facility]. Patient may continue her Voltaren gel. If she fails to improve from the right hip a further detailed exam may be necessary to evaluate her lumbar sacral spine. Today all of her questions answered to her satisfaction. Copy of her x-rays reviewed and provided. .  That was sent to the pharmacy on file. Additionally today we discussed the diagnosis of obesity and the importance of weight management for both cardiovascular health. The patient was recommended to decrease carbohydrate and sugar intake. Patient recommended a formal dietary consult which they will consider and return a call to our office.   In light of the patient's osteoarthritic findings I am making a recommendation for aerobic exercise to include but not limited to stationary bicycle, elliptical, therapeutic walking with good shoes and or swimming. Patient should avoid any running or jumping. If using the treadmill then recommendation for no elevation and no running or jogging. Care plan outlined and precautions discussed. Results were reviewed with the patient. All medications were reviewed with the patient. All of pt's questions and concerns were addressed. Alarm symptoms and return precautions associated with chief complaint and evaluation were reviewed with the patient in detail. The patient demonstrated adequate understanding. The patient expresses willing compliance with the treatment plan. No Narcotic indicated today. Patient given pain medication for short term acute pain relief. Goal is to treat patient according to above plan and to ultimately have patient off all pain medications once appropriate. If chronic pain management is required beyond what is expected for current orthopedic problem, will refer patient to pain management.  was reviewed and will be reviewed with every medication refill request.         Patient provided a reminder for a \"due or due soon\" health maintenance. I have asked the patient to schedule an appointment with their primary care provider for follow-up on general health maintenance concerns. Today all the patient's questions were answered to their satisfaction. Copies of x-rays reviewed if obtained this visit, and provided to patient. Dictation disclaimer:  Please note that this dictation was completed with [id number], the computer voice recognition software. Quite often unanticipated grammatical, syntax, homophones, and other interpretive errors are inadvertently transcribed by the computer software. Please disregard these errors. Please excuse any errors that have escaped final proofreading. Markie MAY, APC, MPAS, PA-C  [facility]

## 2022-04-27 ENCOUNTER — TELEPHONE (OUTPATIENT)
Dept: ORTHOPEDIC SURGERY | Age: 37
End: 2022-04-27

## 2022-04-27 DIAGNOSIS — M16.11 ARTHRITIS OF RIGHT HIP: ICD-10-CM

## 2022-04-27 DIAGNOSIS — M25.551 RIGHT HIP PAIN: Primary | ICD-10-CM

## 2022-04-27 NOTE — TELEPHONE ENCOUNTER
Patient states she called  to see if she could schedule the intraarticular injection for the right hip, but they report they do not have the order. This is mentioned in the office visit note from 04/22/22.     Patient [phone]

## 2022-05-09 ENCOUNTER — HOSPITAL ENCOUNTER (OUTPATIENT)
Dept: GENERAL RADIOLOGY | Age: 37
Discharge: HOME | End: 2022-05-09
Attending: PHYSICIAN ASSISTANT
Payer: MEDICAID

## 2022-05-09 DIAGNOSIS — M25.551 RIGHT HIP PAIN: ICD-10-CM

## 2022-05-09 DIAGNOSIS — M16.11 ARTHRITIS OF RIGHT HIP: ICD-10-CM

## 2022-05-09 PROCEDURE — 74011000250 HC RX REV CODE- 250: Performed by: PHYSICIAN ASSISTANT

## 2022-05-09 PROCEDURE — 74011000636 HC RX REV CODE- 636: Performed by: PHYSICIAN ASSISTANT

## 2022-05-09 PROCEDURE — 20610 DRAIN/INJ JOINT/BURSA W/O US: CPT

## 2022-05-09 PROCEDURE — 74011250636 HC RX REV CODE- 250/636: Performed by: PHYSICIAN ASSISTANT

## 2022-05-09 RX ORDER — TRIAMCINOLONE ACETONIDE 40 MG/ML
40 INJECTION, SUSPENSION INTRA-ARTICULAR; INTRAMUSCULAR
Status: COMPLETED | OUTPATIENT
Start: 2022-05-09 | End: 2022-05-09

## 2022-05-09 RX ORDER — LIDOCAINE HYDROCHLORIDE 10 MG/ML
5 INJECTION, SOLUTION EPIDURAL; INFILTRATION; INTRACAUDAL; PERINEURAL
Status: COMPLETED | OUTPATIENT
Start: 2022-05-09 | End: 2022-05-09

## 2022-05-09 RX ADMIN — IOPAMIDOL 4 ML: 408 INJECTION, SOLUTION INTRATHECAL at 13:37

## 2022-05-09 RX ADMIN — LIDOCAINE HYDROCHLORIDE 5 ML: 10 INJECTION, SOLUTION EPIDURAL; INFILTRATION; INTRACAUDAL; PERINEURAL at 13:34

## 2022-05-09 RX ADMIN — TRIAMCINOLONE ACETONIDE 40 MG: 40 INJECTION, SUSPENSION INTRA-ARTICULAR; INTRAMUSCULAR at 13:40

## 2022-05-20 ENCOUNTER — OFFICE VISIT (OUTPATIENT)
Dept: ORTHOPEDIC SURGERY | Age: 37
End: 2022-05-20
Payer: MEDICAID

## 2022-05-20 VITALS
TEMPERATURE: 96.8 F | OXYGEN SATURATION: 97 % | BODY MASS INDEX: 49.5 KG/M2 | RESPIRATION RATE: 18 BRPM | HEART RATE: 104 BPM | WEIGHT: 269 LBS | HEIGHT: 62 IN

## 2022-05-20 DIAGNOSIS — M70.62 TROCHANTERIC BURSITIS, LEFT HIP: ICD-10-CM

## 2022-05-20 DIAGNOSIS — M16.10 ARTHRITIS, HIP: ICD-10-CM

## 2022-05-20 DIAGNOSIS — M76.32 IT BAND SYNDROME, LEFT: ICD-10-CM

## 2022-05-20 DIAGNOSIS — M25.652 DECREASED RANGE OF LEFT HIP MOVEMENT: ICD-10-CM

## 2022-05-20 DIAGNOSIS — M25.552 ACUTE HIP PAIN, LEFT: Primary | ICD-10-CM

## 2022-05-20 PROCEDURE — 99213 OFFICE O/P EST LOW 20 MIN: CPT | Performed by: PHYSICIAN ASSISTANT

## 2022-05-20 PROCEDURE — 20610 DRAIN/INJ JOINT/BURSA W/O US: CPT | Performed by: PHYSICIAN ASSISTANT

## 2022-05-20 RX ORDER — TRIAMCINOLONE ACETONIDE 40 MG/ML
40 INJECTION, SUSPENSION INTRA-ARTICULAR; INTRAMUSCULAR ONCE
Status: COMPLETED | OUTPATIENT
Start: 2022-05-20 | End: 2022-05-20

## 2022-05-20 RX ADMIN — TRIAMCINOLONE ACETONIDE 40 MG: 40 INJECTION, SUSPENSION INTRA-ARTICULAR; INTRAMUSCULAR at 10:19

## 2022-05-20 NOTE — PROGRESS NOTES
Patient: Pascual Tidwell                MRN: [id number]       SSN: [id number]  YOB: 1985        AGE: 30 y.o. SEX: female          PCP: Val Juárez  05/20/22    Chief Complaint   Patient presents with    Hip Pain     right hip pain       HISTORY:  Pascual Tidwell is a 30 y.o. female returns to the office with complaint of left outer hip pain with radiation down to the outer left knee. She was seen previously for right hip osteoarthritic symptoms and ordered a interarticular cortisone injection which significantly improved her right hip discomfort. She has no pain in the right hip today. She also completed a course of physical therapy associated with strengthening of her quad and hamstrings of the lower extremities. Pain in the left hip has been near constant with difficulty laying on her left side, standing from a sitting position, and sitting from a standing position. Pain is a nagging sensation which has not responded to over-the-counter analgesic/anti-inflammatory medications. No trauma reported.       Pain Assessment  5/20/2022   Location of Pain Hip   Location Modifiers Right   Severity of Pain 0   Quality of Pain -   Quality of Pain Comment -   Duration of Pain -   Frequency of Pain -   Date Pain First Started -   Date Pain First Started Comment -   Aggravating Factors -   Aggravating Factors Comment -   Limiting Behavior -   Relieving Factors -   Relieving Factors Comment -   Result of Injury No           Lab Results   Component Value Date/Time    Hemoglobin A1c 6.4 (H) 09/28/2021 12:00 AM     Weight Metrics 5/20/2022 4/22/2022 3/8/2022 11/18/2021 10/29/2021 8/16/2018 7/13/2018   Weight 269 lb 274 lb 9.6 oz 274 lb 272 lb 271 lb 253 lb 248 lb   BMI 49.2 kg/m2 47.13 kg/m2 50.12 kg/m2 49.75 kg/m2 49.57 kg/m2 46.27 kg/m2 45.36 kg/m2            Problem List Items Addressed This Visit     None      Visit Diagnoses Acute hip pain, left    -  Primary    Relevant Medications    triamcinolone acetonide (KENALOG-40) 40 mg/mL injection 40 mg (Start on 5/20/2022 11:00 AM)    Other Relevant Orders    DRAIN/INJECT LARGE JOINT/BURSA    Trochanteric bursitis, left hip        Decreased range of left hip movement        It band syndrome, left        Arthritis, hip              PAST MEDICAL HISTORY:   Past Medical History:   Diagnosis Date    Deviated septum 2012    Multiple nasal polyps 2012    removed        PAST SURGICAL HISTORY:   Past Surgical History:   Procedure Laterality Date    HX TONSILLECTOMY  2008    HX WISDOM TEETH EXTRACTION  2010       ALLERGIES:   Allergies   Allergen Reactions    Asa-Acetaminophen-Caff-Buffers Anaphylaxis     Aspirin portion        CURRENT MEDICATIONS:  A list of medications prior to the time of admission include:  Prior to Admission medications    Medication Sig Start Date End Date Taking? Authorizing Provider   diclofenac (VOLTAREN) 1 % gel Apply 4 g to affected area four (4) times daily. Apply to proximal lateral hip topically 4 times a day 4 g 4/22/22  Yes Sarabjit Post PA-C   ergocalciferol (ERGOCALCIFEROL) 1,250 mcg (50,000 unit) capsule TAKE 1 CAPSULE BY MOUTH EVERY 7 DAYS 4/6/22  Yes Kandace Tenorio PA-C   fluticasone propionate (FLONASE) 50 mcg/actuation nasal spray 2 sprays each nostril nightly. 7/16/21  Yes Kandace Tenorio PA-C   sertraline (ZOLOFT) 50 mg tablet Take 1 Tablet by mouth daily. 7/16/21  Yes Ailin Tenorio PA-C   levonorgestreL (MIRENA) 20 mcg/24 hours (5 yrs) 52 mg IUD 1 Device by IntraUTERine route once.    Yes Provider, Historical       FAMILY HISTORY:   Family History   Problem Relation Age of Onset    Diabetes Mother        SOCIAL HISTORY:   Social History     Socioeconomic History    Marital status:     Number of children: 1    Years of education: 15   Occupational History    Occupation: none     Employer: NOT EMPLOYED   Tobacco Use    Smoking status: Never Smoker    Smokeless tobacco: Never Used   Vaping Use    Vaping Use: Never used   Substance and Sexual Activity    Alcohol use: No     Alcohol/week: 0.0 standard drinks    Drug use: No    Sexual activity: Yes     Partners: Male     Birth control/protection: Condom   Other Topics Concern     Service No    Blood Transfusions No    Caffeine Concern No    Occupational Exposure No    Hobby Hazards No    Sleep Concern No    Stress Concern No    Weight Concern Yes    Special Diet No    Back Care Yes    Exercise No    Bike Helmet No    Seat Belt Yes    Self-Exams Yes     Comment: 3 months       ROS:No CP, No SOB, No fever/chills nor night sweats. No headaches, vision abnormalities to include double and or loss of vision. No dizziness. No hearing abnormalities. No Chest Pain nor Shortness of breath. Pt denies h/o spinal surgery, injections, or PT/chiropractor. Patient has attempted self treatment with less than adequate relief on oral and topical analgesic / anti inflammatory medications . Pt denies change in bowel or bladder habits. No saddle paresthesia / anesthesia. Pt denies fever, unplanned weight loss / weight gains, and no skin changes. Musculoskeletal pain per HPI. Pain is exacerbated positionally. PHYSICAL EXAM:    Visit Vitals  Pulse (!) 104   Temp 96.8 °F (36 °C) (Temporal)   Resp 18   Ht 5' 2\" (1.575 m)   Wt 269 lb (122 kg)   SpO2 97%   BMI 49.20 kg/m²       Constitutional: Appears well-developed and well-nourished. No distress. Sitting comfortably in the exam room, interacting with conversation with pleasant affect. Gait appears steady and patient exhibits no evidence of ataxia. Patient is able to ambulate with caution. No focal neurological deficit noted. No facial droop, slurred speech, or evidence of altered mentation noted on exam.   Skin: Skin over the head, neck, bilateral limbs, and trunk is warm and dry. No rash or erythema noted.    Cranial Nerves II-XII grossly intact  HENT: NC/AT. Normal symmetry, bulk and tone of facial and neck musculature. Trachea midline. No discernible thyromegaly or masses. No involuntary movements. Lymphatic: No preauricular, submandibuar, anterior or posterior cervical lymphadenopathy. Psychiatric: The patient is awake, alert, and oriented to person, place and time. Behavior is normal. Thought content normal.   Cardiovascular: No clubbing, cyanosis. No edema bilateral lower extremities. Pulmonary: No tripoding nor accessory muscle recruitment. Breathing normally, no distress, no audible wheezing. Distal cap refill intact at 2/2 Pio UE / LE. Neuro intact Pio UE/LE to noxious stimuli        Ortho Specific exam:    Joined by [facility] LPN as my chaperone patient was placed in a right recumbent position and left hip draped appropriately to reveal skin. No warmth, erythema, edema, or ecchymosis. No effusions noted. Point tenderness noted over the greater trochanteric region with radiation along the IT band 4 cm proximal and 10 cm distal.      Quad and femoral nerve activity full left lower extremity. Passive internal or external rotation of the left hip 15 and 18 degrees without pain or guarding. Hip abduction against resistance reproduce pain over the greater trochanteric region. [facility] pelvis reveals moderate spurring seen at the superior rim of the acetabulum on the left and less on the inferior rim acetabulum. There is femoral acetabular joint space narrowing of the inferior portion of the joint. No lytic or blastic lesions. No soft tissue ossifications. IMPRESSION:      ICD-10-CM ICD-9-CM    1. Acute hip pain, left  M25.552 719.45 triamcinolone acetonide (KENALOG-40) 40 mg/mL injection 40 mg      DRAIN/INJECT LARGE JOINT/BURSA   2. Trochanteric bursitis, left hip  M70.62 726.5    3. Decreased range of left hip movement  M25.652 719.55    4.  It band syndrome, left  M76.32 728.89    5. Arthritis, hip  M16.10 716.95    6. Obesity BMI 49.20    PLAN: Today we discussed treatment options to include but not limited to a low-dose cortisone injection for her IT band syndrome as well as following physical therapy with specific trochanteric bursal stretching. Patient also was discussed weight loss strategies since she is carrying a BMI of 49.20. Weight loss strategies as below. Patient to follow with our office in 1 month. Prior x-ray copy reviewed and provided to patient. Procedural: Using sterile technique and verbal and written consent obtained appropriate timeout formed patient laying right recumbent with left hip draped appropriate to reveal skin at the point of maximal tenderness consistent with the greater trochanteric region 1 cc of Kenalog at 40 mg/mL mixed with 7 mils of Sensorcaine 0.75% injected. There were no complications. Patient tolerated the procedure well. Chart reviewed for the following:   Bebe Post PA-C, have reviewed the History, Physical and updated the Allergic reactions for Devon L Omari    TIME OUT performed immediately prior to start of procedure:   IFanta PA-C, have performed the following reviews on Devon L Omari prior to the start of the procedure:            * Patient was identified by name and date of birth   * Agreement on procedure being performed was verified  * Risks and Benefits explained to the patient  * Procedure site verified and marked as necessary  * Patient was positioned for comfort  * Consent was signed and verified             Date of procedure: 05/20/22    Time: 10:22 AM    Procedure performed by:  Reji Kelly PA-C    Provider assisted by: None     Patient assisted by: self    How tolerated by patient: tolerated the procedure well with no complications    Comments: none             Additionally today we discussed the diagnosis of obesity and the importance of weight management for both cardiovascular health. The patient was recommended to decrease carbohydrate and sugar intake. Patient recommended a formal dietary consult which they will consider and return a call to our office. In light of the patient's osteoarthritic findings I am making a recommendation for aerobic exercise to include but not limited to stationary bicycle, elliptical, therapeutic walking with good shoes and or swimming. Patient should avoid any running or jumping. If using the treadmill then recommendation for no elevation and no running or jogging. Care plan outlined and precautions discussed. Results were reviewed with the patient. All medications were reviewed with the patient. All of pt's questions and concerns were addressed. Alarm symptoms and return precautions associated with chief complaint and evaluation were reviewed with the patient in detail. The patient demonstrated adequate understanding. The patient expresses willing compliance with the treatment plan. Special note: Medication management discussed in detail all patient's questions answered to their satisfaction. No Narcotic indicated today. Patient given pain medication for short term acute pain relief. Goal is to treat patient according to above plan and to ultimately have patient off all pain medications once appropriate. If chronic pain management is required beyond what is expected for current orthopedic problem, will refer patient to pain management.  was reviewed and will be reviewed with every medication refill request.         Patient provided a reminder for a \"due or due soon\" health maintenance. I have asked the patient to schedule an appointment with their primary care provider for follow-up on general health maintenance concerns. Today all the patient's questions were answered to their satisfaction. Copies of x-rays reviewed if obtained this visit, and provided to patient.           Dictation disclaimer: Please note that this dictation was completed with BiiCode, the computer voice recognition software. Quite often unanticipated grammatical, syntax, homophones, and other interpretive errors are inadvertently transcribed by the computer software. Please disregard these errors. Please excuse any errors that have escaped final proofreading. Ashkan MAY, APC, MPAS, PA-C  [facility]

## 2022-06-06 ENCOUNTER — HOSPITAL ENCOUNTER (OUTPATIENT)
Dept: PHYSICAL THERAPY | Age: 37
Discharge: HOME | End: 2022-06-06
Payer: MEDICAID

## 2022-06-06 DIAGNOSIS — M70.62 TROCHANTERIC BURSITIS, LEFT HIP: ICD-10-CM

## 2022-06-06 PROCEDURE — 97161 PT EVAL LOW COMPLEX 20 MIN: CPT

## 2022-06-06 NOTE — PROGRESS NOTES
[facility] PHYSICAL THERAPY  [address] [place] [address] [place] T[place], [address] - Phone: [phone]  Fax: [phone] / [facility]  Patient Name: Pura Friday : 1985   Medical   Diagnosis: Trochanteric bursitis, left hip [M70.62] Treatment Diagnosis: L ITB syndrome, trochanteric bursitis    Onset Date: 2022     Referral Source: [facility]): 2022   Prior Hospitalization: See medical history Provider #: [id number]   Prior Level of Function: Independent    Comorbidities: Obesity, OA, LBP, depression    Medications: Verified on Patient Summary List   The Plan of Care and following information is based on the information from the initial evaluation.   ==========================================================================================    Pt is a 30year old female who presents to PT today with c/o left leg pain. DOI: chronic with exacerbation in April. JUANITA: nothing specific although she admits to wearing heels more frequently in April. Pt was previously seen and treated for similar diagnosis last year, she reports success with PT. Pt had injection to L hip in early May which reduced pain levels. Scheduled for second injection in July. Symptoms extend along entire lateral left thigh to knee. Aggravating factors include squatting and standing still >45 minutes. Alleviating factors include laying down with pillow between knees and walking.      Symptoms  Pain              Today:1-2/10              Best:1/10              Worst:7/10    ROM / Strength  []? Unable to assess                  Strength                 AROM      Left Right Left Right   Hip Flexion 5- 5-         Extension 4- 4-         Abduction 4 4         Int. rotation 4 4 40 40     Ext.  rotation 4 4 30 40   Knee Flexion 5 5         Extension 5 5          Flexibility:   Ely's Test: positive bilaterally Hamstrings: 90* RLE, approx 75* LLE    Patient with a Functional Status score of 58 on FOTO (Focused on Therapeutic Outcomes), which corresponds to a functional limitation of 42%. Pt was provided a HEP today and educated regarding their diagnosis and prognosis. Clinically, patient's signs and symptoms are consistent with left trochanteric bursitis and ITB syndrome. Skilled physical therapy is indicated to address noted deficits. Rehabilitation will address limitations noted in evaluation, follow MD orders and work toward improving LLE strength, stability, and mobility so that patient may return to desired activities safely and with less discomfort.  Thank you for the opportunity to assist in this case.     ==========================================================================================  Eval Complexity: History: HIGH Complexity :3+ comorbidities / personal factors will impact the outcome/ POC Exam:LOW Complexity : 1-2 Standardized tests and measures addressing body structure, function, activity limitation and / or participation in recreation  Presentation: LOW Complexity : Stable, uncomplicated  Clinical Decision Making:MEDIUM Complexity : FOTO score of 26-74Overall Complexity:LOW     Problem List: pain affecting function, decrease ROM, decrease strength, impaired gait/ balance, decrease ADL/ functional abilitiies, decrease activity tolerance, decrease flexibility/ joint mobility and decrease transfer abilities   Treatment Plan may include any combination of the following: Therapeutic exercise, Therapeutic activities, Neuromuscular re-education, Physical agent/modality, Gait/balance training, Manual therapy, Patient education, Self Care training, Functional mobility training, Home safety training and Stair training  Patient / Family readiness to learn indicated by: asking questions, trying to perform skills and interest  Persons(s) to be included in education: patient (P)  Barriers to Learning/Limitations: None  Patient Goal (s): \"To become more limber and stronger in my left leg. \"   Patient self reported health status: good  Rehabilitation Potential: excellent       Short Term Goals: To be accomplished in  3  weeks:  Goal/Measure of Progress Goal Met? 1. Pt will be compliant with HEP for symptom management at home. Status at last Eval: NA Current Status: Unable n/a   2. Patient will self report reduction of pain level at worst to 3/10 to allow for improved sitting tolerance. Status at last Eval: NA Current Status: 7/10 n/a      Long Term Goals: To be accomplished in  6  weeks:  Goal/Measure of Progress Goal Met? 1. Pt will be independent with HEP at D/C for self management. Status at last Eval: NA Current Status: Unable n/a   2. Patient will demonstrate improved AROM left hip ER to 40* to allow for improved transition into/out of car. Status at last Eval: NA Current Status: 30* n/a   3. Patient will demonstrate improved bilateral hip extension strength to 4+/5 to allow for improved walking tolerance. Status at last Eval: NA Current Status: 4-/5 n/a   4. Patient will increase FOTO Functional Status score to 68 to decrease functional limitations. Status at last Eval: NA Current Status: 58 n/a       Frequency / Duration:   Patient to be seen  2  times per week for 6  weeks:  Patient / Caregiver education and instruction: provided education regarding diagnosis and prognosis as well as details regarding treatment plain. self care, activity modification and exercises  Therapist Signature: Codey Adorno PT, DPT Date: 1/0/8729   Certification Period: na Time: 11:53 AM   ===========================================================================================  I certify that the above Physical Therapy Services are being furnished while the patient is under my care. I agree with the treatment plan and certify that this therapy is necessary.     Physician Signature: Date:       Time:     Please sign and return to [facility] or you may fax the signed copy to [phone]. Thank you.   Wanda Pearson PA-C

## 2022-06-06 NOTE — PROGRESS NOTES
PHYSICAL THERAPY - DAILY TREATMENT NOTE    Patient Name: Alvina Licea        Date: 2022  : 1985   YES Patient  Verified  Visit #:     Insurance: [facility] [place] [facility] / Plan: [address] [facility] / Product Type: Managed Care Medicaid /      In time: 11:00 Out time: 11:35   Total Treatment Time: 35     Medicare Time Tracking (below)   Total Timed Codes (min):  0 1:1 Treatment Time:  0     TREATMENT AREA = Trochanteric bursitis, left hip [M70.62]    SUBJECTIVE    Pain Level (on 0 to 10 scale):  1-2  / 10   Medication Changes/New allergies or changes in medical history, any new surgeries or procedures? YES    If yes, update Summary List   Subjective Functional Status/Changes:  []  No changes reported     Pt is a 30year old female who presents to PT today with c/o left leg pain. DOI: chronic with exacerbation in April. JUANITA: nothing specific although she admits to wearing heels more often in April. Pt was previously seen and treated for similar diagnosis last year, she reports success with PT. Pt had injection to L hip in early May which reduced pain levels. Scheduled for second injection in July. Symptoms extend along entire lateral left thigh to knee. Aggravating factors include squatting and standing still >45 minutes. Alleviating factors include laying down with pillow between knees and walking. Symptoms  Pain   Today:1-2/10   Best:1/10   Worst:7/10    X-rays taken bilateral hips, she reports more OA L>R        OBJECTIVE    Palpation: TTP to left greater trochanter, ITB, gluteus medius     Gait:unremarkable    ROM / Strength  [] Unable to assess                  Strength                 AROM    Left Right Left Right   Hip Flexion 5- 5-      Extension 4- 4-      Abduction 4 4      Int. rotation 4 4 40 40    Ext.  rotation 4 4 30 40   Knee Flexion 5 5      Extension 5 5       Flexibility:   Ely's Test: positive bilaterally   Hamstrings: 90* RLE, approx 75* LLE Therapeutic Procedures:  Min Procedure Specifics + Rationale   n/a [x]  Patient Education (performed throughout session) [x] Review HEP    [] Progressed/Changed HEP based on:   [] proper performance and advancement of Therex/TA   [] reduction in pain level    [] increased functional capacity       [] change in directional preference       Post Treatment Pain Level (on 0 to 10) scale:   1-2  / 10     ASSESSMENT    Assessment/Changes in Function: Clinically, patient's signs and symptoms are consistent with LLE pain secondary to trochanteric bursitis and ITB syndrome. Skilled physical therapy is indicated to address noted deficits. Rehabilitation will address limitations noted in evaluation, follow MD orders and work toward improving LLE strength, stability, and mobility so that patient may return to desired activities safely and with less discomfort.      Justification for Eval Code Complexity: low  Patient History (low 0, mod 1-2, high 3-4): high   Examination (low 1-2, mod 3+, high 4+): low   Clinical Presentation (low: stable/uncomplicated; mod: evolving; high: unstable/unpredictable): low  Clinical Decision Making (low , mod 26-74, high 1-25): mod    [x]  See Plan of Care  []  See Progress Note/ Recertification  []  See Discharge Summary        Patient will continue to benefit from skilled PT services to modify and progress therapeutic interventions, address functional mobility deficits, address ROM deficits, address strength deficits, analyze and address soft tissue restrictions, analyze and cue movement patterns, analyze and modify body mechanics/ergonomics, and assess and modify postural abnormalities  to attain remaining goals   Progress toward goals / Updated goals:    See POC     PLAN    [x]  Upgrade activities as tolerated  [x]  Update interventions per flow sheet YES Continue plan of care   []  Discharge due to :    []  Other:      Therapist: Anthony Dean PT, DPT    Date: 6/6/2022 Time: 11:06 AM No future appointments.

## 2022-06-08 ENCOUNTER — HOSPITAL ENCOUNTER (OUTPATIENT)
Dept: PHYSICAL THERAPY | Age: 37
Discharge: HOME | End: 2022-06-08
Payer: MEDICAID

## 2022-06-08 PROCEDURE — 97530 THERAPEUTIC ACTIVITIES: CPT

## 2022-06-08 PROCEDURE — 97116 GAIT TRAINING THERAPY: CPT

## 2022-06-08 PROCEDURE — 97110 THERAPEUTIC EXERCISES: CPT

## 2022-06-08 PROCEDURE — 97112 NEUROMUSCULAR REEDUCATION: CPT

## 2022-06-08 NOTE — PROGRESS NOTES
PHYSICAL THERAPY - DAILY TREATMENT NOTE    Patient Name: Pascual Tidwell        Date: 2022  : 1985   yes Patient  Verified  Visit #:     Insurance: Payor: [address] / Plan: 231 [facility] / Product Type: Managed Care Medicaid /      In time: 705 Out time: 5679   Total Treatment Time: 58     Medicare/BCBS Time Tracking (below)   Total Timed Codes (min):  48 1:1 Treatment Time:  48     TREATMENT AREA =  Left hip pain [M25.552]    SUBJECTIVE  Pain Level (on 0 to 10 scale):  1  / 10   Medication Changes/New allergies or changes in medical history, any new surgeries or procedures?    no  If yes, update Summary List   Subjective Functional Status/Changes:  []  No changes reported     \"I am not bad today.  I been doing ok with my hips, I feel it go down my thigh and sometimes radiate to the side of the knee\"          OBJECTIVE  Modalities Rationale:     decrease inflammation and decrease pain to improve patient's ability to  safely perform ADLs/transfers/squatting/prolong stding and ambulation/stair negotiation with minimal or no c/o pain    10  [x]  Ice     []  Heat    location/position: Supine with wedge under B LEs   [x]Skin assessment post-treatment (if applicable):   [x]  intact    []  redness- no adverse reaction                  []redness - adverse reaction:      16 min Therapeutic Exercise:  [x]  See flow sheet   Rationale:      increase ROM, increase strength and improve coordination to improve the patients ability to  safely perform ADLs/transfers/squatting/prolong stding and ambulation/stair negotiation with minimal or no c/o pain      10 min Therapeutic Activity: [x]  See flow sheet   Rationale:    increase ROM and increase strength to improve the patients ability to  safely perform ADLs/transfers/squatting/prolong stding and ambulation/stair negotiation with minimal or no c/o pain      10 min Neuromuscular Re-ed: [x]  See flow sheet   Rationale:    increase ROM, increase strength and improve coordination to improve the patients ability to  safely perform ADLs/transfers/squatting/prolong stding and ambulation/stair negotiation with minimal or no c/o pain    12 min Gait Training:  Catrina Fwd/lateral  dynamic warm up: HK, Butt kicks, HS step, Frankenstein, Heel/Toe walking, lateral lunge + step   Rationale: To improve ambulation safety and efficiency in order to improve patient's ability to safely ambulate at home for self care. Billed With/As:   [x] TE   [x] TA   [x] Neuro   [] Self Care Patient Education: [x] Review HEP    [] Progressed/Changed HEP based on:   [x] positioning   [x] body mechanics   [x] transfers   [x] heat/ice application    [x] other: Pt ed on importance and benefits of compliance with HEP, core strength/stability and proper posture; pt verbalized understanding       Other Objective/Functional Measures:    VCs + demo to perform proper technique and for safety with TE  Initiated TE per flowsheet;  no c/o pain noted at this time  (I) with GT  demos symmetrical WBIng with sit <>stand without UE asisst     Post Treatment Pain Level (on 0 to 10) scale:   0  / 10     ASSESSMENT  Assessment/Changes in Function:   Progressed TE without c/o increase p!  demos proper p! free squat     []  See Progress Note/Recertification   Patient will continue to benefit from skilled PT services to modify and progress therapeutic interventions, address functional mobility deficits, address ROM deficits, address strength deficits, analyze and address soft tissue restrictions, analyze and cue movement patterns, analyze and modify body mechanics/ergonomics, assess and modify postural abnormalities and instruct in home and community integration to attain remaining goals.    Progress toward goals / Updated goals:  Pt's first visit since [facility], no noted progress        PLAN  [x]  Upgrade activities as tolerated yes Continue plan of care   []  Discharge due to :    []  Other: Therapist: Benjamin Gutierrez PTA    Date: 6/8/2022 Time: 9:59 AM     Future Appointments   Date Time Provider Karishma Marion   6/13/2022 11:00 AM Osmany Jara PTA [facility]   6/15/2022  2:00 PM [address] [facility]   6/20/2022 11:45 AM Kelsey Caceres [facility]   6/22/2022  1:15 PM Antoine Ross PTA [facility]   6/27/2022  8:00 AM Jing Hernandez [facility]   6/29/2022  8:45 AM Simon Lassiter PT [facility]

## 2022-06-13 ENCOUNTER — APPOINTMENT (OUTPATIENT)
Dept: PHYSICAL THERAPY | Age: 37
End: 2022-06-13
Payer: MEDICAID

## 2022-06-15 ENCOUNTER — HOSPITAL ENCOUNTER (OUTPATIENT)
Dept: PHYSICAL THERAPY | Age: 37
Discharge: HOME | End: 2022-06-15
Payer: MEDICAID

## 2022-06-15 PROCEDURE — 97116 GAIT TRAINING THERAPY: CPT

## 2022-06-15 PROCEDURE — 97110 THERAPEUTIC EXERCISES: CPT

## 2022-06-15 PROCEDURE — 97530 THERAPEUTIC ACTIVITIES: CPT

## 2022-06-15 PROCEDURE — 97112 NEUROMUSCULAR REEDUCATION: CPT

## 2022-06-15 NOTE — PROGRESS NOTES
PHYSICAL THERAPY - DAILY TREATMENT NOTE    Patient Name: Olga Marquez        Date: 6/15/2022  : 1985   YES Patient  Verified  Visit #:   3   of   12  Insurance: Payor: Patti Basilio / Plan: 231 [facility] / Product Type: Managed Care Medicaid /      In time: 2:02 Out time: 2:55   Total Treatment Time: 53     Medicare/BCBS [place] Time Tracking (below)   Total Timed Codes (min):  na 1:1 Treatment Time:  na     TREATMENT AREA =  Left hip pain [M25.552]    SUBJECTIVE    Pain Level (on 0 to 10 scale):  2  / 10   Medication Changes/New allergies or changes in medical history, any new surgeries or procedures? NO    If yes, update Summary List   Subjective Functional Status/Changes:  []  No changes reported     Pt reports her left hip was a little sore after last PT session, but not too bad. Pt states she is still working on the exercises from her previous PT. Pt reports now that school is over she isn't doing as much sitting in the car. OBJECTIVE    Modalities Rationale: decrease inflammation and decrease pain to improve patient's ability to  safely perform ADLs/transfers/squatting/prolong stding and ambulation/stair negotiation with minimal or no c/o pain   min [] Estim, type/location:                                      []  att     []  unatt     []  w/US     []  w/ice    []  w/heat    min []  Mechanical Traction: type/lbs                   []  pro   []  sup   []  int   []  cont    []  before manual    []  after manual    min []  Ultrasound, settings/location:      min []  Iontophoresis w/ dexamethasone, location:                                               []  take home patch       []  in clinic   10 min [x]  Ice     []  Heat    location/position: CP to left lateral hip, pt seated EOB.      min []  Vasopneumatic Device, press/temp:     min []  Other:    [x] Skin assessment post-treatment (if applicable):    [x]  intact    []  redness- no adverse reaction []redness - adverse reaction:      Therapeutic Procedures:  Min Procedure Specifics + Rationale   13 [x] Therapeutic Exercise   [x]  See Flowsheet   Rationale: increase ROM and increase strength to improve the patients ability to participate in ADL's    10 [x] Therapeutic Activity   [x]  See Flowsheet    Rationale: To improve safety, proprioception, coordination, and efficiency with tasks   12 [x] Neuromuscular Re-ed   [x]  See Flowsheet    Rationale: increase ROM, increase strength, improve coordination, improve balance and increase proprioception  to improve the patients ability to safely participate in ADL's   8 [x]  Gait Training   Rationale: Normalize gait, increase proprioceptive and kinesthetic awareness, coordination, balance      min Patient Education:  YES  Reviewed HEP   []  Progressed/Changed HEP based on:   Pt reports performing HEP from previous PT. Other Objective/Functional Measures:    Min VCing for form and hold time with LE stretches. Pt with c/o increased tightness left hip compared to right with piriformis and ITBand stretch. Added PPT for core stabilization. Mod VCing for form and proper breathing. Post Treatment Pain Level (on 0 to 10) scale:   1 / 10     ASSESSMENT    Assessment/Changes in Function:     Pt with good tolerance to initiation of therapeutic exercise. []  See Progress Note/Recertification   Patient will continue to benefit from skilled PT services to modify and progress therapeutic interventions, address functional mobility deficits, address ROM deficits, address strength deficits, analyze and address soft tissue restrictions, analyze and cue movement patterns, assess and modify postural abnormalities, and instruct in home and community integration to attain remaining goals. Progress toward goals / Updated goals:    Goal/Measure of Progress Goal Met? 1. Pt will be compliant with HEP for symptom management at home.    Status at last Eval: NA Current Status: Pt reports still working on previous PT  HEP n/a   2. Patient will self report reduction of pain level at worst to 3/10 to allow for improved sitting tolerance.     Status at last Eval: NA Current Status: 7/10 n/a           PLAN    []  Upgrade activities as tolerated YES Continue plan of care   []  Discharge due to :    []  Other:      Therapist: Alondra Fontanez PTA    Date: 6/15/2022 Time: 2:10 PM     Future Appointments   Date Time Provider Karishma Schultz   6/20/2022 11:45 AM [facility]   6/22/2022  1:15 PM Deepthi Perdue PTA [facility]   6/27[facility]2022  [facility]   6/29/2022  8:45 AM Henny Fontana, PT [facility]

## 2022-06-20 ENCOUNTER — HOSPITAL ENCOUNTER (OUTPATIENT)
Dept: PHYSICAL THERAPY | Age: 37
Discharge: HOME | End: 2022-06-20
Payer: MEDICAID

## 2022-06-20 PROCEDURE — 97110 THERAPEUTIC EXERCISES: CPT

## 2022-06-20 PROCEDURE — 97112 NEUROMUSCULAR REEDUCATION: CPT

## 2022-06-20 PROCEDURE — 97116 GAIT TRAINING THERAPY: CPT

## 2022-06-20 NOTE — PROGRESS NOTES
PHYSICAL THERAPY - DAILY TREATMENT NOTE    Patient Name: Olga Marquez        Date: 2022  : 1985   YES Patient  Verified  Visit #:     Insurance: Payor: Patti Basilio / Plan: [address] [facility] / Product Type: Managed Care Medicaid /      In time: 11:50 Out time: 12:29   Total Treatment Time: 39     Medicare/BCBS [place] Time Tracking (below)   Total Timed Codes (min):  na 1:1 Treatment Time:  na     TREATMENT AREA =  Left hip pain [M25.552]    SUBJECTIVE    Pain Level (on 0 to 10 scale):  1  / 10   Medication Changes/New allergies or changes in medical history, any new surgeries or procedures? NO    If yes, update Summary List   Subjective Functional Status/Changes:  []  No changes reported     Pt reports not having much pain today, but had a bad day with pain yesterday. Pt reports 5-6/10 pain yesterday. Pt states she got into the pool and it felt good to move and walk around in the water. OBJECTIVE    Therapeutic Procedures:  Min Procedure Specifics + Rationale   14 [x] Therapeutic Exercise   [x]  See Flowsheet   Rationale: increase ROM and increase strength to improve the patients ability to participate in ADL's    15 [x] Neuromuscular Re-ed   [x]  See Flowsheet    Rationale: increase ROM, increase strength, improve coordination, improve balance and increase proprioception  to improve the patients ability to safely participate in ADL's   10 [x]  Gait Training   Rationale: Normalize gait, increase proprioceptive and kinesthetic awareness, coordination, balance        min Patient Education:  YES  Reviewed HEP   []  Progressed/Changed HEP based on:   Patient with no questions, continue same HEP. Pt reports doing bridges with HEP. Other Objective/Functional Measures: Added forward and lateral tap-ups for hip stabilization. Pt reports ms fatigue left>right hip. Pt demo's less stability with left SLS than right with therapeutic activity.      Added incline gastroc stretch. Post Treatment Pain Level (on 0 to 10) scale:   1  / 10     ASSESSMENT    Assessment/Changes in Function:     Pt with flare-up in hip symptoms yesterday for no known reason. []  See Progress Note/Recertification   Patient will continue to benefit from skilled PT services to modify and progress therapeutic interventions, address functional mobility deficits, address ROM deficits, address strength deficits, analyze and address soft tissue restrictions, analyze and cue movement patterns, assess and modify postural abnormalities, and instruct in home and community integration to attain remaining goals. Progress toward goals / Updated goals:    Goal/Measure of Progress Goal Met? 1. Pt will be compliant with HEP for symptom management at home. Status at last Eval: NA Current Status: Unable n/a   2. Patient will self report reduction of pain level at worst to 3/10 to allow for improved sitting tolerance.     Status at last Eval: NA Current Status: 5-6/10 pain yesterday  n/a           PLAN    []  Upgrade activities as tolerated YES Continue plan of care   []  Discharge due to :    []  Other:      Therapist: Kavita Calloway PTA    Date: 6/20/2022 Time: 11:56 AM     Future Appointments   Date Time Provider Karishma Schultz   6/22/2022  1:15 PM Gila Preston PTA [facility]   6/27/2022  8:00 AM [facility]   6/29/2022  8:45 AM [facility]

## 2022-06-22 ENCOUNTER — HOSPITAL ENCOUNTER (OUTPATIENT)
Dept: PHYSICAL THERAPY | Age: 37
Discharge: HOME | End: 2022-06-22
Payer: MEDICAID

## 2022-06-22 PROCEDURE — 97112 NEUROMUSCULAR REEDUCATION: CPT

## 2022-06-22 PROCEDURE — 97110 THERAPEUTIC EXERCISES: CPT

## 2022-06-22 PROCEDURE — 97530 THERAPEUTIC ACTIVITIES: CPT

## 2022-06-22 PROCEDURE — 97116 GAIT TRAINING THERAPY: CPT

## 2022-06-22 NOTE — PROGRESS NOTES
PHYSICAL THERAPY - DAILY TREATMENT NOTE    Patient Name: Marlon Puckett        Date: 2022  : 1985   yes Patient  Verified  Visit #:     Insurance: Payor: Chaparro Locke Chantelle / Plan: [address] [facility] / Product Type: Managed Care Medicaid /      In time: 120 Out time: 226   Total Treatment Time: 66     TREATMENT AREA =  Left hip pain [M25.552]    SUBJECTIVE  Pain Level (on 0 to 10 scale):  1  / 10   Medication Changes/New allergies or changes in medical history, any new surgeries or procedures?    no  If yes, update Summary List   Subjective Functional Status/Changes:  []  No changes reported     \"I am not bad today.  I been doing ok with my hips, I feel it go down my thigh and sometimes radiate to the side of the knee\"          OBJECTIVE  Modalities Rationale:     decrease inflammation and decrease pain to improve patient's ability to  safely perform ADLs/transfers/squatting/prolong stding and ambulation/stair negotiation with minimal or no c/o pain    10  [x]  Ice     []  Heat    location/position: Supine with wedge under B LEs   [x]Skin assessment post-treatment (if applicable):   [x]  intact    []  redness- no adverse reaction                  []redness - adverse reaction:      16 min Therapeutic Exercise:  [x]  See flow sheet   Rationale:      increase ROM, increase strength and improve coordination to improve the patients ability to  safely perform ADLs/transfers/squatting/prolong stding and ambulation/stair negotiation with minimal or no c/o pain    10 min Therapeutic Activity: [x]  See flow sheet   Rationale:    increase ROM and increase strength to improve the patients ability to  safely perform ADLs/transfers/squatting/prolong stding and ambulation/stair negotiation with minimal or no c/o pain      10 min Neuromuscular Re-ed: [x]  See flow sheet   Rationale:    increase ROM, increase strength and improve coordination to improve the patients ability to  safely perform ADLs/transfers/squatting/prolong stding and ambulation/stair negotiation with minimal or no c/o pain    20 min Gait Training:  Catrina Fwd/lateral  dynamic warm up: HK, Butt kicks, HS step, Frankenstein, Heel/Toe walking, lateral lunge + step   Rationale: To improve ambulation safety and efficiency in order to improve patient's ability to safely ambulate at home for self care. Billed With/As:   [x] TE   [x] TA   [x] Neuro   [] Self Care Patient Education: [x] Review HEP    [] Progressed/Changed HEP based on:   [x] positioning   [x] body mechanics   [x] transfers   [x] heat/ice application    [x] other: Pt ed on importance and benefits of compliance with HEP, core strength/stability and proper posture; pt verbalized understanding       Other Objective/Functional Measures:    VCs + demo to perform proper technique and for safety with TE  Dynamic warm up: HK/butt kick amb; Frankenstein, HS step , Heel/toe amb, and lateral lunge x 60\"     added YTB to SS x 30'    Post Treatment Pain Level (on 0 to 10) scale:   0  / 10     ASSESSMENT  Assessment/Changes in Function:   (I) with GT  Progressed TE without c/o increase p! []  See Progress Note/Recertification   Patient will continue to benefit from skilled PT services to modify and progress therapeutic interventions, address functional mobility deficits, address ROM deficits, address strength deficits, analyze and address soft tissue restrictions, analyze and cue movement patterns, analyze and modify body mechanics/ergonomics, assess and modify postural abnormalities and instruct in home and community integration to attain remaining goals. Progress toward goals / Updated goals: · Short Term Goals: To be accomplished in  3  weeks:          Goal/Measure of Progress Goal Met? 1. Pt will be compliant with HEP for symptom management at home. Status at last Eval: NA Current Status:   Established       HEP progressing   2.   Patient will self report reduction of pain level at worst to 3/10 to allow for improved sitting tolerance.     Status at last Eval:  7/10 Current Status:     6/10 progressing           PLAN  [x]  Upgrade activities as tolerated yes Continue plan of care   []  Discharge due to :    []  Other:      Therapist: Desire Siu PTA    Date: 6/22/2022 Time: 3:37 PM     Future Appointments   Date Time Provider Karishma Schultz   6/27/2022  8:00 AM Julio Molina [facility]   6/29/2022  8:45 AM Emmie Rosales PT [facility]

## 2022-06-27 ENCOUNTER — HOSPITAL ENCOUNTER (OUTPATIENT)
Dept: PHYSICAL THERAPY | Age: 37
Discharge: HOME | End: 2022-06-27
Payer: MEDICAID

## 2022-06-27 PROCEDURE — 97110 THERAPEUTIC EXERCISES: CPT

## 2022-06-27 PROCEDURE — 97530 THERAPEUTIC ACTIVITIES: CPT

## 2022-06-27 PROCEDURE — 97116 GAIT TRAINING THERAPY: CPT

## 2022-06-27 NOTE — PROGRESS NOTES
PHYSICAL THERAPY - DAILY TREATMENT NOTE    Patient Name: Shanta Long        Date: 2022  : 1985   YES Patient  Verified  Visit #:     Insurance: Payor: [place] [employer] / Plan: [address] [facility] / Product Type: Managed Care Medicaid /      In time: 8:03 Out time: 8:59   Total Treatment Time: 56     Medicare/BCBS [place] Time Tracking (below)   Total Timed Codes (min):  na 1:1 Treatment Time:  na     TREATMENT AREA =  Left hip pain [M25.552]    SUBJECTIVE    Pain Level (on 0 to 10 scale):  1  / 10 left hip   3/10 B knees    Medication Changes/New allergies or changes in medical history, any new surgeries or procedures? NO    If yes, update Summary List   Subjective Functional Status/Changes:  []  No changes reported     Pt reports had knee pain yesterday, states it was more pain than normal she does sometimes have left knee pain. Pt states working on her bridges in the bed.         OBJECTIVE    Modalities Rationale: decrease inflammation and decrease pain to improve patient's ability to  safely perform ADLs/transfers/squatting/prolong stding and ambulation/stair negotiation with minimal or no c/o pain   min [] Estim, type/location:                                      []  att     []  unatt     []  w/US     []  w/ice    []  w/heat    min []  Mechanical Traction: type/lbs                   []  pro   []  sup   []  int   []  cont    []  before manual    []  after manual    min []  Ultrasound, settings/location:      min []  Iontophoresis w/ dexamethasone, location:                                               []  take home patch       []  in clinic   10 min [x]  Ice     []  Heat    location/position: CP to left hip, pt seated EOB    min []  Vasopneumatic Device, press/temp:     min []  Other:    [x] Skin assessment post-treatment (if applicable):    [x]  intact    []  redness- no adverse reaction     []redness - adverse reaction:      Therapeutic Procedures:  Min Procedure Specifics + Rationale   21 [x] Therapeutic Exercise   [x]  See Flowsheet   Rationale: increase ROM and increase strength to improve the patients ability to participate in ADL's    15 [x] Therapeutic Activity   [x]  See Flowsheet  Rationale: To improve safety, proprioception, coordination, and efficiency with tasks   10 [x]  Gait Training [x]  See Flowsheet  Rationale: Normalize gait, increase proprioceptive and kinesthetic awareness, coordination, balance       min Patient Education:  YES  Reviewed HEP   []  Progressed/Changed HEP based on:   Patient with no questions, continue same HEP     Other Objective/Functional Measures:    Increased reps for bridge ex. Added clams and prone HS curls for strengthening. Added prone quad stretch to address symptoms of knee pain. Post Treatment Pain Level (on 0 to 10) scale:   1  / 10 left hip   4/10 left knee      ASSESSMENT    Assessment/Changes in Function:     Pt with new c/o B (left>right) knee pain with ADL's and .      []  See Progress Note/Recertification   Patient will continue to benefit from skilled PT services to modify and progress therapeutic interventions, address functional mobility deficits, address ROM deficits, address strength deficits, analyze and address soft tissue restrictions, analyze and cue movement patterns, assess and modify postural abnormalities, and instruct in home and community integration to attain remaining goals. Progress toward goals / Updated goals: · Short Term Goals: To be accomplished in  3  weeks:          Goal/Measure of Progress Goal Met? 1. Pt will be compliant with HEP for symptom management at home. Status at last Eval: NA Current Status: Unable n/a   2. Patient will self report reduction of pain level at worst to 3/10 to allow for improved sitting tolerance. Status at last Eval: NA Current Status: 7/10 n/a      · Long Term Goals:  To be accomplished in  6  weeks:          Goal/Measure of Progress Goal Met? 1. Pt will be independent with HEP at D/C for self management. Status at last Eval: NA Current Status: Unable n/a   2. Patient will demonstrate improved AROM left hip ER to 40* to allow for improved transition into/out of car. Status at last Eval: NA Current Status: 30* n/a   3. Patient will demonstrate improved bilateral hip extension strength to 4+/5 to allow for improved walking tolerance. Status at last Eval: NA Current Status: 4-/5  increased reps bridges  n/a   4. Patient will increase FOTO Functional Status score to 68 to decrease functional limitations.    Status at last Eval: NA Current Status: 62 n/a        PLAN    []  Upgrade activities as tolerated YES Continue plan of care   []  Discharge due to :    []  Other:      Therapist: Marely Shankar PTA    Date: 6/27/2022 Time: 8:46 AM     Future Appointments   Date Time Provider Karishma Schultz   6/29/2022  8:45 AM Miguel Ambriz PT [facility]

## 2022-06-29 ENCOUNTER — HOSPITAL ENCOUNTER (OUTPATIENT)
Dept: PHYSICAL THERAPY | Age: 37
Discharge: HOME | End: 2022-06-29
Payer: MEDICAID

## 2022-06-29 PROCEDURE — 97110 THERAPEUTIC EXERCISES: CPT

## 2022-06-29 PROCEDURE — 97530 THERAPEUTIC ACTIVITIES: CPT

## 2022-06-29 PROCEDURE — 97116 GAIT TRAINING THERAPY: CPT

## 2022-06-29 PROCEDURE — 97112 NEUROMUSCULAR REEDUCATION: CPT

## 2022-06-29 NOTE — PROGRESS NOTES
PHYSICAL THERAPY - DAILY TREATMENT NOTE    Patient Name: Nathan Guevara        Date: 2022  : 1985   YES Patient  Verified  Visit #:     Insurance: Payor: Cristofer Main / Plan: [address] [place] [address] / Product Type: Managed Care Medicaid /      In time: 8:47 Out time: 9:40   Total Treatment Time: 53       TREATMENT AREA =  Left hip pain [M25.552]    SUBJECTIVE    Pain Level (on 0 to 10 scale):  1  / 10   Medication Changes/New allergies or changes in medical history, any new surgeries or procedures? NO    If yes, update Summary List   Subjective Functional Status/Changes:  []  No changes reported     Pt with no new complaints, mild pain \"nagging\" in hip. \"I feel like I'm getting better. \"          OBJECTIVE    14 min Therapeutic Exercise:  [x]  See flow sheet   Rationale:      increase ROM and increase strength to improve the patients ability to perform ADL's with greater ease. 14 min Therapeutic Activity: See flow sheet   Rationale:   increase mobility, endurance, and strength to improve patient's ability to complete functional tasks with greater ease. 15 min Neuromuscular Re-ed: See flow sheet   Rationale:   improve balance, coordination, proprioception to improve patient's ability to complete functional tasks safely. 10 min Gait training: See flow sheet   Rationale:    Normalize gait, increase proprioceptive and kinesthetic awareness, coordination, balance       min Patient Education:  YES  Reviewed HEP   []  Progressed/Changed HEP based on:   Cont with HEP     Other Objective/Functional Measures:    See flow sheet for more details. Required VCs and demo for proper form with dead bugs.       Post Treatment Pain Level (on 0 to 10) scale:   0  / 10     ASSESSMENT    Assessment/Changes in Function:     Pt tolerated session well without difficulty other than c/o of muscular fatigue/deconditioning, indicating positive response to therex/treatment to further improve functional status. Treatment carried out by RITA Martinez under direct supervision of Kojo Christensen PT, DPT. []  See Progress Note/Recertification   Patient will continue to benefit from skilled PT services to analyze, cue, progress, modify,, demonstrate, instruct, and address, movement patterns, therapeutic interventions, postural abnormalities, soft tissue restrictions, ROM, strength, functional mobility, body mechanics/ergonomics, and home and community integration, to attain remaining goals. Progress toward goals / Updated goals: · Short Term Goals: To be accomplished in  3  weeks:                Goal/Measure of Progress Goal Met? 1.  Pt will be compliant with HEP for symptom management at home.    Status at last Eval: NA Current Status: Unable n/a   2.  Patient will self report reduction of pain level at worst to 3/10 to allow for improved sitting tolerance.    Status at last Eval: NA Current Status: 7/10 n/a           PLAN    []  Upgrade activities as tolerated YES Continue plan of care   []  Discharge due to :    []  Other:      Therapist: Kojo Christensen PT, DPT    Date: 6/29/2022 Time: 8:04 AM     Future Appointments   Date Time Provider Karishma Schultz   6/29/2022  8:45 AM Thersa Pill, PT [facility]

## 2022-07-06 ENCOUNTER — HOSPITAL ENCOUNTER (OUTPATIENT)
Dept: PHYSICAL THERAPY | Age: 37
Discharge: HOME | End: 2022-07-06
Payer: MEDICAID

## 2022-07-06 PROCEDURE — 97116 GAIT TRAINING THERAPY: CPT

## 2022-07-06 PROCEDURE — 97110 THERAPEUTIC EXERCISES: CPT

## 2022-07-06 PROCEDURE — 97112 NEUROMUSCULAR REEDUCATION: CPT

## 2022-07-06 PROCEDURE — 97530 THERAPEUTIC ACTIVITIES: CPT

## 2022-07-06 NOTE — PROGRESS NOTES
[address] [place] [address] PHYSICAL THERAPY  [address] [place] [address] Cirilo Toney, [address] - Phone: [phone]  Fax: [phone]  PROGRESS NOTE  Patient Name: Fabricio Ortiz : 1985   Treatment/Medical Diagnosis: Left hip pain [M25.552]   Referral Source: Shweta Monreal Provider #  [id number]   Date of Initial Visit: 22 Attended Visits: 8 Missed Visits: 0     SUMMARY OF TREATMENT  Patient's POC has consisted of therex, therapeutic activities, manual therapy prn, modalities prn, pt. education, and a comprehensive HEP. Treatment strategies used to address functional mobility deficits, ROM deficits, strength deficits, analyze and address soft tissue restrictions, analyze and cue movement patterns, analyze and modify body mechanics/ergonomics, assess and modify postural abnormalities and instruct in home and community integration. CURRENT STATUS  Devon is progressing towards goals in PT by progressing towards (I) with HEP, and improving activity engagement. Pt reports 40% overall improvement since [facility] with max p! 4/10. Pt reports 1.5 hr of sitting without limitations and no limitations walking 1 mile. Pt is (I) with gait training in clinic, and demos pain free mini squat. Goal/Measure of Progress Goal Met? 1. Pt will be compliant with HEP for symptom management at home. Status at last Eval:  Established              HEP Current Status:   compliant  MET   2. Patient will self report reduction of pain level at worst to 3/10 to allow for improved sitting tolerance. Status at last Eval:  6/10 Current Status:   4/10 progressing          New Goals to be achieved in __4__  weeks:  Goal/Measure of Progress Goal Met? 1. Pt will be independent with HEP at D/C for self management. Status at last Eval: NA Current Status: Unable n/a   2. Patient will demonstrate improved AROM left hip ER to 40* to allow for improved transition into/out of car.     Status at last Eval: NA Current Status: 30* n/a   3. Patient will demonstrate improved bilateral hip extension strength to 4+/5 to allow for improved walking tolerance. Status at last Eval: NA Current Status: 4-/5 n/a   4. Patient will increase FOTO Functional Status score to 68 to decrease functional limitations. Status at last Eval: NA Current Status: 58 n/a      RECOMMENDATIONS  Pt will benefit from further skilled PT services 2x wk x 4-6 wks to increase strength/stability and decrease sxs to promote functional mobility. If you have any questions/comments please contact us directly at [phone]. Thank you for allowing us to assist in the care of your patient. LPTA Signature: Phoebe Long PTA  Date: 7/6/2022   PT Signature: Kemar Villaseñor PT, DPT Time: 4:33 PM   NOTE TO PHYSICIAN:  PLEASE COMPLETE THE ORDERS BELOW AND FAX TO   [facility]: [phone] If you are unable to process this request in 24 hours please contact our office: [phone].    ___ I have read the above report and request that my patient continue as recommended.   ___ I have read the above report and request that my patient continue therapy with the following changes/special instructions:_________________________________________________________   ___ I have read the above report and request that my patient be discharged from therapy.      Physician Signature:        Date:       Time:                                  Omar Thomas PA-C

## 2022-07-06 NOTE — PROGRESS NOTES
PHYSICAL THERAPY - DAILY TREATMENT NOTE    Patient Name: Uvaldo Travis        Date: 2022  : 1985   yes Patient  Verified  Visit #:     Insurance: Payor: Chaparro Locke Chantelle / Plan: [facility] / Product Type: Managed Care Medicaid /      In time: 9810 Out time: 405   Total Treatment Time: 51     TREATMENT AREA =  Left hip pain [M25.552]    SUBJECTIVE  Pain Level (on 0 to 10 scale):  4  / 10   Medication Changes/New allergies or changes in medical history, any new surgeries or procedures?    no  If yes, update Summary List   Subjective Functional Status/Changes:  []  No changes reported     \"I woke up and felt it more today. Further down my leg too. I am not sure. I feel it every where from eating too much red meat this weekend. I also did a lot of bending in the garden''    Pt 1.5 hr of sitting without limitations  no limitations walking 1 mile  max p! 4/10     OBJECTIVE    16 min Therapeutic Exercise:  [x]  See flow sheet   Rationale:      increase ROM, increase strength and improve coordination to improve the patients ability to  safely perform ADLs/transfers/squatting/prolong stding and ambulation/stair negotiation with minimal or no c/o pain    11 min Therapeutic Activity: [x]  See flow sheet   Rationale:    increase ROM and increase strength to improve the patients ability to  safely perform ADLs/transfers/squatting/prolong stding and ambulation/stair negotiation with minimal or no c/o pain      12 min Neuromuscular Re-ed: [x]  See flow sheet   Rationale:    increase ROM, increase strength and improve coordination to improve the patients ability to  safely perform ADLs/transfers/squatting/prolong stding and ambulation/stair negotiation with minimal or no c/o pain    12 min Gait Training:  dynamic warm up: HK, Butt kicks, HS step, Frankenstein, Heel/Toe walking, lateral lunge + step   Rationale:  To improve ambulation safety and efficiency in order to improve patient's ability to safely ambulate at home for self care. Billed With/As:   [x] TE   [x] TA   [x] Neuro   [] Self Care Patient Education: [x] Review HEP    [] Progressed/Changed HEP based on:   [x] positioning   [x] body mechanics   [x] transfers   [x] heat/ice application    [x] other: Pt ed on importance and benefits of compliance with HEP, core strength/stability and proper posture; pt verbalized understanding       Other Objective/Functional Measures:  SUSAN over bar- reduced LLE radic sxs   VCs + demo to perform proper technique and for safety with TE  performed HS str at stairs   (I) with GT  demos proper, p! free mini squat   Post Treatment Pain Level (on 0 to 10) scale:   4  / 10     ASSESSMENT  Assessment/Changes in Function:   See PN     []  See Progress Note/Recertification   Patient will continue to benefit from skilled PT services to modify and progress therapeutic interventions, address functional mobility deficits, address ROM deficits, address strength deficits, analyze and address soft tissue restrictions, analyze and cue movement patterns, analyze and modify body mechanics/ergonomics, assess and modify postural abnormalities and instruct in home and community integration to attain remaining goals. Progress toward goals / Updated goals: · Short Term Goals: To be accomplished in  3  weeks:          Goal/Measure of Progress Goal Met? 1. Pt will be compliant with HEP for symptom management at home. Status at last Eval:  Established              HEP Current Status:   compliant  MET   2. Patient will self report reduction of pain level at worst to 3/10 to allow for improved sitting tolerance.     Status at last Eval:  6/10 Current Status:   4/10 progressing           PLAN  [x]  Upgrade activities as tolerated yes Continue plan of care   []  Discharge due to :    [x]  Other: Pt will benefit from further skilled PT services 2x wk x 4 wks to increase strength/stability and decrease sxs to promote functional mobility.      Therapist: Dimitri Lazo PTA    Date: 7/6/2022 Time: 1:11 PM     Future Appointments   Date Time Provider Karishma Schultz   7/7/2022 11:00 AM Vera Moore [facility]   7/11/2022 11:45 AM Sunita Chaparro PTA [facility]   7/13/2022 11:00 AM Alayna Aguila, PT [facility]   7/18/2022 11:00 AM Alayna Aguila, PT [facility]

## 2022-07-07 ENCOUNTER — HOSPITAL ENCOUNTER (OUTPATIENT)
Dept: PHYSICAL THERAPY | Age: 37
Discharge: HOME | End: 2022-07-07
Payer: MEDICAID

## 2022-07-07 PROCEDURE — 97530 THERAPEUTIC ACTIVITIES: CPT

## 2022-07-07 PROCEDURE — 97110 THERAPEUTIC EXERCISES: CPT

## 2022-07-07 PROCEDURE — 97112 NEUROMUSCULAR REEDUCATION: CPT

## 2022-07-07 PROCEDURE — 97116 GAIT TRAINING THERAPY: CPT

## 2022-07-07 NOTE — PROGRESS NOTES
PHYSICAL THERAPY - DAILY TREATMENT NOTE    Patient Name: Mirtha Kapadia        Date: 2022  : 1985   YES Patient  Verified  Visit #:     Insurance: Payor: SHERON JEFF MEDICAID / Plan: [address] [facility] / Product Type: Managed Care Medicaid /      In time: 11:03 Out time: 11:47   Total Treatment Time: 44     Medicare/BCBS [place] Time Tracking (below)   Total Timed Codes (min):  na 1:1 Treatment Time:  na     TREATMENT AREA =  Left hip pain [M25.552]    SUBJECTIVE    Pain Level (on 0 to 10 scale):  1  / 10   Medication Changes/New allergies or changes in medical history, any new surgeries or procedures? NO    If yes, update Summary List   Subjective Functional Status/Changes:  []  No changes reported     Pt reports yesterday she woke up with a lot of hip pain, but today it isn't bad. Pt reports there is always some discomfort in the left hip in the back, but when it gets bad, the pain will go down the side of her left leg. Pt states sitting increases hip pain, standing in one place for cooking will increase her pain too, but mostly in her lower back. OBJECTIVE    14 min Therapeutic Exercise:  [x]  See flow sheet   Rationale:    increase ROM, increase strength, improve coordination, improve balance and increase proprioception to promote increased functional mobility and increased activity tolerance with ADL's. 10 min Therapeutic Activity: see flow sheet    Rationale:    increase ROM, increase strength, improve coordination, improve balance and increase proprioception to promote increased functional mobility and increased activity tolerance with ADL's.     10 min Neuromuscular Re-ed: see flow sheet    Rationale:     improve coordination, improve balance and increase proprioception to improve the patients ability to perform ADLs, transfers and gait safely and independently    10 min Gait Training: see flow sheet    Rationale:   improve coordination, improve balance, increase proprioception and improve gait pattern to increase safety and [place] with amb to promote increased functional mobility. min Patient Education:  YES  Reviewed HEP   []  Progressed/Changed HEP based on:   Pt reports doing bridge ex and clams frequently at home. Discussed using SUSAN for symptom management. Pt agreeable and verbalized understanding. Other Objective/Functional Measures:    Min VCing for hip stabilization to avoid trunk lean with SLS during forward and lateral tap ups. Reviewed and did a trial of SUSAN for management of LBP and hip pain with static standing. Pt remembered from previous PT, but has not been performing with ADL's. Ioana Splinter for neutral L/S with mini squats and HS stretch at step. Post Treatment Pain Level (on 0 to 10) scale:   0  / 10     ASSESSMENT    Assessment/Changes in Function:     Pt reports increased pain level from yesterday resolved. []  See Progress Note/Recertification   Patient will continue to benefit from skilled PT services to modify and progress therapeutic interventions, address functional mobility deficits, address ROM deficits, address strength deficits, analyze and address soft tissue restrictions, analyze and cue movement patterns, assess and modify postural abnormalities, and instruct in home and community integration to attain remaining goals. Progress toward goals / Updated goals:    Goal/Measure of Progress Goal Met? 1.  Pt will be independent with HEP at D/C for self management.    Status at last Eval: NA Current Status: Reviewed SUSAN for self symptom management  n/a   2.  Patient will demonstrate improved AROM left hip ER to 40* to allow for improved transition into/out of car.    Status at last Eval: NA Current Status: 30* n/a   3.  Patient will demonstrate improved bilateral hip extension strength to 4+/5 to allow for improved walking tolerance.    Status at last Eval: NA Current Status: 4-/5 n/a   4.  Patient will increase FOTO Functional Status score to 68 to decrease functional limitations.    Status at last Eval: NA Current Status: 62 n/          PLAN    []  Upgrade activities as tolerated YES Continue plan of care   []  Discharge due to :    []  Other:      Therapist: Payal Sheridan PTA    Date: 7/7/2022 Time: 1:04 PM     Future Appointments   Date Time Provider Karishma Schultz   7/11/2022 11:45 AM Aravind Bagley PTA [facility] [address]   7/12/2022  4:00 PM Lacy Post PA-C [facility] AMB   7/13/2022 11:00 AM Delia Sanders, PT [facility] [address]   7/18/2022 11:00 AM Alayna Aguila, PT [facility] [address]

## 2022-07-11 ENCOUNTER — HOSPITAL ENCOUNTER (OUTPATIENT)
Dept: PHYSICAL THERAPY | Age: 37
Discharge: HOME | End: 2022-07-11
Payer: MEDICAID

## 2022-07-11 PROCEDURE — 97112 NEUROMUSCULAR REEDUCATION: CPT

## 2022-07-11 PROCEDURE — 97116 GAIT TRAINING THERAPY: CPT

## 2022-07-11 PROCEDURE — 97110 THERAPEUTIC EXERCISES: CPT

## 2022-07-11 PROCEDURE — 97530 THERAPEUTIC ACTIVITIES: CPT

## 2022-07-11 NOTE — PROGRESS NOTES
PHYSICAL THERAPY - DAILY TREATMENT NOTE    Patient Name: Goran Mcghee        Date: 2022  : 1985   yes Patient  Verified  Visit #:   10   of   12  Insurance: Payor: Soren Elmore / Plan: Alicia Young / Product Type: Managed Care Medicaid /      In time: 9642 Out time: 1244   Total Treatment Time: 48     TREATMENT AREA =  Left hip pain [M25.552]    SUBJECTIVE  Pain Level (on 0 to 10 scale):  0 / 10   Medication Changes/New allergies or changes in medical history, any new surgeries or procedures?    no  If yes, update Summary List   Subjective Functional Status/Changes:  []  No changes reported     \"I am feeling great. I had a good weekend, not much pain\"     OBJECTIVE     11 min Therapeutic Exercise:  [x]  See flow sheet   Rationale:      increase ROM, increase strength and improve coordination to improve the patients ability to  safely perform ADLs/transfers/squatting/prolong stding and ambulation/stair negotiation with minimal or no c/o pain    12 min Therapeutic Activity: [x]  See flow sheet   Rationale:    increase ROM and increase strength to improve the patients ability to  safely perform ADLs/transfers/squatting/prolong stding and ambulation/stair negotiation with minimal or no c/o pain      10 min Neuromuscular Re-ed: [x]  See flow sheet   Rationale:    increase ROM, increase strength and improve coordination to improve the patients ability to  safely perform ADLs/transfers/squatting/prolong stding and ambulation/stair negotiation with minimal or no c/o pain    15 min Gait Training:  dynamic warm up: HK, Butt kicks, HS step, Frankenstein, Heel/Toe walking, lateral lunge + step x 60' ea  SS x 30' x 1 with YTB   Rationale: To improve ambulation safety and efficiency in order to improve patient's ability to safely ambulate at home for self care.       Billed With/As:   [x] TE   [x] TA   [x] Neuro   [] Self Care Patient Education: [x] Review HEP    [] Progressed/Changed HEP based on:   [x] positioning   [x] body mechanics   [x] transfers   [x] heat/ice application    [x] other: Pt ed on importance and benefits of compliance with HEP, core strength/stability and proper posture; pt verbalized understanding       Other Objective/Functional Measures:    VCs + demo to perform proper technique and for safety with TE  attempted SL HR, pt unable due to pain, no pain on RLE. Able to perform with LSL ecc lowering  performed lateral tap ups with no UE Assist   added 2# to LAQs  performed standing knee flex with 2#   Post Treatment Pain Level (on 0 to 10) scale:   4  / 10     ASSESSMENT  Assessment/Changes in Function:   Progressed TE without c/o increase p!  no LOB with balance TE     []  See Progress Note/Recertification   Patient will continue to benefit from skilled PT services to modify and progress therapeutic interventions, address functional mobility deficits, address ROM deficits, address strength deficits, analyze and address soft tissue restrictions, analyze and cue movement patterns, analyze and modify body mechanics/ergonomics, assess and modify postural abnormalities and instruct in home and community integration to attain remaining goals. Progress toward goals / Updated goals:     New Goals to be achieved in __4__  weeks:          Goal/Measure of Progress Goal Met? 1.  Pt will be independent with HEP at D/C for self management. Status at last Eval: NA Current Status: Unable n/a   2.  Patient will demonstrate improved AROM left hip ER to 40* to allow for improved transition into/out of car.    Status at last Eval: NA Current Status: 30* n/a   3.  Patient will demonstrate improved bilateral hip extension strength to 4+/5 to allow for improved walking tolerance.    Status at last Eval: NA Current Status: 4-/5 n/a   4.  Patient will increase FOTO Functional Status score to 68 to decrease functional limitations.    Status at last Eval: NA Current Status: 58 n/a        PLAN  [x] Upgrade activities as tolerated yes Continue plan of care   []  Discharge due to :    []  Other:      Therapist: Haile Pickard PTA    Date: 7/11/2022 Time: 3:26 PM     Future Appointments   Date Time Provider Karishma Schultz   7/12/2022  4:00 PM Darren JETT [facility] AMB   7/13/2022 11:00 AM [facility]   7/18/2022 11:00 AM [facility] Aguila[facility]

## 2022-07-12 ENCOUNTER — OFFICE VISIT (OUTPATIENT)
Dept: ORTHOPEDIC SURGERY | Age: 37
End: 2022-07-12
Payer: MEDICAID

## 2022-07-12 VITALS
HEART RATE: 91 BPM | BODY MASS INDEX: 51.96 KG/M2 | HEIGHT: 61 IN | WEIGHT: 275.2 LBS | TEMPERATURE: 96.8 F | OXYGEN SATURATION: 96 %

## 2022-07-12 DIAGNOSIS — M70.62 TROCHANTERIC BURSITIS, LEFT HIP: ICD-10-CM

## 2022-07-12 DIAGNOSIS — M25.652 DECREASED RANGE OF LEFT HIP MOVEMENT: ICD-10-CM

## 2022-07-12 DIAGNOSIS — M76.32 IT BAND SYNDROME, LEFT: ICD-10-CM

## 2022-07-12 DIAGNOSIS — M25.552 ACUTE HIP PAIN, LEFT: Primary | ICD-10-CM

## 2022-07-12 PROCEDURE — 20610 DRAIN/INJ JOINT/BURSA W/O US: CPT | Performed by: PHYSICIAN ASSISTANT

## 2022-07-12 PROCEDURE — 99214 OFFICE O/P EST MOD 30 MIN: CPT | Performed by: PHYSICIAN ASSISTANT

## 2022-07-12 RX ORDER — TRIAMCINOLONE ACETONIDE 40 MG/ML
40 INJECTION, SUSPENSION INTRA-ARTICULAR; INTRAMUSCULAR ONCE
Status: COMPLETED | OUTPATIENT
Start: 2022-07-12 | End: 2022-07-12

## 2022-07-12 RX ADMIN — TRIAMCINOLONE ACETONIDE 40 MG: 40 INJECTION, SUSPENSION INTRA-ARTICULAR; INTRAMUSCULAR at 15:22

## 2022-07-12 NOTE — PROGRESS NOTES
Patient: Jm Bray                MRN: [id number]       SSN: [id number]  YOB: 1985        AGE: 30 y.o. SEX: female          PCP: Lupillo Castillo PA-C  07/12/22 7/12/2022: Patient returns the office with a reoccurrence of acute on chronic left hip trochanteric bursal greater pain. She would like a cortisone injection today. She did well from her previous injection. Chief Complaint   Patient presents with    Hip Pain     lt       HISTORY:  Jm Bray is a 30 y.o. female returns to the office with complaint of left outer hip pain with radiation down to the outer left knee. She was seen previously for right hip osteoarthritic symptoms and ordered a interarticular cortisone injection which significantly improved her right hip discomfort. She has no pain in the right hip today. She also completed a course of physical therapy associated with strengthening of her quad and hamstrings of the lower extremities. Pain in the left hip has been near constant with difficulty laying on her left side, standing from a sitting position, and sitting from a standing position. Pain is a nagging sensation which has not responded to over-the-counter analgesic/anti-inflammatory medications. No trauma reported.       Pain Assessment  7/12/2022   Location of Pain Hip   Location Modifiers Left   Severity of Pain 6   Quality of Pain Throbbing   Quality of Pain Comment -   Duration of Pain Persistent   Frequency of Pain Constant   Date Pain First Started -   Date Pain First Started Comment -   Aggravating Factors Bending;Walking;Standing   Aggravating Factors Comment -   Limiting Behavior -   Relieving Factors Nothing   Relieving Factors Comment -   Result of Injury -           Lab Results   Component Value Date/Time    Hemoglobin A1c 6.4 (H) 09/28/2021 12:00 AM     Weight Metrics 7/12/2022 5/20/2022 4/22/2022 3/8/2022 11/18/2021 10/29/2021 8/16/2018   Weight 275 lb 3.2 oz 269 lb 274 lb 9.6 oz 274 lb 272 lb 271 lb 253 lb   BMI 52 kg/m2 49.2 kg/m2 47.13 kg/m2 50.12 kg/m2 49.75 kg/m2 49.57 kg/m2 46.27 kg/m2            Problem List Items Addressed This Visit     None      Visit Diagnoses     Acute hip pain, left    -  Primary    Relevant Medications    triamcinolone acetonide (KENALOG-40) 40 mg/mL injection 40 mg (Completed) (Start on 7/12/2022  4:00 PM)    Other Relevant Orders    DRAIN/INJECT LARGE JOINT/BURSA    Trochanteric bursitis, left hip        Relevant Medications    triamcinolone acetonide (KENALOG-40) 40 mg/mL injection 40 mg (Completed) (Start on 7/12/2022  4:00 PM)    Other Relevant Orders    DRAIN/INJECT LARGE JOINT/BURSA    Decreased range of left hip movement        Relevant Medications    triamcinolone acetonide (KENALOG-40) 40 mg/mL injection 40 mg (Completed) (Start on 7/12/2022  4:00 PM)    Other Relevant Orders    DRAIN/INJECT LARGE JOINT/BURSA    It band syndrome, left        Relevant Medications    triamcinolone acetonide (KENALOG-40) 40 mg/mL injection 40 mg (Completed) (Start on 7/12/2022  4:00 PM)    Other Relevant Orders    DRAIN/INJECT LARGE JOINT/BURSA          PAST MEDICAL HISTORY:   Past Medical History:   Diagnosis Date    Deviated septum 2012    Multiple nasal polyps 2012    removed        PAST SURGICAL HISTORY:   Past Surgical History:   Procedure Laterality Date    HX TONSILLECTOMY  2008    HX WISDOM TEETH EXTRACTION  2010       ALLERGIES:   Allergies   Allergen Reactions    Asa-Acetaminophen-Caff-Buffers Anaphylaxis     Aspirin portion        CURRENT MEDICATIONS:  A list of medications prior to the time of admission include:  Prior to Admission medications    Medication Sig Start Date End Date Taking? Authorizing Provider   diclofenac (VOLTAREN) 1 % gel Apply 4 g to affected area four (4) times daily.  Apply to proximal lateral hip topically 4 times a day 4 g 4/22/22   Omar Thomas PA-C ergocalciferol (ERGOCALCIFEROL) 1,250 mcg (50,000 unit) capsule TAKE 1 CAPSULE BY MOUTH EVERY 7 DAYS 4/6/22   Ashley Tenorio PA-C   fluticasone propionate (FLONASE) 50 mcg/actuation nasal spray 2 sprays each nostril nightly. 7/16/21   Ashley Tenorio PA-C   sertraline (ZOLOFT) 50 mg tablet Take 1 Tablet by mouth daily. 7/16/21   Augusta Tenorio PA-C   levonorgestreL (MIRENA) 20 mcg/24 hours (5 yrs) 52 mg IUD 1 Device by IntraUTERine route once. Provider, Historical       FAMILY HISTORY:   Family History   Problem Relation Age of Onset    Diabetes Mother        SOCIAL HISTORY:   Social History     Socioeconomic History    Marital status:     Number of children: 1    Years of education: 15   Occupational History    Occupation: none     Employer: NOT EMPLOYED   Tobacco Use    Smoking status: Never Smoker    Smokeless tobacco: Never Used   Vaping Use    Vaping Use: Never used   Substance and Sexual Activity    Alcohol use: No     Alcohol/week: 0.0 standard drinks    Drug use: No    Sexual activity: Yes     Partners: Male     Birth control/protection: Condom   Other Topics Concern     Service No    Blood Transfusions No    Caffeine Concern No    Occupational Exposure No    Hobby Hazards No    Sleep Concern No    Stress Concern No    Weight Concern Yes    Special Diet No    Back Care Yes    Exercise No    Bike Helmet No    Seat Belt Yes    Self-Exams Yes     Comment: 3 months       ROS:No CP, No SOB, No fever/chills nor night sweats. No headaches, vision abnormalities to include double and or loss of vision. No dizziness. No hearing abnormalities. No Chest Pain nor Shortness of breath. Pt denies h/o spinal surgery, injections, or PT/chiropractor. Patient has attempted self treatment with less than adequate relief on oral and topical analgesic / anti inflammatory medications . Pt denies change in bowel or bladder habits. No saddle paresthesia / anesthesia.  Pt denies fever, unplanned weight loss / weight gains, and no skin changes. Musculoskeletal pain per HPI. Pain is exacerbated positionally. PHYSICAL EXAM:    Visit Vitals  Pulse 91   Temp 96.8 °F (36 °C) (Temporal)   Ht 5' 1\" (1.549 m)   Wt 275 lb 3.2 oz (124.8 kg)   SpO2 96%   BMI 52.00 kg/m²       Constitutional: Appears well-developed and well-nourished. No distress. Sitting comfortably in the exam room, interacting with conversation with pleasant affect. Gait appears steady and patient exhibits no evidence of ataxia. Patient is able to ambulate with caution. No focal neurological deficit noted. No facial droop, slurred speech, or evidence of altered mentation noted on exam.   Skin: Skin over the head, neck, bilateral limbs, and trunk is warm and dry. No rash or erythema noted. Cranial Nerves II-XII grossly intact  HENT: NC/AT. Normal symmetry, bulk and tone of facial and neck musculature. Trachea midline. No discernible thyromegaly or masses. No involuntary movements. Lymphatic: No preauricular, submandibuar, anterior or posterior cervical lymphadenopathy. Psychiatric: The patient is awake, alert, and oriented to person, place and time. Behavior is normal. Thought content normal.   Cardiovascular: No clubbing, cyanosis. No edema bilateral lower extremities. Pulmonary: No tripoding nor accessory muscle recruitment. Breathing normally, no distress, no audible wheezing. Distal cap refill intact at 2/2 Pio UE / LE. Neuro intact Pio UE/LE to noxious stimuli        Ortho Specific exam:    Joined by Mariana Jacobson LPN as my chaperone patient was placed in a right recumbent position and left hip draped appropriately to reveal skin. No warmth, erythema, edema, or ecchymosis. No effusions noted. Point tenderness noted over the greater trochanteric region with radiation along the IT band 4 cm proximal and 10 cm distal.      Quad and femoral nerve activity full left lower extremity.     Passive internal or external rotation of the left hip 15 and 18 degrees without pain or guarding. Hip abduction against resistance reproduce pain over the greater trochanteric region. [facility] AP pelvis reveals moderate spurring seen at the superior rim of the acetabulum on the left and less on the inferior rim acetabulum. There is femoral acetabular joint space narrowing of the inferior portion of the joint. No lytic or blastic lesions. No soft tissue ossifications. IMPRESSION:      ICD-10-CM ICD-9-CM    1. Acute hip pain, left  M25.552 719.45 triamcinolone acetonide (KENALOG-40) 40 mg/mL injection 40 mg      DRAIN/INJECT LARGE JOINT/BURSA   2. Trochanteric bursitis, left hip  M70.62 726.5 triamcinolone acetonide (KENALOG-40) 40 mg/mL injection 40 mg      DRAIN/INJECT LARGE JOINT/BURSA   3. Decreased range of left hip movement  M25.652 719.55 triamcinolone acetonide (KENALOG-40) 40 mg/mL injection 40 mg      DRAIN/INJECT LARGE JOINT/BURSA   4. It band syndrome, left  M76.32 728.89 triamcinolone acetonide (KENALOG-40) 40 mg/mL injection 40 mg      DRAIN/INJECT LARGE JOINT/BURSA   6. Obesity BMI 49.20    PLAN: Today we discussed treatment options to include but not limited to a low-dose cortisone injection for her IT band syndrome as well as following physical therapy with specific trochanteric bursal stretching. Patient also was discussed weight loss strategies since she is carrying a BMI of 49.20. Weight loss strategies as below. .  Prior x-ray copy reviewed and provided to patient. Procedural: Using sterile technique and verbal and written consent obtained appropriate timeout formed patient laying right recumbent with left hip draped appropriate to reveal skin at the point of maximal tenderness consistent with the greater trochanteric region 1 cc of Kenalog at 40 mg/mL mixed with 7 mils of Sensorcaine 0.75% injected. There were no complications.   Patient tolerated the procedure well. Chart reviewed for the following:   Marlyn Post PA-C, have reviewed the History, Physical and updated the Allergic reactions for Devon Salcido    TIME OUT performed immediately prior to start of procedure:   BRITTANEY Alyssa Hilton Sejal ARELLANO, have performed the following reviews on Devon KATY Salcido prior to the start of the procedure:            * Patient was identified by name and date of birth   * Agreement on procedure being performed was verified  * Risks and Benefits explained to the patient  * Procedure site verified and marked as necessary  * Patient was positioned for comfort  * Consent was signed and verified             Date of procedure: 07/12/22    Time: 3:20 PM    Procedure performed by:  Brien oSlomon PA-C    Provider assisted by: None     Patient assisted by: self    How tolerated by patient: tolerated the procedure well with no complications    Comments: none             Additionally today we discussed the diagnosis of obesity and the importance of weight management for both cardiovascular health. The patient was recommended to decrease carbohydrate and sugar intake. Patient recommended a formal dietary consult which they will consider and return a call to our office. In light of the patient's osteoarthritic findings I am making a recommendation for aerobic exercise to include but not limited to stationary bicycle, elliptical, therapeutic walking with good shoes and or swimming. Patient should avoid any running or jumping. If using the treadmill then recommendation for no elevation and no running or jogging. Care plan outlined and precautions discussed. Results were reviewed with the patient. All medications were reviewed with the patient. All of pt's questions and concerns were addressed. Alarm symptoms and return precautions associated with chief complaint and evaluation were reviewed with the patient in detail.   The patient demonstrated adequate understanding. The patient expresses willing compliance with the treatment plan. Special note: Medication management discussed in detail all patient's questions answered to their satisfaction. No Narcotic indicated today. Patient given pain medication for short term acute pain relief. Goal is to treat patient according to above plan and to ultimately have patient off all pain medications once appropriate. If chronic pain management is required beyond what is expected for current orthopedic problem, will refer patient to pain management.  was reviewed and will be reviewed with every medication refill request.         Patient provided a reminder for a \"due or due soon\" health maintenance. I have asked the patient to schedule an appointment with their primary care provider for follow-up on general health maintenance concerns. Today all the patient's questions were answered to their satisfaction. Copies of x-rays reviewed if obtained this visit, and provided to patient. Dictation disclaimer:  Please note that this dictation was completed with Kashmi, the computer voice recognition software. Quite often unanticipated grammatical, syntax, homophones, and other interpretive errors are inadvertently transcribed by the computer software. Please disregard these errors. Please excuse any errors that have escaped final proofreading. Fidel MAY, APC, MPAS, PA-C  [facility]

## 2022-07-13 ENCOUNTER — HOSPITAL ENCOUNTER (OUTPATIENT)
Dept: PHYSICAL THERAPY | Age: 37
Discharge: HOME | End: 2022-07-13
Payer: MEDICAID

## 2022-07-13 PROCEDURE — 97112 NEUROMUSCULAR REEDUCATION: CPT

## 2022-07-13 PROCEDURE — 97110 THERAPEUTIC EXERCISES: CPT

## 2022-07-13 PROCEDURE — 97116 GAIT TRAINING THERAPY: CPT

## 2022-07-13 NOTE — PROGRESS NOTES
PHYSICAL THERAPY - DAILY TREATMENT NOTE    Patient Name: Jake Ashton        Date: 2022  : 1985   YES Patient  Verified  Visit #:     Insurance: Payor: [place] [employer] / Plan: [address] [facility] / Product Type: Managed Care Medicaid /      In time: 11:00 Out time: 11:45   Total Treatment Time: 45     Medicare/BCBS [place] Time Tracking (below)   Total Timed Codes (min):  na 1:1 Treatment Time:  na     TREATMENT AREA =   Left hip pain [M25.552]    SUBJECTIVE    Pain Level (on 0 to 10 scale):  0-1  / 10   Medication Changes/New allergies or changes in medical history, any new surgeries or procedures? NO    If yes, update Summary List   Subjective Functional Status/Changes:  []  No changes reported     Pt reports getting injection to L lateral hip yesterday at 2pm. She reports telling her doctor she was scheduled for pt the next day (today) and he said that it would be okay to attend. OBJECTIVE    27 min Therapeutic Exercise:  [x]? See flow sheet   Rationale:      increase ROM, increase strength and improve coordination to improve the patients ability to  safely perform ADLs/transfers/squatting/prolong stding and ambulation/stair negotiation with minimal or no c/o pain     10 min Neuromuscular Re-ed: [x]? See flow sheet   Rationale:    increase ROM, increase strength and improve coordination to improve the patients ability to  safely perform ADLs/transfers/squatting/prolong stding and ambulation/stair negotiation with minimal or no c/o pain     8 min Gait Training:  dynamic warm up: HK, Butt kicks, HS step, Frankenstein, Heel/Toe walking, lateral lunge + step x 60' ea   Rationale: To improve ambulation safety and efficiency in order to improve patient's ability to safely ambulate at home for self care.        min Patient Education:  YES  Reviewed HEP   []  Progressed/Changed HEP based on:   Cont with HEP     Other Objective/Functional Measures:    Due to injection <24 hours ago, focused primarily on mobility work today. Post Treatment Pain Level (on 0 to 10) scale:   0  / 10     ASSESSMENT    Assessment/Changes in Function:     Pt tolerated stretching/mobility work well today without symptoms. Resume with strengthening next visit. Pt going out of town August 19 - August 25.      []  See Progress Note/Recertification   Patient will continue to benefit from skilled PT services to analyze, cue, progress, modify,, demonstrate, instruct, and address, movement patterns, therapeutic interventions, postural abnormalities, soft tissue restrictions, ROM, strength, functional mobility, body mechanics/ergonomics, and home and community integration, to attain remaining goals. Progress toward goals / Updated goals:    New Goals to be achieved in __4__  weeks:                         Goal/Measure of Progress Goal Met? 1.  Pt will be independent with HEP at D/C for self management. Status at last Eval: NA Current Status: Unable n/a   2.  Patient will demonstrate improved AROM left hip ER to 40* to allow for improved transition into/out of car.    Status at last Eval: NA Current Status: 30* n/a   3.  Patient will demonstrate improved bilateral hip extension strength to 4+/5 to allow for improved walking tolerance.    Status at last Eval: NA Current Status: 4-/5 n/a   4.  Patient will increase FOTO Functional Status score to 68 to decrease functional limitations.    Status at last Eval: NA Current Status: 58 n/a            PLAN    []  Upgrade activities as tolerated YES Continue plan of care   []  Discharge due to :    []  Other:      Therapist: Bisi Quezada, PT, DPT    Date: 7/13/2022 Time: 8:00 AM     Future Appointments   Date Time Provider Karishma Schultz   7/13/2022 11:00 AM Teo Blevins, PT [facility]   7/18/2022 11:00 AM Teo Blevins, PT [facility]

## 2022-07-18 ENCOUNTER — HOSPITAL ENCOUNTER (OUTPATIENT)
Dept: PHYSICAL THERAPY | Age: 37
Discharge: HOME | End: 2022-07-18
Payer: MEDICAID

## 2022-07-18 PROCEDURE — 97112 NEUROMUSCULAR REEDUCATION: CPT

## 2022-07-18 PROCEDURE — 97530 THERAPEUTIC ACTIVITIES: CPT

## 2022-07-18 PROCEDURE — 97110 THERAPEUTIC EXERCISES: CPT

## 2022-07-18 PROCEDURE — 97116 GAIT TRAINING THERAPY: CPT

## 2022-07-18 NOTE — PROGRESS NOTES
PHYSICAL THERAPY - DAILY TREATMENT NOTE    Patient Name: Augusta Arias        Date: 2022  : 1985   YES Patient  Verified  Visit #:     Insurance: Payor: [place] [employer] / [facility] [place] / Product Type: Managed Care Medicaid /      In time: 11:00 Out time: 12:05   Total Treatment Time: 65     Medicare/BCBS [place] Time Tracking (below)   Total Timed Codes (min):  65 1:1 Treatment Time:  65     TREATMENT AREA =  Left hip pain [M25.552]    SUBJECTIVE    Pain Level (on 0 to 10 scale):  1  / 10   Medication Changes/New allergies or changes in medical history, any new surgeries or procedures? NO    If yes, update Summary List   Subjective Functional Status/Changes:  []  No changes reported     Pt with minimal to no symptoms over the weekend. She reports sitting for 4 hours this weekend to get hair done. Was able to get up and stretch throughout, SUSAN and PPT were helpful at alleviating pain/tightness. Pt reports feeling relief in lateral hip pain since receiving cortisone injection last week. \"It feels a lot better. \"        OBJECTIVE    20 min Therapeutic Exercise:  [x]  See flow sheet   Rationale:      increase ROM and increase strength to improve the patients ability to perform ADL's with greater ease. 20 min Therapeutic Activity: See flow sheet   Rationale:   increase mobility, endurance, and strength to improve patient's ability to complete functional tasks with greater ease. 17 min Neuromuscular Re-ed: See flow sheet   Rationale:   improve balance, coordination, proprioception to improve patient's ability to complete functional tasks safely. 8 min Gait Training:  dynamic warm up: HK, Butt kicks, HS step, Frankenstein, Heel/Toe walking, lateral lunge + step x 60' ea  Monster walks, side stepping with resistance    Rationale:  To improve ambulation safety and efficiency in order to improve patient's ability to safely ambulate at home for self care.     min Patient Education:  YES  Reviewed HEP   []  Progressed/Changed HEP based on:   Cont with HEP     Other Objective/Functional Measures:    Sharp shooting pain reproduced left posterior lateral hip when in stance phase for hip 3 way. Post Treatment Pain Level (on 0 to 10) scale:   0  / 10     ASSESSMENT    Assessment/Changes in Function:     Remainder of session tolerated well without familiar pain. Pt going out of town for the next couple of weeks. Resume POC upon return. []  See Progress Note/Recertification   Patient will continue to benefit from skilled PT services to analyze, cue, progress, modify,, demonstrate, instruct, and address, movement patterns, therapeutic interventions, postural abnormalities, soft tissue restrictions, ROM, strength, functional mobility, body mechanics/ergonomics, and home and community integration, to attain remaining goals. Progress toward goals / Updated goals:    Goal/Measure of Progress Goal Met? 1.  Pt will be independent with HEP at D/C for self management. Status at last Eval: NA Current Status: Unable n/a   2.  Patient will demonstrate improved AROM left hip ER to 40* to allow for improved transition into/out of car.    Status at last Eval: NA Current Status: 30* n/a   3.  Patient will demonstrate improved bilateral hip extension strength to 4+/5 to allow for improved walking tolerance.    Status at last Eval: NA Current Status: 4-/5 n/a   4.  Patient will increase FOTO Functional Status score to 68 to decrease functional limitations.    Status at last Eval: NA Current Status: 58 n/a        PLAN    []  Upgrade activities as tolerated YES Continue plan of care   []  Discharge due to :    []  Other:      Therapist: Deidre Blank PT, DPT    Date: 7/18/2022 Time: 8:12 AM     Future Appointments   Date Time Provider Karishma Schultz   7/18/2022 11:00 AM Carolann Mercado PT [facility]   7/29/2022  8:00 AM Susan Espinoza [facility]   8/1/2022 11:00 AM Henline, Muriel Hodgkins, CHENTE [facility]   8/4/2022 11:00 AM Luz Ross PTA [facility]

## 2022-07-29 ENCOUNTER — HOSPITAL ENCOUNTER (OUTPATIENT)
Dept: PHYSICAL THERAPY | Age: 37
Discharge: HOME | End: 2022-07-29
Payer: MEDICAID

## 2022-07-29 PROCEDURE — 97112 NEUROMUSCULAR REEDUCATION: CPT

## 2022-07-29 PROCEDURE — 97116 GAIT TRAINING THERAPY: CPT

## 2022-07-29 PROCEDURE — 97110 THERAPEUTIC EXERCISES: CPT

## 2022-07-29 NOTE — PROGRESS NOTES
PHYSICAL THERAPY - DAILY TREATMENT NOTE    Patient Name: Ann Gavin        Date: 2022  : 1985   YES Patient  Verified  Visit #:   3   of   12  Insurance: Payor: Angelo Sandhu / Plan: [address] [facility] / Product Type: Managed Care Medicaid /      In time: 8:01 Out time: 8:47   Total Treatment Time: 46     Medicare/BC[employer] [place] Time Tracking (below)   Total Timed Codes (min):  na 1:1 Treatment Time:  na     TREATMENT AREA =  Left hip pain [M25.552]    SUBJECTIVE    Pain Level (on 0 to 10 scale):  6  / 10   Medication Changes/New allergies or changes in medical history, any new surgeries or procedures? NO    If yes, update Summary List   Subjective Functional Status/Changes:  []  No changes reported     Pt reports went on vacation last week to [facility], went to [place], did have some left hip pain with walking around at the park. Pt states she did the SUSAN while at [place] to help with her back pain. Pt states she can tell a big difference in her hip if she skips the stretching for a day. OBJECTIVE    16 min Therapeutic Exercise:  [x]  See flow sheet   Rationale:    increase ROM, increase strength, improve coordination, improve balance and increase proprioception to promote increased functional mobility and increased activity tolerance with ADL's. 15 min Neuromuscular Re-ed: see flow sheet    Rationale:     improve coordination, improve balance and increase proprioception to improve the patients ability to perform ADLs, transfers and gait safely and independently. 15 min Gait Training: see flow sheet    Rationale:   improve coordination, improve balance, increase proprioception and improve gait pattern to increase safety and Travis with amb to promote increased functional mobility.         min Patient Education:  YES  Reviewed HEP   []  Progressed/Changed HEP based on:   Patient with no questions, continue same HEP     Other Objective/Functional Measures:    Pt c/o soreness in knees with step ups. Pt with quad stretch B LE during dynamic warm up-butt kicks     Pt c/o left lateral hip pain with dead bug ex. PPT performed PRN to manage hip pain. Post Treatment Pain Level (on 0 to 10) scale:   1-2  / 10     ASSESSMENT    Assessment/Changes in Function:     Pt self managing symptoms with SUSAN and hip stretching ex. []  See Progress Note/Recertification   Patient will continue to benefit from skilled PT services to modify and progress therapeutic interventions, address functional mobility deficits, address ROM deficits, address strength deficits, analyze and address soft tissue restrictions, analyze and cue movement patterns, assess and modify postural abnormalities, and instruct in home and community integration to attain remaining goals. Progress toward goals / Updated goals:    Goal/Measure of Progress Goal Met? 1. Pt will be independent with HEP at D/C for self management. Status at last Eval: NA Current Status: Pt self managing symptoms with SUSAN and hip stretching n/a   2. Patient will demonstrate improved AROM left hip ER to 40* to allow for improved transition into/out of car. Status at last Eval: NA Current Status: 30* n/a   3. Patient will demonstrate improved bilateral hip extension strength to 4+/5 to allow for improved walking tolerance. Status at last Eval: NA Current Status: 4-/5 n/a   4. Patient will increase FOTO Functional Status score to 68 to decrease functional limitations.    Status at last Eval: NA Current Status: 58  Pt reports increased hip pain with walking at [place] n/a        PLAN    []  Upgrade activities as tolerated YES Continue plan of care   []  Discharge due to :    []  Other:      Therapist: Charu Gavin PTA    Date: 7/29/2022 Time: 8:52 AM     Future Appointments   Date Time Provider Karishma Schultz   8/1/2022 11:00 AM Negrita Boo PTA [facility]   8/4/2022 11:00 AM Nyla Ross [facility]

## 2022-08-04 ENCOUNTER — HOSPITAL ENCOUNTER (OUTPATIENT)
Dept: PHYSICAL THERAPY | Age: 37
Discharge: HOME | End: 2022-08-04
Payer: MEDICAID

## 2022-08-04 PROCEDURE — 97530 THERAPEUTIC ACTIVITIES: CPT

## 2022-08-04 PROCEDURE — 97116 GAIT TRAINING THERAPY: CPT

## 2022-08-04 PROCEDURE — 97110 THERAPEUTIC EXERCISES: CPT

## 2022-08-04 PROCEDURE — 97112 NEUROMUSCULAR REEDUCATION: CPT

## 2022-08-04 NOTE — PROGRESS NOTES
PHYSICAL THERAPY - DAILY TREATMENT NOTE    Patient Name: Eusebio Garcia        Date: 2022  : 1985   yes Patient  Verified  Visit #:   15   of   20  Insurance: Payor: Mitchell Wang / Plan: Yimi Ruby / Product Type: Managed Care Medicaid /      In time: 1039 Out time: 2696   Total Treatment Time: 58     TREATMENT AREA =  Left hip pain [M25.552]    SUBJECTIVE  Pain Level (on 0 to 10 scale):  0 / 10   Medication Changes/New allergies or changes in medical history, any new surgeries or procedures?    no  If yes, update Summary List   Subjective Functional Status/Changes:  []  No changes reported     \"I feel so great. You all really helped me get better. I was able to walk all over [place] and I was good'! OBJECTIVE    18 min Therapeutic Exercise:  [x]  See flow sheet   Rationale:      increase ROM, increase strength and improve coordination to improve the patients ability to  safely perform ADLs/transfers/squatting/prolong stding and ambulation/stair negotiation with minimal or no c/o pain    15 min Therapeutic Activity: [x]  See flow sheet   Rationale:    increase ROM and increase strength to improve the patients ability to  safely perform ADLs/transfers/squatting/prolong stding and ambulation/stair negotiation with minimal or no c/o pain    10 min Neuromuscular Re-ed: [x]  See flow sheet   Rationale:    increase ROM, increase strength and improve coordination to improve the patients ability to  safely perform ADLs/transfers/squatting/prolong stding and ambulation/stair negotiation with minimal or no c/o pain    15 min Gait Training:  dynamic warm up: HK, Butt kicks, HS step, Frankenstein, Heel/Toe walking, lateral lunge + step x 60' ea  monsterwalk x 60' and SS x 30' x 1 with YTB   Rationale: To improve ambulation safety and efficiency in order to improve patient's ability to safely ambulate at home for self care.       Billed With/As:   [x] TE   [x] TA   [x] Neuro   [] Self Care Patient Education: [x] Review HEP    [] Progressed/Changed HEP based on:   [x] positioning   [x] body mechanics   [x] transfers   [x] heat/ice application    [x] other: Pt ed on importance and benefits of compliance with HEP, core strength/stability and proper posture; pt verbalized understanding       Other Objective/Functional Measures:    VCs + demo to perform proper technique and for safety with TE  initiated YTB to hip abd/ext with no c/o p! performd step ups with no UE assist on 6'' with no safety concerns     Post Treatment Pain Level (on 0 to 10) scale:   0/ 10     ASSESSMENT  Assessment/Changes in Function:   no difficulty with hip ext with YTB  Progressed TE without c/o increase p! []  See Progress Note/Recertification   Patient will continue to benefit from skilled PT services to modify and progress therapeutic interventions, address functional mobility deficits, address ROM deficits, address strength deficits, analyze and address soft tissue restrictions, analyze and cue movement patterns, analyze and modify body mechanics/ergonomics, assess and modify postural abnormalities and instruct in home and community integration to attain remaining goals. Progress toward goals / Updated goals:     New Goals to be achieved in __4__  weeks:          Goal/Measure of Progress Goal Met? 1. Pt will be independent with HEP at D/C for self management. Status at last Eval:  compliant  Current Status:  (I)  yes   2. Patient will demonstrate improved AROM left hip ER to 40* to allow for improved transition into/out of car. Status at last Eval: NA Current Status: 30* n/a   3. Patient will demonstrate improved bilateral hip extension strength to 4+/5 to allow for improved walking tolerance. Status at last Eval: 4-/5 Current Status:  added YTB to hip ext progressing   4. Patient will increase FOTO Functional Status score to 68 to decrease functional limitations.    Status at last Eval: NA Current Status: 62 n/a        PLAN  [x]  Upgrade activities as tolerated yes Continue plan of care   []  Discharge due to :    []  Other: d/c with HEP NV     Therapist: Florencio Benito PTA    Date: 8/4/2022 Time: 12:55 PM     Future Appointments   Date Time Provider Karishma Schultz   8/5/2022  9:30 AM Tiana Chau PTA [facility]

## 2022-08-05 ENCOUNTER — HOSPITAL ENCOUNTER (OUTPATIENT)
Dept: PHYSICAL THERAPY | Age: 37
Discharge: HOME | End: 2022-08-05
Payer: MEDICAID

## 2022-08-05 PROCEDURE — 97116 GAIT TRAINING THERAPY: CPT

## 2022-08-05 PROCEDURE — 97112 NEUROMUSCULAR REEDUCATION: CPT

## 2022-08-05 PROCEDURE — 97530 THERAPEUTIC ACTIVITIES: CPT

## 2022-08-05 PROCEDURE — 97110 THERAPEUTIC EXERCISES: CPT

## 2022-08-05 NOTE — PROGRESS NOTES
[address] [place] [address] PHYSICAL THERAPY  [facility], [address] [employer] [address] - Phone: [phone]  Fax: [phone] SUMMARY   Patient Name: Mitchell Davison : 1985   Treatment/Medical Diagnosis: Left hip pain [M25.552]   Referral Source: Maxi Cat Provider #  [id number]   Date of Initial Visit: 22 Attended Visits: 15 Missed Visits: 0     SUMMARY OF TREATMENT  Patient's POC has consisted of therex, therapeutic activities, manual therapy prn, modalities prn, pt. education, and a comprehensive HEP. Treatment strategies used to address functional mobility deficits, ROM deficits, strength deficits, analyze and address soft tissue restrictions, analyze and cue movement patterns, analyze and modify body mechanics/ergonomics, assess and modify postural abnormalities and instruct in home and community integration. CURRENT STATUS  Devon is progressed well in PT by achieving LTGs #1,3,4. Pt reports 80% overall improvement since [facility] with max p! 1/10 and min p! 0/10. Pt reports daily compliance and (I) with HEP. Pt's increased hip AROM ER=left 35 degs, right =40 degs, IR left=44 left, right=46 (@ SOC ER L30/R40; IR B 40 degs). Pt improved hip strength abd= 5/5, and ext =4+/5 (SOC hip abd=4/4, ext= 4-/5). New Goals to be achieved in __4__  weeks:  Goal/Measure of Progress Goal Met? 1. Pt will be independent with HEP at D/C for self management. Status at last Eval: COMPLIANT  Current Status:      (I) with HEP yes   2. Patient will demonstrate improved AROM left hip ER to 40* to allow for improved transition into/out of car. Status at last Eval: 30* Current Status: 35* progressing   3. Patient will demonstrate improved bilateral hip extension strength to 4+/5 to allow for improved walking tolerance. Status at last Eval:     4-/5 Current Status:  4+/5 yes   4.   Patient will increase FOTO Functional Status score to 68 to decrease functional limitations. Status at last Eval:  58 Current  Status:     73 yes      RECOMMENDATIONS   Pt D/C with instructions to continue HEP Independently. If you have any questions/comments please contact us directly at [phone]. Thank you for allowing us to assist in the care of your patient. LPTA Signature: Griffin Mosher PTA  Date: 8/5/2022   PT Signature: Yaritza Stewart DPT, CLT Time: 4:33 PM   NOTE TO PHYSICIAN:  PLEASE COMPLETE THE ORDERS BELOW AND FAX TO   [facility]: [phone]. If you are unable to process this request in 24 hours please contact our office: [phone].    ___ I have read the above report and request that my patient continue as recommended.   ___ I have read the above report and request that my patient continue therapy with the following changes/special instructions:_________________________________________________________   ___ I have read the above report and request that my patient be discharged from therapy.      Physician Signature:        Date:       Time:                                  Delai Angel PA-C

## 2022-08-05 NOTE — PROGRESS NOTES
PHYSICAL THERAPY - DAILY TREATMENT NOTE    Patient Name: Tobias Paz        Date: 2022  : 1985   yes Patient  Verified  Visit #:   15  of   15  Insurance: Payor: [address] / Plan: [address] [facility] / Product Type: Managed Care Medicaid /      In time: 352 Out time: 1020   Total Treatment Time: 42     TREATMENT AREA =  Left hip pain [M25.552]    SUBJECTIVE  Pain Level (on 0 to 10 scale):  0 / 10   Medication Changes/New allergies or changes in medical history, any new surgeries or procedures?    no  If yes, update Summary List   Subjective Functional Status/Changes:  []  No changes reported     Pt reports 80% overall improvement   See D/C note       OBJECTIVE    18 min Therapeutic Exercise:  [x]  See flow sheet   Rationale:      increase ROM, increase strength and improve coordination to improve the patients ability to  safely perform ADLs/transfers/squatting/prolong stding and ambulation/stair negotiation with minimal or no c/o pain    15 min Therapeutic Activity: [x]  See flow sheet   Rationale:    increase ROM and increase strength to improve the patients ability to  safely perform ADLs/transfers/squatting/prolong stding and ambulation/stair negotiation with minimal or no c/o pain    10 min Neuromuscular Re-ed: [x]  See flow sheet   Rationale:    increase ROM, increase strength and improve coordination to improve the patients ability to  safely perform ADLs/transfers/squatting/prolong stding and ambulation/stair negotiation with minimal or no c/o pain    15 min Gait Training:  dynamic warm up: HK, Butt kicks, HS step, Frankenstein, Heel/Toe walking, lateral lunge + step x 60' ea  monsterwalk x 60' and SS x 30' x 1 with RTB   Rationale: To improve ambulation safety and efficiency in order to improve patient's ability to safely ambulate at home for self care.       Billed With/As:   [x] TE   [x] TA   [x] Neuro   [] Self Care Patient Education: [x] Review HEP    [] Progressed/Changed HEP based on:   [x] positioning   [x] body mechanics   [x] transfers   [x] heat/ice application    [x] other: Pt ed on importance and benefits of compliance with HEP, core strength/stability and proper posture; pt verbalized understanding       Other Objective/Functional Measures:    Min to no VCs + demo to perform proper technique and for safety with TE  hip AROM ER=left 35 degs, right =40 degs  IR=44 left, 46 right  hip abd strength 5/5 B and ext 4+/5 B  FOTO=73  See D/C note       Post Treatment Pain Level (on 0 to 10) scale:   0/ 10     ASSESSMENT  Assessment/Changes in Function:   See D/C note       []  See Progress Note/Recertification   Patient will continue to benefit from skilled PT services to See D/C note     Progress toward goals / Updated goals:     See D/C note         PLAN  []  Upgrade activities as tolerated no Continue plan of care   []  Discharge due to :    []  Other: Pt has made excellent progress as evidence by achieving all LTGs.      Therapist: Aiden Simms PTA    Date: 8/5/2022 Time: 2:14 PM     Future Appointments   Date Time Provider Karishma Schultz   8/8/2022  2:00 PM Bhavik Tenorio PA-C [facility]

## 2022-08-08 ENCOUNTER — OFFICE VISIT (OUTPATIENT)
Dept: FAMILY MEDICINE CLINIC | Age: 37
End: 2022-08-08
Payer: MEDICAID

## 2022-08-08 VITALS
WEIGHT: 269 LBS | HEART RATE: 77 BPM | RESPIRATION RATE: 16 BRPM | TEMPERATURE: 97.5 F | DIASTOLIC BLOOD PRESSURE: 80 MMHG | OXYGEN SATURATION: 95 % | HEIGHT: 64 IN | SYSTOLIC BLOOD PRESSURE: 142 MMHG | BODY MASS INDEX: 45.93 KG/M2

## 2022-08-08 DIAGNOSIS — F33.9 RECURRENT DEPRESSION (HCC): ICD-10-CM

## 2022-08-08 DIAGNOSIS — R73.03 PREDIABETES: Primary | ICD-10-CM

## 2022-08-08 DIAGNOSIS — E55.9 VITAMIN D DEFICIENCY: ICD-10-CM

## 2022-08-08 DIAGNOSIS — J30.9 CHRONIC ALLERGIC RHINITIS: ICD-10-CM

## 2022-08-08 DIAGNOSIS — E66.01 OBESITY, MORBID (HCC): ICD-10-CM

## 2022-08-08 DIAGNOSIS — N94.6 MENSTRUAL CRAMPS: ICD-10-CM

## 2022-08-08 PROCEDURE — 99214 OFFICE O/P EST MOD 30 MIN: CPT | Performed by: PHYSICIAN ASSISTANT

## 2022-08-08 RX ORDER — SERTRALINE HYDROCHLORIDE 50 MG/1
50 TABLET, FILM COATED ORAL DAILY
Qty: 30 TABLET | Refills: 5 | Status: SHIPPED | OUTPATIENT
Start: 2022-08-08

## 2022-08-08 NOTE — PROGRESS NOTES
Goran Mcghee is a 30 y.o.  female presents today for OFFICE visit for [address]. Pt would also like to discuss PREdm. Pt is NOT fasting. Pt is in Room # 5      1. Have you been to the ER, urgent care clinic since your last visit? Hospitalized since your last visit? No    2. Have you seen or consulted any other health care providers outside of the [address] [place] [address] [place] since your last visit? Include any pap smears or colon screening. No    Upcoming Appts  NONE    Health Maintenance reviewed     VORB: No orders of the defined types were placed in this encounter.   WHITLEY Sherman-Marisel Barboza LPN

## 2022-08-08 NOTE — PROGRESS NOTES
HISTORY OF PRESENT ILLNESS  Jagdish Antony is a 30 y.o. female. HPI  Pt is being seen for f/u on her prediabetes, vit D, and weight. She has been seeing ortho for her hip and has had injections which have helped some and she is trying to work on diet and exercise and has lost 6lbs in the past month but is still struggling to loose. She is fasting for labs. She would like to meet with a bariatric surgeon to discuss her options as well. Vit D has helped improve her sleep and overall mood along with zoloft and she needs refills on both. Allergies   Allergen Reactions    Asa-Acetaminophen-Caff-Buffers Anaphylaxis     Aspirin portion       Current Outpatient Medications   Medication Sig    sertraline (ZOLOFT) 50 mg tablet Take 1 Tablet by mouth in the morning.    ergocalciferol (ERGOCALCIFEROL) 1,250 mcg (50,000 unit) capsule TAKE 1 CAPSULE BY MOUTH EVERY 7 DAYS    levonorgestreL (MIRENA) 20 mcg/24 hours (5 yrs) 52 mg IUD 1 Device by IntraUTERine route once. No current facility-administered medications for this visit. Review of Systems   Constitutional:  Negative for chills, fever and malaise/fatigue. Trouble loosing weight   HENT: Negative. Negative for congestion, ear pain, sore throat and tinnitus. Eyes: Negative. Negative for blurred vision, double vision and photophobia. Respiratory: Negative. Negative for cough, shortness of breath and wheezing. Cardiovascular: Negative. Negative for chest pain, palpitations and leg swelling. Gastrointestinal: Negative. Negative for abdominal pain, heartburn, nausea and vomiting. Genitourinary: Negative. Negative for dysuria, frequency, hematuria and urgency. Musculoskeletal:  Positive for back pain and joint pain (left hip). Negative for myalgias and neck pain. Skin: Negative. Negative for itching and rash. Neurological: Negative. Negative for dizziness, tingling, tremors and headaches.    Endo/Heme/Allergies:  Positive for environmental allergies. Psychiatric/Behavioral:  Positive for depression (controlled on meds). Negative for hallucinations, memory loss, substance abuse and suicidal ideas. The patient is not nervous/anxious and does not have insomnia. Visit Vitals  BP (!) 142/80 (BP 1 Location: Left upper arm, BP Patient Position: Sitting)   Pulse 77   Temp 97.5 °F (36.4 °C) (Temporal)   Resp 16   Ht 5' 4\" (1.626 m)   Wt 269 lb (122 kg)   SpO2 95%   BMI 46.17 kg/m²       Physical Exam  Vitals reviewed. Constitutional:       General: She is not in acute distress. Appearance: Normal appearance. She is obese. She is not ill-appearing. HENT:      Head: Normocephalic and atraumatic. Cardiovascular:      Rate and Rhythm: Normal rate and regular rhythm. Pulses: Normal pulses. Heart sounds: Normal heart sounds. Pulmonary:      Effort: Pulmonary effort is normal. No respiratory distress. Breath sounds: Normal breath sounds. Musculoskeletal:      Lumbar back: Tenderness (left side) present. No swelling or deformity. Right hip: Normal.      Left hip: Tenderness and crepitus present. No bony tenderness. Decreased range of motion (due to pain). Neurological:      Mental Status: She is alert and oriented to person, place, and time. Psychiatric:         Mood and Affect: Mood normal.         Behavior: Behavior normal.         Thought Content: Thought content normal.         Judgment: Judgment normal.       ASSESSMENT and PLAN    ICD-10-CM ICD-9-CM    1. Prediabetes  J26.98 441.05 METABOLIC PANEL, COMPREHENSIVE      HEMOGLOBIN A1C WITH EAG      URINALYSIS W/ RFLX MICROSCOPIC      URINALYSIS W/ RFLX MICROSCOPIC      HEMOGLOBIN A1C WITH EAG      METABOLIC PANEL, COMPREHENSIVE      2. Obesity, morbid ([facility] [address]  E66.01 278.01 LIPID PANEL      REFERRAL TO BARIATRIC SURGERY      LIPID PANEL      3. Vitamin D deficiency  E55.9 268.9 VITAMIN D, 25 HYDROXY      VITAMIN D, 25 HYDROXY      4.  Recurrent depression ([facility] [address] F33.9 296.30 sertraline (ZOLOFT) 50 mg tablet      5. Chronic allergic rhinitis  J30.9 477.9 CBC WITH AUTOMATED DIFF      CBC WITH AUTOMATED DIFF      6. Menstrual cramps  N94.6 625.3 CBC WITH AUTOMATED DIFF      CBC WITH AUTOMATED DIFF        Follow-up and Dispositions    Return in about 3 months (around 11/8/2022) for follow up. Pt expressed understanding of visit summary and plans for any follow ups or referrals as well as any medications prescribed.

## 2022-08-09 LAB
25(OH)D3+25(OH)D2 SERPL-MCNC: 31.2 NG/ML (ref 30–100)
ALBUMIN SERPL-MCNC: 4.1 G/DL (ref 3.8–4.8)
ALBUMIN/GLOB SERPL: 1.5 {RATIO} (ref 1.2–2.2)
ALP SERPL-CCNC: 58 IU/L (ref 44–121)
ALT SERPL-CCNC: 19 IU/L (ref 0–32)
APPEARANCE UR: ABNORMAL
AST SERPL-CCNC: 12 IU/L (ref 0–40)
BACTERIA #/AREA URNS HPF: ABNORMAL /[HPF]
BASOPHILS # BLD AUTO: 0 X10E3/UL (ref 0–0.2)
BASOPHILS NFR BLD AUTO: 0 %
BILIRUB SERPL-MCNC: 0.3 MG/DL (ref 0–1.2)
BILIRUB UR QL STRIP: NEGATIVE
BUN SERPL-MCNC: 7 MG/DL (ref 6–20)
BUN/CREAT SERPL: 10 (ref 9–23)
CALCIUM SERPL-MCNC: 9.2 MG/DL (ref 8.7–10.2)
CASTS URNS QL MICRO: ABNORMAL /LPF
CHLORIDE SERPL-SCNC: 100 MMOL/L (ref 96–106)
CHOLEST SERPL-MCNC: 182 MG/DL (ref 100–199)
CO2 SERPL-SCNC: 22 MMOL/L (ref 20–29)
COLOR UR: YELLOW
CREAT SERPL-MCNC: 0.72 MG/DL (ref 0.57–1)
EGFR: 112 ML/MIN/1.73
EOSINOPHIL # BLD AUTO: 0.1 X10E3/UL (ref 0–0.4)
EOSINOPHIL NFR BLD AUTO: 1 %
EPI CELLS #/AREA URNS HPF: ABNORMAL /HPF (ref 0–10)
ERYTHROCYTE [DISTWIDTH] IN BLOOD BY AUTOMATED COUNT: 12.7 % (ref 11.7–15.4)
EST. AVERAGE GLUCOSE BLD GHB EST-MCNC: 128 MG/DL
GLOBULIN SER CALC-MCNC: 2.7 G/DL (ref 1.5–4.5)
GLUCOSE SERPL-MCNC: 93 MG/DL (ref 65–99)
GLUCOSE UR QL STRIP: NEGATIVE
HBA1C MFR BLD: 6.1 % (ref 4.8–5.6)
HCT VFR BLD AUTO: 37.5 % (ref 34–46.6)
HDLC SERPL-MCNC: 49 MG/DL
HGB BLD-MCNC: 12.5 G/DL (ref 11.1–15.9)
HGB UR QL STRIP: NEGATIVE
IMM GRANULOCYTES # BLD AUTO: 0 X10E3/UL (ref 0–0.1)
IMM GRANULOCYTES NFR BLD AUTO: 0 %
IMP & REVIEW OF LAB RESULTS: NORMAL
KETONES UR QL STRIP: NEGATIVE
LDLC SERPL CALC-MCNC: 121 MG/DL (ref 0–99)
LEUKOCYTE ESTERASE UR QL STRIP: ABNORMAL
LYMPHOCYTES # BLD AUTO: 2.6 X10E3/UL (ref 0.7–3.1)
LYMPHOCYTES NFR BLD AUTO: 38 %
MCH RBC QN AUTO: 28.8 PG (ref 26.6–33)
MCHC RBC AUTO-ENTMCNC: 33.3 G/DL (ref 31.5–35.7)
MCV RBC AUTO: 86 FL (ref 79–97)
MICRO URNS: ABNORMAL
MONOCYTES # BLD AUTO: 0.5 X10E3/UL (ref 0.1–0.9)
MONOCYTES NFR BLD AUTO: 7 %
NEUTROPHILS # BLD AUTO: 3.6 X10E3/UL (ref 1.4–7)
NEUTROPHILS NFR BLD AUTO: 54 %
NITRITE UR QL STRIP: NEGATIVE
PH UR STRIP: 5.5 [PH] (ref 5–7.5)
PLATELET # BLD AUTO: 294 X10E3/UL (ref 150–450)
POTASSIUM SERPL-SCNC: 4 MMOL/L (ref 3.5–5.2)
PROT SERPL-MCNC: 6.8 G/DL (ref 6–8.5)
PROT UR QL STRIP: NEGATIVE
RBC # BLD AUTO: 4.34 X10E6/UL (ref 3.77–5.28)
RBC #/AREA URNS HPF: ABNORMAL /HPF (ref 0–2)
SODIUM SERPL-SCNC: 138 MMOL/L (ref 134–144)
SP GR UR STRIP: 1.02 (ref 1–1.03)
TRIGL SERPL-MCNC: 63 MG/DL (ref 0–149)
UROBILINOGEN UR STRIP-MCNC: 0.2 MG/DL (ref 0.2–1)
VLDLC SERPL CALC-MCNC: 12 MG/DL (ref 5–40)
WBC # BLD AUTO: 6.7 X10E3/UL (ref 3.4–10.8)
WBC #/AREA URNS HPF: ABNORMAL /HPF (ref 0–5)

## 2022-09-02 DIAGNOSIS — J30.9 ALLERGIC RHINITIS, UNSPECIFIED SEASONALITY, UNSPECIFIED TRIGGER: ICD-10-CM

## 2022-09-02 RX ORDER — FLUTICASONE PROPIONATE 50 MCG
SPRAY, SUSPENSION (ML) NASAL
Qty: 16 G | Refills: 0 | Status: SHIPPED | OUTPATIENT
Start: 2022-09-02 | End: 2022-10-06

## 2022-09-03 DIAGNOSIS — E55.9 VITAMIN D DEFICIENCY: ICD-10-CM

## 2022-09-05 RX ORDER — ERGOCALCIFEROL 1.25 MG/1
CAPSULE ORAL
Qty: 12 CAPSULE | Refills: 1 | Status: SHIPPED | OUTPATIENT
Start: 2022-09-05

## 2022-09-06 ENCOUNTER — OFFICE VISIT (OUTPATIENT)
Dept: SURGERY | Age: 37
End: 2022-09-06
Payer: MEDICAID

## 2022-09-06 VITALS
WEIGHT: 274 LBS | OXYGEN SATURATION: 97 % | DIASTOLIC BLOOD PRESSURE: 100 MMHG | HEIGHT: 64 IN | SYSTOLIC BLOOD PRESSURE: 148 MMHG | TEMPERATURE: 97.3 F | HEART RATE: 71 BPM | RESPIRATION RATE: 16 BRPM | BODY MASS INDEX: 46.78 KG/M2

## 2022-09-06 DIAGNOSIS — Z01.818 PRE-OP TESTING: ICD-10-CM

## 2022-09-06 DIAGNOSIS — Z01.818 PREOP EXAMINATION: ICD-10-CM

## 2022-09-06 DIAGNOSIS — R79.89 ELEVATED TSH: ICD-10-CM

## 2022-09-06 DIAGNOSIS — E66.01 OBESITY, MORBID (HCC): ICD-10-CM

## 2022-09-06 DIAGNOSIS — K21.9 GASTROESOPHAGEAL REFLUX DISEASE, UNSPECIFIED WHETHER ESOPHAGITIS PRESENT: ICD-10-CM

## 2022-09-06 DIAGNOSIS — E66.01 OBESITY, MORBID (HCC): Primary | ICD-10-CM

## 2022-09-06 PROBLEM — N80.9 ENDOMETRIOSIS: Status: ACTIVE | Noted: 2022-09-06

## 2022-09-06 PROCEDURE — 99205 OFFICE O/P NEW HI 60 MIN: CPT | Performed by: SURGERY

## 2022-09-06 NOTE — PROGRESS NOTES
Jake Ashton is a 30 y.o. female (: 1985) presenting to address:    Chief Complaint   Patient presents with    New Patient     Bariatric consult       Medication list and allergies have been reviewed with Devon Salcido and updated as of today's date. I have gone over all Medical, Surgical and Social History with Devon Salcido and updated/added the information accordingly.

## 2022-09-06 NOTE — LETTER
9/6/2022    Patient: Jm Bray   YOB: 1985   Date of Visit: 9/6/2022     Jennifer Amezquita, [phone]  [address] [facility] [address]    Dear Jennifer Amezquita PA-C,      Thank you for referring Ms. Devon Salcido to [place] [address] for evaluation. My notes for this consultation are attached. If you have questions, please do not hesitate to call me. I look forward to following your patient along with you.       Sincerely,    Héctor Lechuga MD

## 2022-09-06 NOTE — PROGRESS NOTES
Bariatric Surgery Consultation    CC: Consultation from [facility] regarding surgical treatment options for morbid obesity and related comorbidities  Subjective: The patient is a 30 y.o. obese  female with a Body mass index is 47.03 kg/m². . They have been considering surgery for some time now. The patient presents to the clinic today to discuss surgical weight loss options. They have made multiple attempts at weight loss over the years without success. They have tried low-carb, low calorie, high-protein diets. Unfortunately, none of these have lead to meaningful, sustained weight loss. They have attended the bariatric seminar before the visit. DIET:   Cutting back on added sugars and soda. Increasing fruit and vegetable intake. Protein sources: poultry, seafood, eggs. 2-3 meals per day. Large portions. Variable hunger drive, increased at night. Minimal snacking. Reports chronic constipation, treats with prune juice and colon cleanse tea. Denies nausea, vomiting (except during menstrual cycles), dysphagia  Rare reflux    EXERCISE:   Stair climbing, yoga, stretching, previously on PT for her hip pain. Sleep Apnea assessment:    STOPBANG questionnaire     Do you Snore loudly? yes  Do you often feel Tired, fatigued, or sleepy during the daytime? yes  Has anyone Observed you stop breathing during your sleep? yes  Are you being treated for high blood Pressure? no  BMI more than 35 kg/m2? yes  Age over 48years old? no  Neck Circumference >16 inches?  yes  Gender male? no  ______________________________________     SCORE: 5     If YES to 0 - 2, low risk of sleep apnea  If YES to 3 - 4 intermediate risk of having sleep apnea  If YES to 5 - 8 high risk of having sleep apnea (or 2 + BMI 35 or Neck > 17\" or Male)     Pre op weight: 274  EBW: 143  Goal Weight Calc Women: 159.6  Wt loss to date: 0     Patient Active Problem List    Diagnosis Date Noted    Recurrent depression ([facility] [address] 04/03/2018    Obesity, morbid ([facility] [address] 12/19/2017    Chronic allergic rhinitis 08/20/2015    Chronic tension-type headache, not intractable 08/20/2015    Menstrual cramps 08/20/2015    Elevated TSH 05/20/2015      Past Medical History:   Diagnosis Date    Deviated septum 2012    Multiple nasal polyps 2012    removed      Past Surgical History:   Procedure Laterality Date    HX TONSILLECTOMY  2008    HX WISDOM TEETH EXTRACTION  2010      Social History     Tobacco Use    Smoking status: Never    Smokeless tobacco: Never   Substance Use Topics    Alcohol use: No     Alcohol/week: 0.0 standard drinks      Family History   Problem Relation Age of Onset    Diabetes Mother       Prior to Admission medications    Medication Sig Start Date End Date Taking? Authorizing Provider   ergocalciferol (ERGOCALCIFEROL) 1,250 mcg (50,000 unit) capsule TAKE 1 CAPSULE BY MOUTH EVERY 7 DAYS 9/5/22  Yes Mesha Tenorio PA-C   fluticasone propionate (FLONASE) 50 mcg/actuation nasal spray SHAKE LIQUID AND USE 2 SPARYS IN EACH NOSTRIL EVERY NIGHT 9/2/22  Yes Mesha Tenorio PA-C   sertraline (ZOLOFT) 50 mg tablet Take 1 Tablet by mouth in the morning. 8/8/22  Yes Jazmyn Tenorio PA-C   levonorgestreL (MIRENA) 20 mcg/24 hours (5 yrs) 52 mg IUD 1 Device by IntraUTERine route once. Yes Provider, Historical     Allergies   Allergen Reactions    Asa-Acetaminophen-Caff-Buffers Anaphylaxis     Aspirin portion        Review of Systems:    Constitutional: No fever or chills  Neurologic: No headache  Eyes: No scleral icterus or irritated eyes  Nose: No nasal pain or drainage  Mouth: No oral lesions or sore throat  Cardiac: No palpations or chest pain  Pulmonary: No cough or shortness of breath  Gastrointestinal: No nausea, emesis, diarrhea. Reports constipation.   Genitourinary: No dysuria  Musculoskeletal: No muscle or joint tenderness  Skin: No rashes or lesions  Psychiatric: No anxiety or depressed mood    Pertinent positives: see HPI      Objective:     Physical Exam:  Visit Vitals  BP (!) 148/100 (BP 1 Location: Right lower arm)   Pulse 71   Temp 97.3 °F (36.3 °C)   Resp 16   Ht 5' 4\" (1.626 m)   Wt 124.3 kg (274 lb)   SpO2 97%   BMI 47.03 kg/m²     General: No acute distress, conversant  Eyes: PERRLA, no scleral icterus  HENT: Normocephalic without oral lesions  Neck: Trachea midline  Cardiac: Normal pulse rate and rhythm, no edema, capillary refill normal 1 second  Pulmonary: Symmetric chest rise with normal effort, clear to auscultation though mildly obscured by body habitus  GI: Soft, NT, ND, no hernia, no splenomegaly  Skin: Warm without rash  Extremities: No edema or joint stiffness, moves all extremities symmetrically, steady gait  Psych: Appropriate mood and affect    Assessment:     Morbid obesity with comorbidities  Plan:   The patient presents today with severe obesity and obesity related risks/comorbidities as detailed above. The patient has made multiple unsuccessful attempts at weight loss as detailed above. The patient desires surgical weight loss for a better chance of lifelong weight control and control of co-morbidities. They have attended the bariatric seminar and meet the qualifications for surgery based on the NIH criteria. We have discussed the various surgical options including the sleeve gastrectomy, gastric bypass, duodenal switch/modified duodenal switch. We also discussed non operative alternatives. The patient is currently interested in the sleeve gastrectomy. I believe they are a good candidate for this procedure. They will need to a/an UGI to evaluate their anatomy pre operatively. H pylori antigen/breath test ordered as well.     We have discussed the possible complications of bariatric surgery which include but are not limited to failed weight loss, weight regain, malnutrition, leak, bleed, stricture, gastric ulcer, gastric fistula, gastric bleed, gallstones, new or worsening gastric reflux, nausea, emesis, internal hernia, abdominal wall hernia, gastric perforation, need for revision / conversion / or reversal, pregnancy complications and loss, intestinal ischemia, post operative skin complications, possible thinning of their hair, bowel obstruction, dumping syndrome, wound infection, blood clots (DVT, mesenteric thrombus, pulmonary embolism), increased addictive tendency, risk of anesthesia, and death. The patient understands this is a life altering decision and will require compliance to the program for the remainder of their life in order to be monitored to avoid complication and ensure successful, sustained weight control. They will be placed on a lifelong low carbohydrate and low sugar diet, exercise, and vitamin regimen and will require frequent blood draws and office visits to ensure adequate nutrition and program compliance. Visits and follow up will be in compliance with the guidelines set forth by [facility]. I have specifically mentioned the need to avoid all personal and second-hand tobacco exposure, systemic steroids, and NSAIDS after any bariatric surgery to help avoid the above listed complications. The patient has expressed understanding of the above and would like to enroll in the program. The patient will be submitted for medical and psychological clearance along with establishing with out dietician and joining the pre / post operative support group. They will be screened for depression and sleep apnea and treated pre operatively if needed. After successful completion of the preoperative regimen the patient will be submitted for insurance approval and pending this will be scheduled for surgery. Tests/clearances ordered:  CBC, CMP, A1c, H pylori, Vitamin D, CXR, EKG, UGI  Nutrition, support group, PCP clearance, psychological clearance, sleep study (optional)      All questions from the patient have been answered and they have demonstrated appropriate understanding of the process.     RESULTS: Abnormal EKG, cardiology referral placed  UGI  IMPRESSION  Mild exam limitations as above. 1.  Mild esophageal dysmotility. 2.  Small sliding hiatal hernia.       Signed By: Everton Vargas MD [facility]  Bariatric and General Surgeon  [facility]    September 6, 2022

## 2022-09-10 LAB — UREA BREATH TEST QL: NEGATIVE

## 2022-09-12 ENCOUNTER — HOSPITAL ENCOUNTER (OUTPATIENT)
Dept: BARIATRICS/WEIGHT MGMT | Age: 37
Discharge: HOME | End: 2022-09-12

## 2022-09-13 ENCOUNTER — DOCUMENTATION ONLY (OUTPATIENT)
Dept: BARIATRICS/WEIGHT MGMT | Age: 37
End: 2022-09-13

## 2022-09-13 NOTE — PROGRESS NOTES
[address] [place] [address] [facility], [address]    Patient's Name: Teodora Vinson   Age: 30 y.o. YOB: 1985   Sex: female    Date:   9/12/2022    Insurance:  [employer]            Session: 1 of 6  Surgeon:  Dejan Beltrán     Height: 61 inches   Weight:    275      Lbs. BMI: 52.0   Pounds Lost since last month: 0                Pounds Gained since last month: 10    Starting Weight: 274     Previous Months Weight: 274  Overall Pounds Lost: 0   Overall Pounds Gained: 10    Smoking:  no    Alcohol intake:  Number of drinks at a time:  none      Patient has not attended the required bariatric support group. Class Guidelines    Guidelines are reviewed with patient at the start of every class. 1. Patient understands that weight loss trial classes must be consecutive. Patient understands if they miss a class, it is their responsibility to contact me to reschedule class. I will reach out to patient after their first no show. 2.  Patient understands the expectations that weight maintenance/weight loss is expected during the classes. Failure to demonstrate changes may result in one extra month of weight loss trial, followed by going back to see the surgeon. 3. Patient is also instructed to be doing their labs, blood work, psych visit, support group and any other test that the surgeon has used while they are working on their weight loss trial.        Changes Made Since Last Class: stopped sodas    Eating Habits and Behaviors      During today's class, we continued to focus on the key diet principles. Patient was instructed to follow a low carbohydrate diet, focusing on meat and vegetables. Patient was instructed to stop liquid calories and aim for 64 ounces of water per day.  We focused on focusing in on bigger problem areas to start making changes to, such as reducing fast food intake, reducing carbonated beverages/soda intake, decreasing carbohydrates intake daily, etc. We reviewed protein shakes and high protein yogurts to chose, as well. During today's class, we also talked about how to read a label. Patient was given information on: The benefits of reading a label, which allowed one to compare the nutritional value of similar products and make healthy food decisions. The ingredient list, which can help to determine if the food is heathy or something that fits into the diet. The importance of reading the serving size and making sure to apply that to the portion size that they are consuming. Patient was also educated on carbohydrates. I talked to patient about: The function of carbohydrates. Foods that carbohydrate-heavy. Patient was given the guidelines to keep their carbohydrates less than 75 grams per day in the pre-op phase. Patient was also given ideas of low carb swaps, which include zucchini noodles, spaghetti squash, or cauliflower rice. Lower carbohydrate fruit options were discussed. Discussed lower carb swaps to use instead of potato chips. Patient's dietary habits include: Patient is eating 3 meals per day. I have talked to patient about some lower carbohydrate snack choices that focused more protein. Patient is drinking 60 ounces ounces of fluid per day. Fluid intake is make up of: sweet tea, fruit juice. Patient is encouraged to focus on non-caloric, non-caffeine, non-carbonated liquids. Physical Activity/Exercise     Comments:     Currently for exercise, patient walks. I have talked to patient about some suggestions to start an exercise routine. Patient is encouraged to purchase a pedometer and use this to track her steps. I have made some suggestions to patient of ways to incorporate exercise in with a busy lifestyle. We also talked about You Tube videos that can be used for an exercise routine.          Behavior Modification  Comments:  Behavior modifications were discussed with the patient. Some of those behavior modifications include:  Emphasized the importance of eating slowly, not eating and drinking meals at the same time. I have encouraged patient to follow journal, which may be done by paper or tracking it an zina, such as My [employer]itness Pal or [employer]. #5 Chantelle Garcia. Patient understands the importance of following through with these behaviors following surgery to aid in long term weight loss. Tips and advice were given on how to start implementing these into the patient's life. Goal patient has set for next month:  Stopped cravings for fast food  2.   Becoming more motivated to work out  [employer] RDN  9/13/2022

## 2022-10-10 ENCOUNTER — HOSPITAL ENCOUNTER (OUTPATIENT)
Dept: BARIATRICS/WEIGHT MGMT | Age: 37
Discharge: HOME | End: 2022-10-10

## 2022-10-11 ENCOUNTER — DOCUMENTATION ONLY (OUTPATIENT)
Dept: BARIATRICS/WEIGHT MGMT | Age: 37
End: 2022-10-11

## 2022-10-11 NOTE — PROGRESS NOTES
[address] [place] [address] [facility], [address]    Patient's Name: Jihan Hanna     YOB: 1985       Insurance:  [employer]            Session: 2 of  6  Surgeon:  Rochelle Siegel  Date:10/10/2022    Height: 61 inches Weight:    274      Lbs. BMI: 51.8   Pounds Lost since last month: 1               Pounds Gained since last month: 0    Starting Weight: 274   Previous Months Weight: 275  Overall Pounds Lost: 0 Overall Pounds Gained: 0      Do you smoke? no    Alcohol intake:  Number of drinks at a time:  none      Class Guidelines    Guidelines are reviewed with patient at the start of every class. 1. Patient understands that weight loss trial classes must be consecutive. Patient understands if they miss a class, it is their responsibility to contact me to reschedule class. I will reach out to patient after their first no show. 2.  Patient understands the expectations that weight maintenance/weight loss is expected during the classes. Failure to demonstrate changes may result in one extra month of weight loss trial, followed by going back to see the surgeon. 3. Patient is also instructed to be doing their labs, blood work, psych visit, support group and any other test that the surgeon has used while they are working on their weight loss trial.  4. Patient is instructed to bring their education binder to all classes. Changes Made Since Last Class: eat less, drink protein shakes    Eating Habits and Behaviors      Today we reviewed key diet principles. We talked about patient following a low calorie/low carbohydrate diet while they are in weight loss trials. To achieve this, patient is encouraged to avoid liquid calories, including alcohol, soda, sweet tea, and fruit juices. Patient can cut carbohydrates by trying to stick to meat and vegetables.   Patient can also eat eggs, cheese, and good fat, while trying to eliminate starches, such as pasta, rice, crackers, chips, popcorn. I also gave a power point that included 19 Ways to Stay on Track with a Healthy Lifestyle. Some of the food-related suggestions included drinking plenty of water or calorie-free beverages prior to their meal.  Patient is encouraged to to fill up on protein first, which is the ultimate fill-me up food. We talked about the importance of eating breakfast and the effects that can happen if one skips meals, which includes eating larger portions, snacking more, and decreasing their metabolism. With the suggestions in the power point, patient will be able to decrease their calories and carbohydrate intake. Patient's dietary habits include: Patient is eating 340 meals per day. I have talked to patient about some lower carbohydrate snack choices that focused more protein. Patient is drinking water, 1 cup fruit juice, 3 cups caffeinated drink and 3 cups sugary drink ouof fluid per day. Physical Activity/Exercise    Comments: We talked about the importance of establishing a work out routine. Patient is currently walking for activity. Goals have been set. Behavior Modification       Comments:   Some of the behavior tips that were included in the power point, include being choosy about night time snacking. Patient was encouraged to make the TV a no eating zone and not eat after 7 pm.  Patient is also encouraged to keep a food journal.      One of the other things we talked about during class is whether or not patient has a support system. Patient has not been to a support group.       Goals set by patient    Eat out less      Wale Connrasheed MOYA  10/11/2022

## 2022-10-31 ENCOUNTER — TRANSCRIBE ORDER (OUTPATIENT)
Dept: REGISTRATION | Age: 37
End: 2022-10-31

## 2022-10-31 ENCOUNTER — HOSPITAL ENCOUNTER (OUTPATIENT)
Dept: PREADMISSION TESTING | Age: 37
Discharge: HOME | End: 2022-10-31
Attending: SURGERY
Payer: MEDICAID

## 2022-10-31 ENCOUNTER — HOSPITAL ENCOUNTER (OUTPATIENT)
Dept: GENERAL RADIOLOGY | Age: 37
Discharge: HOME | End: 2022-10-31
Attending: SURGERY
Payer: MEDICAID

## 2022-10-31 DIAGNOSIS — E66.01 OBESITY, MORBID (HCC): ICD-10-CM

## 2022-10-31 DIAGNOSIS — Z01.818 OTHER SPECIFIED PRE-OPERATIVE EXAMINATION: Primary | ICD-10-CM

## 2022-10-31 DIAGNOSIS — Z01.818 PREOP EXAMINATION: ICD-10-CM

## 2022-10-31 DIAGNOSIS — K21.9 GASTROESOPHAGEAL REFLUX DISEASE, UNSPECIFIED WHETHER ESOPHAGITIS PRESENT: ICD-10-CM

## 2022-10-31 DIAGNOSIS — Z01.818 OTHER SPECIFIED PRE-OPERATIVE EXAMINATION: ICD-10-CM

## 2022-10-31 LAB
25(OH)D3 SERPL-MCNC: 55.3 NG/ML (ref 30–100)
TSH SERPL DL<=0.05 MIU/L-ACNC: 2.8 UIU/ML (ref 0.36–3.74)

## 2022-10-31 PROCEDURE — 71046 X-RAY EXAM CHEST 2 VIEWS: CPT

## 2022-10-31 PROCEDURE — 84443 ASSAY THYROID STIM HORMONE: CPT

## 2022-10-31 PROCEDURE — 82306 VITAMIN D 25 HYDROXY: CPT

## 2022-10-31 PROCEDURE — 74011000250 HC RX REV CODE- 250: Performed by: SURGERY

## 2022-10-31 PROCEDURE — 74246 X-RAY XM UPR GI TRC 2CNTRST: CPT

## 2022-10-31 PROCEDURE — 93005 ELECTROCARDIOGRAM TRACING: CPT

## 2022-10-31 PROCEDURE — 36415 COLL VENOUS BLD VENIPUNCTURE: CPT

## 2022-10-31 RX ADMIN — BARIUM SULFATE 135 ML: 980 POWDER, FOR SUSPENSION ORAL at 09:58

## 2022-10-31 RX ADMIN — BARIUM SULFATE 176 G: 960 POWDER, FOR SUSPENSION ORAL at 09:59

## 2022-10-31 RX ADMIN — ANTACID/ANTIFLATULENT 8 G: 380; 550; 10; 10 GRANULE, EFFERVESCENT ORAL at 09:58

## 2022-11-01 LAB
ATRIAL RATE: 80 BPM
CALCULATED P AXIS, ECG09: 47 DEGREES
CALCULATED R AXIS, ECG10: 11 DEGREES
CALCULATED T AXIS, ECG11: 9 DEGREES
DIAGNOSIS, 93000: NORMAL
P-R INTERVAL, ECG05: 156 MS
Q-T INTERVAL, ECG07: 372 MS
QRS DURATION, ECG06: 72 MS
QTC CALCULATION (BEZET), ECG08: 429 MS
VENTRICULAR RATE, ECG03: 80 BPM

## 2022-11-04 ENCOUNTER — TELEPHONE (OUTPATIENT)
Dept: SURGERY | Age: 37
End: 2022-11-04

## 2022-11-04 DIAGNOSIS — E66.01 OBESITY, MORBID (HCC): Primary | ICD-10-CM

## 2022-11-04 DIAGNOSIS — Z01.818 PREOP EXAMINATION: ICD-10-CM

## 2022-11-04 NOTE — TELEPHONE ENCOUNTER
Called patient and left message to please call office at earliest convenience.      If patient returns call please please notify patient of abnormal EKG result and that Dr. Alec Gil placed a referral to cardiology for further evaluation

## 2022-11-07 ENCOUNTER — HOSPITAL ENCOUNTER (OUTPATIENT)
Dept: LAB | Age: 37
Discharge: HOME | End: 2022-11-07
Payer: MEDICAID

## 2022-11-07 ENCOUNTER — OFFICE VISIT (OUTPATIENT)
Dept: SURGERY | Age: 37
End: 2022-11-07
Payer: MEDICAID

## 2022-11-07 ENCOUNTER — HOSPITAL ENCOUNTER (OUTPATIENT)
Dept: LAB | Age: 37
Discharge: HOME | End: 2022-11-07

## 2022-11-07 VITALS
HEART RATE: 77 BPM | BODY MASS INDEX: 46.61 KG/M2 | WEIGHT: 273 LBS | DIASTOLIC BLOOD PRESSURE: 77 MMHG | HEIGHT: 64 IN | TEMPERATURE: 98 F | OXYGEN SATURATION: 98 % | SYSTOLIC BLOOD PRESSURE: 134 MMHG

## 2022-11-07 DIAGNOSIS — Z71.89 ENCOUNTER FOR PRE-BARIATRIC SURGERY COUNSELING AND EDUCATION: Primary | ICD-10-CM

## 2022-11-07 DIAGNOSIS — E66.01 OBESITY, MORBID (HCC): ICD-10-CM

## 2022-11-07 LAB
ATRIAL RATE: 81 BPM
CALCULATED P AXIS, ECG09: 46 DEGREES
CALCULATED R AXIS, ECG10: 16 DEGREES
CALCULATED T AXIS, ECG11: 4 DEGREES
DIAGNOSIS, 93000: NORMAL
P-R INTERVAL, ECG05: 164 MS
Q-T INTERVAL, ECG07: 398 MS
QRS DURATION, ECG06: 80 MS
QTC CALCULATION (BEZET), ECG08: 462 MS
VENTRICULAR RATE, ECG03: 81 BPM
XX-LABCORP SPECIMEN COL,LCBCF: NORMAL

## 2022-11-07 PROCEDURE — 93005 ELECTROCARDIOGRAM TRACING: CPT

## 2022-11-07 PROCEDURE — 99001 SPECIMEN HANDLING PT-LAB: CPT

## 2022-11-07 PROCEDURE — 99213 OFFICE O/P EST LOW 20 MIN: CPT | Performed by: REGISTERED NURSE

## 2022-11-07 NOTE — PROGRESS NOTES
Bariatric Preoperative Progress Note    Chief Complaint   Patient presents with    Follow-up     Mid trial       Subjective:     Fabian Koehler is a 30 y.o. female who presents today for follow up of her candidacy for bariatric surgery. Since last seen, Fabian Koehler has been working through out bariatric program towards sleeve gastrectomy with Dr. Jose Osullivan. Consult weight: 274 lbs  Today's weight: 273 lbs     Past Medical History:   Diagnosis Date    Deviated septum 2012    Multiple nasal polyps 2012    removed        Past Surgical History:   Procedure Laterality Date    HX TONSILLECTOMY  2008    HX WISDOM TEETH EXTRACTION  2010       Current Outpatient Medications   Medication Sig Dispense Refill    fluticasone propionate (FLONASE) 50 mcg/actuation nasal spray USE 2 SPRAYS IN EACH NOSTRIL EVERY NIGHT AT BEDTIME 16 g 3    ergocalciferol (ERGOCALCIFEROL) 1,250 mcg (50,000 unit) capsule TAKE 1 CAPSULE BY MOUTH EVERY 7 DAYS 12 Capsule 1    sertraline (ZOLOFT) 50 mg tablet Take 1 Tablet by mouth in the morning. 30 Tablet 5    levonorgestreL (MIRENA) 20 mcg/24 hours (5 yrs) 52 mg IUD 1 Device by IntraUTERine route once. Allergies   Allergen Reactions    Aspirin Anaphylaxis     Has had ibuprofen without severe reactions       ROS:  Review of Systems   Constitutional:  Positive for weight loss. Negative for malaise/fatigue. Respiratory:  Negative for cough and shortness of breath. Cardiovascular:  Negative for chest pain and palpitations. Gastrointestinal:  Negative for abdominal pain. Musculoskeletal:  Positive for joint pain (R hip OA). Neurological:  Negative for dizziness and headaches. Objective:     Physical Exam:  Visit Vitals  /77 (BP 1 Location: Right arm, BP Patient Position: Sitting)   Pulse 77   Temp 98 °F (36.7 °C) (Temporal)   Ht 5' 4\" (1.626 m)   Wt 123.8 kg (273 lb)   SpO2 98%   BMI 46.86 kg/m²       Physical Exam  Constitutional:       Appearance: She is obese. HENT:      Head: Normocephalic and atraumatic. Eyes:      Pupils: Pupils are equal, round, and reactive to light. Cardiovascular:      Rate and Rhythm: Normal rate. Pulmonary:      Effort: Pulmonary effort is normal.   Musculoskeletal:         General: Normal range of motion. Neurological:      Mental Status: She is alert and oriented to person, place, and time. Psychiatric:         Mood and Affect: Mood normal.         Behavior: Behavior normal.         Thought Content: Thought content normal.       Studies to date and results:    Labs: 8/8/2022  H Pylori 9/6/2022: Negative  Vit D: 31.2  A1c 6.1    EKG 11/7/2022: Normal sinus rhythm with sinus arrhythmia, Normal ECG     CXR 10/31/2022: No acute process. UGI 10/31/2022:  IMPRESSION     1. Mild esophageal dysmotility. 2.  Small sliding hiatal hernia. Sleep study 9/19/2022: Scanned in media    Nutritional evaluation: Completed 2 of 6 with Fawn Barber RD    Psychiatric evaluation: Needs to schedule    Support Group: 10/13/2022    Additional evaluations:  PCP Clearance    Assessment:   Yamileth Londono is a 30 y.o. female who is progressing through the bariatric preoperative evaluation. At this time, she is an appropriate candidate for weight loss surgery pending completion of requirements. Plan:   -Complete remainder of preop evaluation including psychiatric evaluation, PCP clearance, and WLT classes.  -Patient communicates understanding that the expectation is to lose or maintain weight during WLT. Weight gain may result in delay or cancellation of surgery.   -Follow up once she has completed entirety of weight loss workup to determine next steps. Karyn Glover NP    I have spent 25 minutes reviewing previous notes, test results, and face to face with the patient discussing the treatment plan as well as documenting on the day of the visit.

## 2022-11-08 LAB
25(OH)D3+25(OH)D2 SERPL-MCNC: 41.1 NG/ML (ref 30–100)
TSH SERPL DL<=0.005 MIU/L-ACNC: 3.19 UIU/ML (ref 0.45–4.5)

## 2022-11-10 ENCOUNTER — HOSPITAL ENCOUNTER (OUTPATIENT)
Dept: BARIATRICS/WEIGHT MGMT | Age: 37
Discharge: HOME | End: 2022-11-10

## 2022-11-15 ENCOUNTER — DOCUMENTATION ONLY (OUTPATIENT)
Dept: BARIATRICS/WEIGHT MGMT | Age: 37
End: 2022-11-15

## 2022-11-15 NOTE — PROGRESS NOTES
[address] Eddie Loss [address] [facility], [address]    Patient's Name: Gurpreet Curtis   Age: 30 y.o. YOB: 1985   Sex: female      [facility]          Session: 3/6   Revision:   Surgeon:  Ronen Garcia    Height: 61 inches Weight:    270.8      Lbs. BMI: 51.2   Pounds Lost since last month: 4               Pounds Gained since last month: 0    Starting Weight: 274   Previous Months Weight: 274  Overall Pounds Lost: 4 Overall Pounds Gained: 0      Do you smoke? no    Alcohol intake:  Number of drinks at a time:  none      Class Guidelines    Guidelines are reviewed with patient at the start of every class. 1. Patient understands that weight loss trial classes must be consecutive. Patient understands if they miss a class, it is their responsibility to contact me to reschedule class. I will reach out to patient after their first no show. 2.  Patient understands the expectations that weight maintenance/weight loss is expected during the classes. Failure to demonstrate changes may result in one extra month of weight loss trial, followed by going back to see the surgeon. 3. Patient is also instructed to be doing their labs, blood work, psych visit, support group and any other test that the surgeon has used while they are working on their weight loss trial.        Patient has  been to a support group meeting. Changes Made Since Last Class: no sodas, more protein      Dietary Instruction    During today's class we continued to focus on the key diet principles. Patient was instructed to follow a low carbohydrate diet, focusing on meat and vegetables. Patient was instructed to stop liquid calories and aim for 64 ounces of water per day. In class, I also gave patient a power point on surviving the holidays.   Some of the tips included survival tips for parties, including bringing their own low carbohydrate dish to a potluck dinner and surveying the buffet line before they start filling up their plate. Patient was given cooking alternatives, including using Splenda for sugar, substituting applesauce for oil in recipes, and using low fat plain yogurt instead of sour cream in dips. Patient was also encouraged to be mindful of calories in alcohol. Patient's dietary habits include:    - Patient is eating 3 meals per day. I have emphasized the importance of trying to eat within 1 hour of waking and having a cut off time in the evening  Addressed to patient that the focus should be on protein and vegetables. Protein will help to burn more calories and keep them fatima throughout the day. Portions are:  smaller. I have encouraged patient to use a smaller plate to measure their portions. We talked in class about the importance of planning ahead and trying to do more meal prep at home, so intake of eating out can be decreased. Patient was instructed to cut out starches and keep under 75 grams of carbohydrates per day. Reviewed what portions of carbohydrates are  I have talked to patient about some lower carbohydrate snack choices that focused more protein. We talked about label reading and cutting out simple sugar. Patient is drinking 64 ounces of fluid per day. Fluid intake is make up of: water, sweet tea, fruit juice, caffeine. Physical Activity/Exercise    Patient is currently doing walking for activity. Today's power point on surviving the holidays also included tips on exercising. This included being creative during the holiday, walking stairs, mall walking, getting resistance bands. Patient was encouraged not to be afraid to excuse themselves from the table to go for a walk after they eat. Behavior Modification    Reinforced behavior changes to make. Patient was encouraged to keep their emotions in check.   Try to HALT and focus on whether they are eating out of hunger or if they are eating out of emotions. Other eating behaviors included surveying the buffet line before starting to fill up their plate.   Patient was given a check off list and encouraged to monitor some of their eating behaviors, such as eating slowly, chewing their food thoroughly, and taking 20-30 minutes to eat a meal.    Goals:  Lose 5 more lbs      Herb Dull RDN  11/15/2022

## 2022-11-28 RX ORDER — DICLOFENAC SODIUM 10 MG/G
GEL TOPICAL
Qty: 100 G | Refills: 2 | Status: SHIPPED | OUTPATIENT
Start: 2022-11-28

## 2022-12-05 ENCOUNTER — OFFICE VISIT (OUTPATIENT)
Dept: ORTHOPEDIC SURGERY | Age: 37
End: 2022-12-05
Payer: MEDICAID

## 2022-12-05 VITALS — RESPIRATION RATE: 16 BRPM | TEMPERATURE: 98 F | BODY MASS INDEX: 47.12 KG/M2 | HEIGHT: 64 IN | WEIGHT: 276 LBS

## 2022-12-05 DIAGNOSIS — M25.552 ACUTE HIP PAIN, LEFT: Primary | ICD-10-CM

## 2022-12-05 RX ORDER — TRIAMCINOLONE ACETONIDE 40 MG/ML
40 INJECTION, SUSPENSION INTRA-ARTICULAR; INTRAMUSCULAR ONCE
Status: COMPLETED | OUTPATIENT
Start: 2022-12-05 | End: 2022-12-05

## 2022-12-05 RX ADMIN — TRIAMCINOLONE ACETONIDE 40 MG: 40 INJECTION, SUSPENSION INTRA-ARTICULAR; INTRAMUSCULAR at 10:10

## 2022-12-05 NOTE — PROGRESS NOTES
Patient: Eric Chino                MRN: [id number]       SSN: [id number]  YOB: 1985        AGE: 30 y.o. SEX: female          PCP: Jeannie Kemp PA-C  12/05/22 12/5/2022: Patient returns the office with a recurrence of acute on chronic left trochanteric bursal symptoms. She was treated previously for the same with a low-dose cortisone injection as well as an interarticular cortisone injection for left hip osteoarthritis. Her symptoms have not reoccurred identical and she is a bit frustrated despite weight loss and activity change/management that she is returned with this condition. 4/22/2022: Patient returns for follow-up regarding her recent interarticular cortisone injection for left hip osteoarthritic symptoms. She is done very well from the injection. She has almost no pain to her left hip. She is also aggressively modifying her diet decreasing sodas and watching her calorie and carbohydrate intake. She today complains of right hip pain similar to that which was found in the left hip on the initial evaluation with our office. She has no bowel or bladder incontinence. No back pain reported. Pain in the right hip limits her ability when she first steps from a sitting position outward to walk or sits from a standing position. Pain on the right side prevents her from laying on her right hip. No history of trauma reported to her right hip. Chief Complaint   Patient presents with    Hip Pain     Lt     Knee Pain     Lt        HISTORY:  Eric Chino is a 30 y.o. female returns to the office with a complaint of left hip acute on pain. She was seen and treated under the care of Dr. Dominique Joseph for trochanteric bursal symptoms of the left hip late last year. The injection only lasted about 2 to 3 weeks.   She was told at that time by Dr. Tila Rodriguez that she did have osteoarthritic findings of the left hip and would benefit with a aggressive weight loss strategy. Pain is intermittent associated with the left outer hip and near the trochanteric region however of recent patients pain into the crotch/growing has increased significantly. She went on a car ride to [facility] over the past week and had a miserable time sitting with pain moderate to severe radiating into her crotch. She denies any bowel or bladder incontinence. No radicular pain reported in lower extremities. Associated with the left trochanteric region pain starts over the upper portion of her left thigh laterally and extends down to just above her left knee laterally. Tylenol and Motrin have been minimally successful for symptom management. Pain Assessment  12/5/2022   Location of Pain Knee; Hip   Location Modifiers Left   Severity of Pain 7   Quality of Pain Throbbing; [place]; Aching   Quality of Pain Comment -   Duration of Pain Persistent   Frequency of Pain Constant   Date Pain First Started (No Data)   Date Pain First Started Comment follow up   Aggravating Factors Walking;Bending;Stretching   Aggravating Factors Comment -   Limiting Behavior Some   Relieving Factors Nothing   Relieving Factors Comment -   Result of Injury No           Lab Results   Component Value Date/Time    Hemoglobin A1c 6.1 (H) 08/08/2022 03:42 PM     Weight Metrics 12/5/2022 11/7/2022 9/6/2022 8/8/2022 7/12/2022 5/20/2022 4/22/2022   Weight 276 lb 273 lb 274 lb 269 lb 275 lb 3.2 oz 269 lb 274 lb 9.6 oz   BMI 47.38 kg/m2 46.86 kg/m2 47.03 kg/m2 46.17 kg/m2 52 kg/m2 49.2 kg/m2 47.13 kg/m2            Problem List Items Addressed This Visit    None  Visit Diagnoses       Acute hip pain, left    -  Primary    Relevant Medications    triamcinolone acetonide (KENALOG-40) 40 mg/mL injection 40 mg (Start on 12/5/2022 10:00 AM)    Other Relevant Orders    AMB POC X-RAY RADEX HIP UNI WITH PELVIS 2-3 VIEWS (Completed)    DRAIN/INJECT LARGE JOINT/BURSA            PAST MEDICAL HISTORY:   Past Medical History:   Diagnosis Date    Deviated septum 2012    Multiple nasal polyps 2012    removed        PAST SURGICAL HISTORY:   Past Surgical History:   Procedure Laterality Date    HX TONSILLECTOMY  2008    HX WISDOM TEETH EXTRACTION  2010       ALLERGIES:   Allergies   Allergen Reactions    Aspirin Anaphylaxis     Has had ibuprofen without severe reactions        CURRENT MEDICATIONS:  A list of medications prior to the time of admission include:  Prior to Admission medications    Medication Sig Start Date End Date Taking? Authorizing Provider   diclofenac (VOLTAREN) 1 % gel APPLY 4 GM TO THE AFFECTED AREA FOUR TIMES DAILY 11/28/22  Yes Sarabjit Post PA-C   fluticasone propionate (FLONASE) 50 mcg/actuation nasal spray USE 2 SPRAYS IN EACH NOSTRIL EVERY NIGHT AT BEDTIME 10/6/22  Yes Saleem Tenorio PA-C   ergocalciferol (ERGOCALCIFEROL) 1,250 mcg (50,000 unit) capsule TAKE 1 CAPSULE BY MOUTH EVERY 7 DAYS 9/5/22  Yes Saleem Tenorio PA-C   sertraline (ZOLOFT) 50 mg tablet Take 1 Tablet by mouth in the morning. 8/8/22  Yes Manisha Tenorio PA-C   levonorgestreL (MIRENA) 20 mcg/24 hours (5 yrs) 52 mg IUD 1 Device by IntraUTERine route once.    Yes Provider, Historical       FAMILY HISTORY:   Family History   Problem Relation Age of Onset    Diabetes Mother        SOCIAL HISTORY:   Social History     Socioeconomic History    Marital status:     Number of children: 1    Years of education: 15   Occupational History    Occupation: none     Employer: NOT EMPLOYED   Tobacco Use    Smoking status: Never    Smokeless tobacco: Never   Vaping Use    Vaping Use: Never used   Substance and Sexual Activity    Alcohol use: No     Alcohol/week: 0.0 standard drinks    Drug use: No    Sexual activity: Yes     Partners: Male     Birth control/protection: Condom   Other Topics Concern     Service No    Blood Transfusions No    Caffeine Concern No    Occupational Exposure No    Hobby Hazards No    Sleep Concern No    Stress Concern No    Weight Concern Yes    Special Diet No    Back Care Yes    Exercise No    Bike Helmet No    Seat Belt Yes    Self-Exams Yes     Comment: 3 months       ROS:No CP, No SOB, No fever/chills nor night sweats. No headaches, vision abnormalities to include double and or loss of vision. No dizziness. No hearing abnormalities. No Chest Pain nor Shortness of breath. Pt denies h/o spinal surgery, injections, or PT/chiropractor. Patient has attempted self treatment with less than adequate relief on oral and topical analgesic / anti inflammatory medications . Pt denies change in bowel or bladder habits. No saddle paresthesia / anesthesia. Pt denies fever, unplanned weight loss / weight gains, and no skin changes. Musculoskeletal pain per HPI. Pain is exacerbated positionally. PHYSICAL EXAM:    Visit Vitals  Temp 98 °F (36.7 °C) (Temporal)   Resp 16   Ht 5' 4\" (1.626 m)   Wt 276 lb (125.2 kg)   BMI 47.38 kg/m²       Constitutional: Appears well-developed and well-nourished. No distress. Sitting comfortably in the exam room, interacting with conversation with pleasant affect. Gait appears steady and patient exhibits no evidence of ataxia. Patient is able to ambulate with caution. No focal neurological deficit noted. No facial droop, slurred speech, or evidence of altered mentation noted on exam.   Skin: Skin over the head, neck, bilateral limbs, and trunk is warm and dry. No rash or erythema noted. Cranial Nerves II-XII grossly intact  HENT: NC/AT. Normal symmetry, bulk and tone of facial and neck musculature. Trachea midline. No discernible thyromegaly or masses. No involuntary movements. Lymphatic: No preauricular, submandibuar, anterior or posterior cervical lymphadenopathy. Psychiatric: The patient is awake, alert, and oriented to person, place and time. Behavior is normal. Thought content normal.   Cardiovascular: No clubbing, cyanosis.   No edema bilateral lower extremities. Pulmonary: No tripoding nor accessory muscle recruitment. Breathing normally, no distress, no audible wheezing. Distal cap refill intact at 2/2 Pio UE / LE. Neuro intact Pio UE/LE to noxious stimuli        Ortho Specific exam:    Patient has an antalgic gait with a slight limp to the right. .        Hip flexor weakness noted on the right compared to the left. Patient sitting at bedside right knee flexed at 90 degrees passive internal/external rotation reproduces pain in the groin on the right with a noted terminal point in both planes of motion of 12 and 15 degrees. Modest weakness in the left hip flexors against resistance today noted at 4-/5. Quad and femoral nerve activity full right lower extremity. Right hip IT band nontender generally throughout. [facility] 12/5/2022 space AP pelvis and left hip reveals osteoarthritic findings noted in the femoral acetabular joint space. There is early spurring seen at the superior rim of the acetabulum and less but still present though at the inferior rim. No lytic or blastic lesions noted. No soft tissue ossifications noted. No fracture deformity seen. IMPRESSION:      ICD-10-CM ICD-9-CM    1. Acute hip pain, left  M25.552 719.45 AMB POC X-RAY RADEX HIP UNI WITH PELVIS 2-3 VIEWS      triamcinolone acetonide (KENALOG-40) 40 mg/mL injection 40 mg      DRAIN/INJECT LARGE JOINT/BURSA      2.. BMI 47.38  3. Weakness left hip flexors  4. Decreased range of motion left hip  5. Trochanteric bursitis left hip recurrent  6. IT band syndrome left hip    . PLAN: Today we discussed alternatives care to include but not limited to a repeat cortisone injection for her IT band syndrome. Consideration for MRI if patient fails to improve from today's therapy or worsens. She will continue weight loss strategies as discussed below.   Her BMI is 47.38 and weight of 276 pounds height 5 foot 4 inches. Procedural: Using sterile technique and verbal and written consent were obtained appropriate timeout formed patient laying right recumbent on the exam table with her left hip draped appropriately to reveal skin is him joined by Addi Royal MA as my chaperone 1 cc of Kenalog at 40 mg/mL mixed with 7 mL of 2% Polocaine administered over the point of maximal tenderness consistent with the greater trochanteric region. There were no complications. Patient tolerated the procedure well. Chart reviewed for the following:   Saranya Post PA-C, have reviewed the History, Physical and updated the Allergic reactions for Devon KATY Salcido    TIME OUT performed immediately prior to start of procedure:   IIlene PA-C, have performed the following reviews on Devon L Omari prior to the start of the procedure:            * Patient was identified by name and date of birth   * Agreement on procedure being performed was verified  * Risks and Benefits explained to the patient  * Procedure site verified and marked as necessary  * Patient was positioned for comfort  * Consent was signed and verified             Date of procedure: 12/05/22    Time: 9:42 AM    Procedure performed by:  Emily Benito PA-C    Provider assisted by: None     Patient assisted by: self    How tolerated by patient: tolerated the procedure well with no complications    Comments: none    The patient is asked to continue to rest the area for a few more days before resuming regular activities. It may be more painful for the first 1-2 days. Watch for fever, or increased swelling or persistent pain in the joint. Call or return to clinic prn if such symptoms occur or there is failure to improve as anticipated. Additionally today we discussed the diagnosis of obesity and the importance of weight management for both cardiovascular health. The patient was recommended to decrease carbohydrate and sugar intake.   Patient recommended a formal dietary consult which they will consider and return a call to our office. In light of the patient's osteoarthritic findings I am making a recommendation for aerobic exercise to include but not limited to stationary bicycle, elliptical, therapeutic walking with good shoes and or swimming. Patient should avoid any running or jumping. If using the treadmill then recommendation for no elevation and no running or jogging. Care plan outlined and precautions discussed. Results were reviewed with the patient. All medications were reviewed with the patient. All of pt's questions and concerns were addressed. Alarm symptoms and return precautions associated with chief complaint and evaluation were reviewed with the patient in detail. The patient demonstrated adequate understanding. The patient expresses willing compliance with the treatment plan. No Narcotic indicated today. Patient given pain medication for short term acute pain relief. Goal is to treat patient according to above plan and to ultimately have patient off all pain medications once appropriate. If chronic pain management is required beyond what is expected for current orthopedic problem, will refer patient to pain management.  was reviewed and will be reviewed with every medication refill request.         Patient provided a reminder for a \"due or due soon\" health maintenance. I have asked the patient to schedule an appointment with their primary care provider for follow-up on general health maintenance concerns. Today all the patient's questions were answered to their satisfaction. Copies of x-rays reviewed if obtained this visit, and provided to patient. Dictation disclaimer:  Please note that this dictation was completed with [employer], the [employer] voice recognition software.   Quite often unanticipated grammatical, syntax, homophones, and other interpretive errors are inadvertently transcribed by the computer software. Please disregard these errors. Please excuse any errors that have escaped final proofreading. Charisse MAY, APC, MPAS, PA-C  [facility]

## 2022-12-08 ENCOUNTER — HOSPITAL ENCOUNTER (OUTPATIENT)
Dept: BARIATRICS/WEIGHT MGMT | Age: 37
Discharge: HOME | End: 2022-12-08

## 2022-12-08 ENCOUNTER — DOCUMENTATION ONLY (OUTPATIENT)
Dept: BARIATRICS/WEIGHT MGMT | Age: 37
End: 2022-12-08

## 2022-12-08 NOTE — PROGRESS NOTES
[address] [place] Loss [address] [facility], [address]    Patient's Name: Mary Salas   Age: 30 y.o. YOB: 1985   Sex: female        Session: 3 of  6  Surgeon:  Dr. Chapito Muhammad    Height: 5 f 1   Weight:    270      Lbs. BMI:    Pounds Lost since last month: 4                Pounds Gained since last month: 0    Starting Weight: 274     Previous Months Weight: 270  Overall Pounds Lost: 4   Overall Pounds Gained: 0      Do you smoke? None    Alcohol intake:  Number of drinks at a time:  None  Number of times a week: None    Class Guidelines    Guidelines are reviewed with patient at the start of every class. 1. Patient understands that weight loss trial classes must be consecutive. Patient understands if they miss a class, it is their responsibility to contact me to reschedule class. I will reach out to patient after their first no show. 2.  Patient understands the expectations that weight maintenance/weight loss is expected during the classes. Failure to demonstrate changes may result in one extra month of weight loss trial, followed by going back to see the surgeon. 3. Patient is also instructed to be doing their labs, blood work, psych visit, support group and any other test that the surgeon has used while they are working on their weight loss trial.    Other Pertinent Information:     Patient has attended support group. Changes Made Since Last Class: Eating less carbohdyrates    Eating Habits and Behaviors      Today we reviewed key diet principles. We talked about protein drinks and ones that would be okay. Patient was encouraged to start getting into a routine now and drinking a shake. Patient may use for a meal replacement or a snack. Patient was also encouraged to stop liquid calories. We talked about fluid choices that would be okay. We also spent a lot of time talking about carbohydrates.   Patient was encouraged to start cutting their carbohydrates out and keep them less than 75 grams per day. Patient was given examples of carbohydrates that are in food. We also talked about the power of protein and the importance of getting more protein in per day than carbohydrates. I have reviewed with patient meal and snack ideas that focus on protein first.    I also reviewed with patient the vitamins that they will need to take post op. Patient will hear this information again at pre op class prior to surgery, but I felt it was important to prepare them now. Patient will be taking 2 multivitamin complete per day, 100 mg of Vitamin B1, 5000 IU of Vitamin D3, 1000 mcg Vitamin B12, 1500 mg of calcium citrate. We also spent some time talking about the post op diet protocol. Patient is aware they will be on a liquid diet before surgery and then a week of liquids after surgery followed by 5 weeks of soft protein. Physical Activity/Exercise    Comments:     Currently for exercise, patient is taking the stairs instead of the elevator. We talked about activities for patient to do, including walking, swimming, or chair exercises. Goals have been set. Behavior Modification       Comments:  Emphasized the importance of eating slowly, not eating and drinking meals at the same time. I have also encouraged patient to work on food journaling  We talked about ways they can track their daily intake. Goals that patient set for next month include:  Resist cravings. Chew food better.     Tha Menchaca Cooper [address]  12/8/2022

## 2023-01-03 ENCOUNTER — OFFICE VISIT (OUTPATIENT)
Dept: CARDIOLOGY CLINIC | Age: 38
End: 2023-01-03
Payer: MEDICAID

## 2023-01-03 VITALS
HEART RATE: 76 BPM | WEIGHT: 270 LBS | BODY MASS INDEX: 46.35 KG/M2 | SYSTOLIC BLOOD PRESSURE: 122 MMHG | OXYGEN SATURATION: 98 % | DIASTOLIC BLOOD PRESSURE: 76 MMHG | TEMPERATURE: 97.2 F

## 2023-01-03 DIAGNOSIS — Z01.818 PRE-OP TESTING: Primary | ICD-10-CM

## 2023-01-03 DIAGNOSIS — R94.31 ABNORMAL EKG: ICD-10-CM

## 2023-01-03 PROCEDURE — 99204 OFFICE O/P NEW MOD 45 MIN: CPT | Performed by: INTERNAL MEDICINE

## 2023-01-03 NOTE — PATIENT INSTRUCTIONS
Testing   Echo**call office 3-5 days after testing is completed for results**       New Location Address- projected for the month of February 2023    [facility] [address], [place] [address]

## 2023-01-03 NOTE — LETTER
1/3/2023    Patient: Abner Dutta   YOB: 1985   Date of Visit: 1/3/2023     JUANCARLOS Torres  [id number] [place] [id number]  [address] [place]    Dear JUANCARLOS Torres,      Thank you for referring Ms. Devon Salcido to [address] [place] [address] for evaluation. My notes for this consultation are attached. If you have questions, please do not hesitate to call me. I look forward to following your patient along with you.       Sincerely,    Phoebe Arnold MD

## 2023-01-03 NOTE — PROGRESS NOTES
Identified pt with two pt identifiers(name and ). Reviewed record in preparation for visit and have obtained necessary documentation. Mitchell Davison presents today for   Chief Complaint   Patient presents with    Surgical Clearance       Pt denies  DIZZINESS, SOB, CHEST PAIN/ PRESSURE, FATIGUE/WEAKNESS, HEADACHES, SWELLING. Devon Salcido preferred language for health care discussion is english/other. Personal Protective Equipment:   Personal Protective Equipment was used including: mask-surgical and hands-gloves. Patient was placed on no precaution(s). Patient was masked. Precautions:   Patient currently on None  Patient currently roomed with door closed. Is someone accompanying this pt? no    Is the patient using any DME equipment during [address]? no    Depression Screening:  3 most recent PHQ Screens 1/3/2023   PHQ Not Done -   Little interest or pleasure in doing things Not at all   Feeling down, depressed, irritable, or hopeless Not at all   Total Score PHQ 2 0       Learning Assessment:  Learning Assessment 2/3/2016   PRIMARY LEARNER Patient   HIGHEST LEVEL OF EDUCATION - PRIMARY LEARNER  SOME COLLEGE   BARRIERS PRIMARY LEARNER NONE   PRIMARY LANGUAGE ENGLISH   LEARNER PREFERENCE PRIMARY VIDEOS     READING   ANSWERED BY patient   RELATIONSHIP SELF       Abuse Screening:  Abuse Screening Questionnaire 1/3/2023   Do you ever feel afraid of your partner? N   Are you in a relationship with someone who physically or mentally threatens you? N   Is it safe for you to go home? Y       Fall Risk  Fall Risk Assessment, last 12 mths 4/3/2018   Able to walk? Yes   Fall in past 12 months? No       Pt currently taking Anticoagulant therapy? no  Pt currently taking Antiplatelet therapy? no    Coordination of Care:  1. Have you been to the ER, urgent care clinic since your last visit? Hospitalized since your last visit? no    2.  Have you seen or consulted any other health care providers outside of the [address] since your last visit? Include any pap smears or colon screening. No       Please see Red banners under Allergies and Med Rec to remove outside inquires. All correct information has been verified with patient and added to chart.      Medication's patient's would liked removed has been marked not taking to be removed per Verbal order and read back per Yimi Acuna MD

## 2023-01-03 NOTE — PROGRESS NOTES
[facility]    Ms. Chato Palma is 30 female with history of morbid obesity    Patient is here today for cardiac evaluation  She denies prior history of MI or CHF  Patient is considering gastric sleeve surgery for morbid obesity and DJD    He denies any chest pain or chest tightness. She can climb 3 flight of stairs without any chest pain or chest tightness. She does not have any significant coronary swelling. She denies any palpitation, presyncope or syncope. She denies any nausea vomiting or diaphoresis. Denies any nausea, vomiting, abdominal pain, fever, chills, sputum production. No hematuria or other bleeding complaints    Past Medical History:   Diagnosis Date    Deviated septum 2012    Multiple nasal polyps 2012    removed        Review of Systems:  Cardiac symptoms as noted above in HPI. All others negative. Denies fatigue, malaise, skin rash, joint pain, blurring vision, photophobia, neck pain, hemoptysis, chronic cough, nausea, vomiting, hematuria, burning micturition, BRBPR, chronic headaches. Current Outpatient Medications   Medication Sig    diclofenac (VOLTAREN) 1 % gel APPLY 4 GM TO THE AFFECTED AREA FOUR TIMES DAILY    fluticasone propionate (FLONASE) 50 mcg/actuation nasal spray USE 2 SPRAYS IN EACH NOSTRIL EVERY NIGHT AT BEDTIME    ergocalciferol (ERGOCALCIFEROL) 1,250 mcg (50,000 unit) capsule TAKE 1 CAPSULE BY MOUTH EVERY 7 DAYS    sertraline (ZOLOFT) 50 mg tablet Take 1 Tablet by mouth in the morning. levonorgestreL (MIRENA) 20 mcg/24 hours (5 yrs) 52 mg IUD 1 Device by IntraUTERine route once. No current facility-administered medications for this visit.        Past Surgical History:   Procedure Laterality Date    HX TONSILLECTOMY  2008    HX WISDOM TEETH EXTRACTION  2010       Allergies and Sensitivities:  Allergies   Allergen Reactions    Aspirin Anaphylaxis     Has had ibuprofen without severe reactions Family History:  Family History   Problem Relation Age of Onset    Diabetes Mother        Social History:  Social History     Tobacco Use    Smoking status: Never    Smokeless tobacco: Never   Vaping Use    Vaping Use: Never used   Substance Use Topics    Alcohol use: No     Alcohol/week: 0.0 standard drinks    Drug use: No     She  reports that she has never smoked. She has never used smokeless tobacco.  She  reports no history of alcohol use. Physical Exam:  BP Readings from Last 3 Encounters:   01/03/23 122/76   11/07/22 134/77   09/06/22 (!) 148/100         Pulse Readings from Last 3 Encounters:   01/03/23 76   11/07/22 77   09/06/22 71          Wt Readings from Last 3 Encounters:   01/03/23 122.5 kg (270 lb)   12/05/22 125.2 kg (276 lb)   11/07/22 123.8 kg (273 lb)       Constitutional: Oriented to person, place, and time. HENT: Head: Normocephalic and atraumatic. Eyes: Conjunctivae and extraocular motions are normal.   Neck: No JVD present. Carotid bruit is not appreciated. Cardiovascular: Regular rhythm. No murmur, gallop or rubs appreciated  Lung: Breath sounds normal. No respiratory distress. No ronchi or rales appreciated  Abdominal: No tenderness. No rebound and no guarding. Musculoskeletal: There is no lower extremity edema. No cynosis  Lymphadenopathy:  No cervical or supraclavicular adenopathy appriciated. Neurological: No gross motor deficit noted. Skin: No visible skin rash noted. No Ear discharge noted  Psychiatric: Normal mood and affect.      LABS:   @  Lab Results   Component Value Date/Time    WBC 6.7 08/08/2022 03:42 PM    HGB 12.5 08/08/2022 03:42 PM    HCT 37.5 08/08/2022 03:42 PM    PLATELET 436 20/93/5687 03:42 PM    MCV 86 08/08/2022 03:42 PM     Lab Results   Component Value Date/Time    Sodium 138 08/08/2022 03:42 PM    Potassium 4.0 08/08/2022 03:42 PM    Chloride 100 08/08/2022 03:42 PM    CO2 22 08/08/2022 03:42 PM    Glucose 93 08/08/2022 03:42 PM    BUN 7 2022 03:42 PM    Creatinine 0.72 2022 03:42 PM     Lipids Latest Ref Rng & Units 2022   Chol, Total 100 - 199 mg/dL 182 160 140   HDL >39 mg/dL 49 40 36(L)   LDL 0 - 99 mg/dL 121(H) 104(H) 91   Trig 0 - 149 mg/dL 63 82 66   Some recent data might be hidden     Lab Results   Component Value Date/Time    ALT (SGPT) 19 2022 03:42 PM     Lab Results   Component Value Date/Time    Hemoglobin A1c 6.1 (H) 2022 03:42 PM     Lab Results   Component Value Date/Time    TSH 3.190 2022 12:00 AM     EK2022: Sinus rhythm at 81 bpm.  Normal [place] and QRS intervals. Late R wave transition. T wave inversion in lead III otherwise normal EKG    STRESS TEST (EST, PHARM, NUC, ECHO etc)    CATHETERIZATION    IMPRESSION & PLAN:  Ms. Mikal Portillo is a 45-year-old female    Ms. Salcido is considering gastric sleeve surgery for morbid obesity  Currently patient does not appear to be having any symptoms concerning for angina. No evidence of fluid overload on exam  She denies prior history of MI, CHF, CKD, diabetes or stroke  EKG showed T wave inversion in lead III otherwise no abnormality  Will order echo to rule out any wall motion abnormality. If echo is unremarkable, I do not suspect patient will need any further cardiac work-up    Final decision depending on echocardiogram finding. Importance of diet and exercise was discussed with patient. This plan was discussed with patient who is in agreement. Thank you for allowing me to participate in patient care. Please feel free to call me if you have any question or concern. Troy Marshall MD  Please note: This document has been produced using voice recognition software. Unrecognized errors in transcription may be present.

## 2023-01-12 ENCOUNTER — DOCUMENTATION ONLY (OUTPATIENT)
Dept: BARIATRICS/WEIGHT MGMT | Age: 38
End: 2023-01-12

## 2023-01-12 ENCOUNTER — HOSPITAL ENCOUNTER (OUTPATIENT)
Dept: BARIATRICS/WEIGHT MGMT | Age: 38
Discharge: HOME | End: 2023-01-12

## 2023-01-12 NOTE — PROGRESS NOTES
[address] [place] [address] [facility], [address]    Patient's Name: Josh Gaming   Age: 30 y.o. YOB: 1985   Sex: female    Date:   1/12/2023        Session: 5 of  6  Revision:     Surgeon:  Dr. Evita Stock    Height: 5 f 1   Weight:    270      Lbs. BMI:    Pounds Lost since last month: 0                 Pounds Gained since last month: 0    Starting Weight: 274     Previous Months Weight: 270  Overall Pounds Lost: 4   Overall Pounds Gained: 0    Do you smoke? None    Alcohol intake:  Number of drinks at a time:  None  Number of times a week: None    Class Guidelines    Guidelines are reviewed with patient at the start of every class. 1. Patient understands that weight loss trial classes must be consecutive. Patient understands if they miss a class, it is their responsibility to contact me to reschedule class. I will reach out to patient after their first no show. 2.  Patient understands the expectations that weight maintenance/weight loss is expected during the classes. Failure to demonstrate changes may result in one extra month of weight loss trial, followed by going back to see the surgeon. 3. Patient is also instructed to be doing their labs, blood work, psych visit, support group and any other test that the surgeon has used while they are working on their weight loss trial.    Other Pertinent Information:     Patient has attended a support group meeting. Changes Made Since Last Class: Gave up soda. Less carbohydrates    Eating Habits and Behaviors      Today we reviewed key diet principles. We talked about snack ideas that would focus more on protein. We also talked about the benefits of filling up on protein first and keeping the daily carbohydrate intake to less than 75 grams per day. Patient was instructed to increase fluid intake to 64 ounces per day and stop all carbonation, caffeine, and sugary drinks. During class, we talked about the importance of getting on a routine of eating 3 meals a day, eating within one hour of waking up, and not going longer than 4 hours without fueling the body again. I also talked with patient about some meal ideas. Patient is currently eating 4 meals per day. Meals focus on protein, vegetables, and carbohydrates. Patient is encouraged to work on cutting starches out. Patient is encouraged to continue to work on getting 64 ounces of fluid per day. Patient is currently drinking 50 ounces of water, 6 ounces of caffeine. Patient has been instructed to stop all liquid calories. We talked about snacks in class that are protein based and cutting out the empty carbohydrates. Patient was also given ideas of different protein shake that could be used as a snack or meal replacement. Physical Activity/Exercise    Comments:     Currently for exercise, patient is started yoga. We talked about activities for patient to do, including walking, swimming, or chair exercises. Goals have been set. Behavior Modification       Comments:   During class, I reviewed a power point with patients called, \"Assessing Your Readiness to Change. \"  During this power point, patient was asked to self-evaluate themselves. At the end, we tallied the scores to determine how ready they are to make changes for the surgery. For the New Year's, I had patient set New Year's resolutions, including a food-related goal, exercise-related goal, and behavior goal.  Patient was encouraged to track the goals on a daily basis using the check off list I provided. Goals should be SMART, specific, measurable, attainable, realistic, and time-orientated. Patient's Goals are:  1. Behavior-Related Goal: No eating in front of TV     2. Food-related goal: Stop fast food completely     3. Exercise-related goal: 15 minutes 4 x a week.       Tha Durant [address]  1/12/2023

## 2023-01-18 ENCOUNTER — OFFICE VISIT (OUTPATIENT)
Dept: FAMILY MEDICINE CLINIC | Age: 38
End: 2023-01-18
Payer: MEDICAID

## 2023-01-18 VITALS
WEIGHT: 265.8 LBS | DIASTOLIC BLOOD PRESSURE: 90 MMHG | RESPIRATION RATE: 17 BRPM | BODY MASS INDEX: 45.38 KG/M2 | HEIGHT: 64 IN | HEART RATE: 71 BPM | TEMPERATURE: 97.6 F | SYSTOLIC BLOOD PRESSURE: 136 MMHG | OXYGEN SATURATION: 98 %

## 2023-01-18 DIAGNOSIS — Z97.5 IUD (INTRAUTERINE DEVICE) IN PLACE: ICD-10-CM

## 2023-01-18 DIAGNOSIS — R73.03 PREDIABETES: ICD-10-CM

## 2023-01-18 DIAGNOSIS — R03.0 ELEVATED BP WITHOUT DIAGNOSIS OF HYPERTENSION: ICD-10-CM

## 2023-01-18 DIAGNOSIS — E78.00 PURE HYPERCHOLESTEROLEMIA: ICD-10-CM

## 2023-01-18 DIAGNOSIS — N80.9 ENDOMETRIOSIS: Primary | ICD-10-CM

## 2023-01-18 DIAGNOSIS — E66.01 OBESITY, MORBID (HCC): ICD-10-CM

## 2023-01-18 PROCEDURE — 99203 OFFICE O/P NEW LOW 30 MIN: CPT

## 2023-01-18 NOTE — PATIENT INSTRUCTIONS
Send [employer] message to Thayer abhay or Marisa Tovar requesting earlier preop clearance visit.
standing/walking

## 2023-01-18 NOTE — PROGRESS NOTES
Juan Carlos Diaz is a 30 y.o. presents today for   Chief Complaint   Patient presents with    New Patient     Est care     Is someone accompanying this pt? No    Is the patient using any DME equipment during OV? No    Visit Vitals  BP (!) 136/90 (BP 1 Location: Right upper arm, BP Patient Position: Sitting, BP Cuff Size: Large adult)   Pulse 71   Temp 97.6 °F (36.4 °C) (Temporal)   Resp 17   Ht 5' 4\" (1.626 m)   Wt 265 lb 12.8 oz (120.6 kg)   SpO2 98%   BMI 45.62 kg/m²       Depression Screening:   3 most recent PHQ Screens 1/18/2023   PHQ Not Done -   Little interest or pleasure in doing things Not at all   Feeling down, depressed, irritable, or hopeless Not at all   Total Score PHQ 2 0       Health Maintenance: reviewed and discussed and ordered per Provider. Health Maintenance Due   Topic Date Due    Hepatitis C Screening  Never done    COVID-19 Vaccine (1) Never done    Flu Vaccine (1) 08/01/2022         Coordination of Care:   1. \"Have you been to the ER, urgent care clinic since your last visit? Hospitalized since your last visit? \" No    2. \"Have you seen or consulted any other health care providers outside of the [address] [place] [address] since your last visit? \" No     3. For patients aged 39-70: Has the patient had a colonoscopy / FIT/ Cologuard? NA - based on age    If the patient is female:    4. For patients aged 41-77: Has the patient had a mammogram within the past 2 years? NA - based on age or sex    11. For patients aged 21-65: Has the patient had a pap smear? Yes - no Care Gap present     Advanced Directive:  1. Do you have an Advanced Directive? No     2. Would you like information on Advanced Directives?  No    MARIS Garay

## 2023-01-18 NOTE — PROGRESS NOTES
Devon Salcido (: 1985) is a 30 y.o. female, new patient, here for evaluation of the following chief complaint(s):  New Patient (Est care)         Subjective:     HPI:  Patient presents to establish care with new provider. Previous patient of LESLIE Tenorio, [address]. She reports that she has history of tension headaches that she gets once every other week. Associated symptoms with headaches include nausea, and light sensitivity. She reports that she gets intermittent blurred vision with headache. Denies numbness or tingling. She plans to have bariatric surgery with Dr. Zeynep Orozco, date to be determined. Patient was referred to cardiology for abnormal preop EKG. She has appointment with Dr. Salbador Bailey on 2023 for echo. Past Medical History:   Diagnosis Date    Arthritis 2021    Osteoarthritis in hips with bone spurs    Autoimmune disease ([facility] [address]     Endometriosis    Chronic pain 2021    In left hip due to osteoarthritis    Depression Since early     Deviated septum 2012    Multiple nasal polyps 2012    removed        Past Surgical History:   Procedure Laterality Date    HX TONSILLECTOMY      HX WISDOM TEETH EXTRACTION         ROS:    General: negative for - chills, fever, weight changes or malaise  HEENT: no sore throat, nasal congestion, vision problems or ear problems  Respiratory: no cough, shortness of breath, or wheezing  Cardiovascular: no chest pain, palpitations, or dyspnea on exertion  Gastrointestinal: no abdominal pain, N/V, change in bowel habits  Musculoskeletal: no back pain or joint pain  Neurological: no headache or dizziness  Endo:  No polyuria or polydipsia  : no urinary  Psychological: negative for - anxiety, depression, sleeps issues       Objective:     Blood pressure (!) 136/90, pulse 71, temperature 97.6 °F (36.4 °C), temperature source Temporal, resp.  rate 17, height 5' 4\" (1.626 m), weight 265 lb 12.8 oz (120.6 kg), last menstrual period 01/02/2023, SpO2 98 %. Physical Exam:  Patient appears well nourished, alert, oriented x 3, in no distress. ENT normal.  Neck supple. No adenopathy or thyromegaly. TETE. Lungs are clear to auscultation bilaterally. Breathing is unlabored and regular rhythm. No wheezes, no rhonchi. Cardiovascular, S1 and S2 normal, no murmurs, regular rate and rhythm. Chest wall negative for tenderness  Abdomen is soft without tenderness, no guarding  /Anorectal, deferred. Muscleskeletal, no swelling, no tenderness, no injury. Extremities show no edema bilaterally. Neurological is normal without focal findings. Skin: no concerning lesions. Psych: normal affect. Mood good. Oriented x 3. Assessment & Plan:      Diagnoses and all orders for this visit:    1. Endometriosis  -     REFERRAL TO OBSTETRICS AND GYNECOLOGY    2. IUD (intrauterine device) in place  -     REFERRAL TO OBSTETRICS AND GYNECOLOGY    3. Obesity, morbid ([address]  -     TSH 3RD GENERATION; Future  -     METABOLIC PANEL, BASIC; Future  -     CBC WITH AUTOMATED DIFF; Future    4. Prediabetes  -     LIPID PANEL; Future  -     MICROALBUMIN, UR, RAND W/ MICROALB/CREAT RATIO; Future  -     URINALYSIS W/ RFLX MICROSCOPIC; Future  -     HEMOGLOBIN A1C WITH EAG; Future  -     TSH 3RD GENERATION; Future  -     METABOLIC PANEL, BASIC; Future  -     CBC WITH AUTOMATED DIFF; Future    5. Pure hypercholesterolemia    6. Elevated BP without diagnosis of hypertension  Assessment & Plan:      Consider adding BP medication after review of labs. Other orders  -     CVD REPORT         Follow-up and Dispositions    Return in 3 months (on 4/18/2023) for physical                       .            On this date 01/18/2023 I have spent 30 minutes reviewing previous notes, test results and face to face with the patient discussing the diagnosis and importance of compliance with the treatment plan as well as documenting on the day of the visit.     An electronic signature was used to authenticate this note.   -- Rosie Trevino, SARINAP

## 2023-01-19 ENCOUNTER — TELEPHONE (OUTPATIENT)
Dept: CARDIOLOGY CLINIC | Age: 38
End: 2023-01-19

## 2023-01-20 LAB
ALBUMIN/CREAT UR: 7 MG/G CREAT (ref 0–29)
APPEARANCE UR: CLEAR
BASOPHILS # BLD AUTO: 0 X10E3/UL (ref 0–0.2)
BASOPHILS NFR BLD AUTO: 0 %
BILIRUB UR QL STRIP: NEGATIVE
BUN SERPL-MCNC: 10 MG/DL (ref 6–20)
BUN/CREAT SERPL: 12 (ref 9–23)
CALCIUM SERPL-MCNC: 9.3 MG/DL (ref 8.7–10.2)
CHLORIDE SERPL-SCNC: 99 MMOL/L (ref 96–106)
CHOLEST SERPL-MCNC: 185 MG/DL (ref 100–199)
CO2 SERPL-SCNC: 26 MMOL/L (ref 20–29)
COLOR UR: YELLOW
CREAT SERPL-MCNC: 0.81 MG/DL (ref 0.57–1)
CREAT UR-MCNC: 109.8 MG/DL
EGFR: 97 ML/MIN/1.73
EOSINOPHIL # BLD AUTO: 0.1 X10E3/UL (ref 0–0.4)
EOSINOPHIL NFR BLD AUTO: 1 %
ERYTHROCYTE [DISTWIDTH] IN BLOOD BY AUTOMATED COUNT: 13.1 % (ref 11.7–15.4)
EST. AVERAGE GLUCOSE BLD GHB EST-MCNC: 123 MG/DL
GLUCOSE SERPL-MCNC: 86 MG/DL (ref 70–99)
GLUCOSE UR QL STRIP: NEGATIVE
HBA1C MFR BLD: 5.9 % (ref 4.8–5.6)
HCT VFR BLD AUTO: 38.1 % (ref 34–46.6)
HDLC SERPL-MCNC: 51 MG/DL
HGB BLD-MCNC: 12.3 G/DL (ref 11.1–15.9)
HGB UR QL STRIP: NEGATIVE
IMM GRANULOCYTES # BLD AUTO: 0 X10E3/UL (ref 0–0.1)
IMM GRANULOCYTES NFR BLD AUTO: 0 %
IMP & REVIEW OF LAB RESULTS: NORMAL
KETONES UR QL STRIP: NEGATIVE
LDLC SERPL CALC-MCNC: 124 MG/DL (ref 0–99)
LEUKOCYTE ESTERASE UR QL STRIP: NEGATIVE
LYMPHOCYTES # BLD AUTO: 2.9 X10E3/UL (ref 0.7–3.1)
LYMPHOCYTES NFR BLD AUTO: 48 %
MCH RBC QN AUTO: 28.1 PG (ref 26.6–33)
MCHC RBC AUTO-ENTMCNC: 32.3 G/DL (ref 31.5–35.7)
MCV RBC AUTO: 87 FL (ref 79–97)
MICRO URNS: NORMAL
MICROALBUMIN UR-MCNC: 7.2 UG/ML
MONOCYTES # BLD AUTO: 0.3 X10E3/UL (ref 0.1–0.9)
MONOCYTES NFR BLD AUTO: 5 %
NEUTROPHILS # BLD AUTO: 2.8 X10E3/UL (ref 1.4–7)
NEUTROPHILS NFR BLD AUTO: 46 %
NITRITE UR QL STRIP: NEGATIVE
PH UR STRIP: 7 [PH] (ref 5–7.5)
PLATELET # BLD AUTO: 364 X10E3/UL (ref 150–450)
POTASSIUM SERPL-SCNC: 4.5 MMOL/L (ref 3.5–5.2)
PROT UR QL STRIP: NEGATIVE
RBC # BLD AUTO: 4.37 X10E6/UL (ref 3.77–5.28)
SODIUM SERPL-SCNC: 137 MMOL/L (ref 134–144)
SP GR UR STRIP: 1.02 (ref 1–1.03)
TRIGL SERPL-MCNC: 53 MG/DL (ref 0–149)
TSH SERPL DL<=0.005 MIU/L-ACNC: 2.23 UIU/ML (ref 0.45–4.5)
UROBILINOGEN UR STRIP-MCNC: 0.2 MG/DL (ref 0.2–1)
VLDLC SERPL CALC-MCNC: 10 MG/DL (ref 5–40)
WBC # BLD AUTO: 6.1 X10E3/UL (ref 3.4–10.8)

## 2023-02-02 ENCOUNTER — TELEPHONE (OUTPATIENT)
Dept: FAMILY MEDICINE CLINIC | Age: 38
End: 2023-02-02

## 2023-02-02 NOTE — TELEPHONE ENCOUNTER
Spoke with the patient and she have been scheduled to come into the office on 02/06/2023 at 1:30p.m.

## 2023-02-02 NOTE — TELEPHONE ENCOUNTER
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

## 2023-02-06 ENCOUNTER — OFFICE VISIT (OUTPATIENT)
Dept: FAMILY MEDICINE CLINIC | Age: 38
End: 2023-02-06
Payer: MEDICAID

## 2023-02-06 VITALS
SYSTOLIC BLOOD PRESSURE: 138 MMHG | OXYGEN SATURATION: 97 % | HEIGHT: 64 IN | WEIGHT: 270 LBS | TEMPERATURE: 97.1 F | RESPIRATION RATE: 17 BRPM | DIASTOLIC BLOOD PRESSURE: 90 MMHG | BODY MASS INDEX: 46.1 KG/M2 | HEART RATE: 85 BPM

## 2023-02-06 DIAGNOSIS — R73.03 PREDIABETES: ICD-10-CM

## 2023-02-06 DIAGNOSIS — I10 PRIMARY HYPERTENSION: Primary | ICD-10-CM

## 2023-02-06 DIAGNOSIS — G44.219 EPISODIC TENSION-TYPE HEADACHE, NOT INTRACTABLE: ICD-10-CM

## 2023-02-06 DIAGNOSIS — E78.5 DYSLIPIDEMIA, GOAL LDL BELOW 100: ICD-10-CM

## 2023-02-06 PROBLEM — G44.211 INTRACTABLE EPISODIC TENSION-TYPE HEADACHE: Status: ACTIVE | Noted: 2023-02-06

## 2023-02-06 RX ORDER — AMLODIPINE BESYLATE 2.5 MG/1
2.5 TABLET ORAL DAILY
Qty: 60 TABLET | Refills: 0 | Status: SHIPPED | OUTPATIENT
Start: 2023-02-06 | End: 2023-02-06

## 2023-02-06 RX ORDER — CYCLOBENZAPRINE HCL 5 MG
5 TABLET ORAL 2 TIMES DAILY
Qty: 180 TABLET | Refills: 0 | Status: SHIPPED | OUTPATIENT
Start: 2023-02-06

## 2023-02-06 RX ORDER — ALBUTEROL SULFATE 90 UG/1
1 AEROSOL, METERED RESPIRATORY (INHALATION)
Qty: 18 G | Refills: 2 | Status: SHIPPED | OUTPATIENT
Start: 2023-02-06

## 2023-02-06 RX ORDER — SUMATRIPTAN 50 MG/1
50 TABLET, FILM COATED ORAL
Qty: 30 TABLET | Refills: 1 | Status: SHIPPED | OUTPATIENT
Start: 2023-02-06

## 2023-02-06 RX ORDER — AMLODIPINE BESYLATE 5 MG/1
2.5 TABLET ORAL DAILY
Qty: 60 TABLET | Refills: 0 | Status: SHIPPED | OUTPATIENT
Start: 2023-02-06 | End: 2023-02-06

## 2023-02-06 RX ORDER — ATORVASTATIN CALCIUM 20 MG/1
20 TABLET, FILM COATED ORAL DAILY
Qty: 90 TABLET | Refills: 3 | Status: SHIPPED | OUTPATIENT
Start: 2023-02-06

## 2023-02-06 RX ORDER — METFORMIN HYDROCHLORIDE 500 MG/1
500 TABLET ORAL 2 TIMES DAILY WITH MEALS
Qty: 90 TABLET | Refills: 0 | Status: SHIPPED | OUTPATIENT
Start: 2023-02-06

## 2023-02-06 RX ORDER — METOPROLOL TARTRATE 25 MG/1
25 TABLET, FILM COATED ORAL 2 TIMES DAILY
Qty: 180 TABLET | Refills: 1 | Status: SHIPPED | OUTPATIENT
Start: 2023-02-06

## 2023-02-06 NOTE — PROGRESS NOTES
Stephanie Varela is a 30 y.o. presents today for   Chief Complaint   Patient presents with    Headache     Is someone accompanying this pt? Yes, daughter. Is the patient using any DME equipment during OV? No.    Visit Vitals  BP (!) 138/90 (BP 1 Location: Right upper arm, BP Patient Position: Sitting, BP Cuff Size: Large adult)   Pulse 85   Temp 97.1 °F (36.2 °C) (Temporal)   Resp 17   Ht 5' 4\" (1.626 m)   Wt 270 lb (122.5 kg)   SpO2 97%   BMI 46.35 kg/m²       Depression Screening:   3 most recent PHQ Screens 2/6/2023   PHQ Not Done -   Little interest or pleasure in doing things Not at all   Feeling down, depressed, irritable, or hopeless Not at all   Total Score PHQ 2 0       Health Maintenance: reviewed and discussed and ordered per Provider. Health Maintenance Due   Topic Date Due    Hepatitis C Screening  Never done    COVID-19 Vaccine (1) Never done    Flu Vaccine (1) 08/01/2022         Coordination of Care:   1. \"Have you been to the ER, urgent care clinic since your last visit? Hospitalized since your last visit? \" No    2. \"Have you seen or consulted any other health care providers outside of the [address] [place] [address] since your last visit? \" No     3. For patients aged 39-70: Has the patient had a colonoscopy / FIT/ Cologuard? NA - based on age    If the patient is female:    4. For patients aged 41-77: Has the patient had a mammogram within the past 2 years? NA - based on age or sex    11. For patients aged 21-65: Has the patient had a pap smear? Yes - no Care Gap present     Advanced Directive:  1. Do you have an Advanced Directive? No     2. Would you like information on Advanced Directives?  No    MARIS Garay

## 2023-02-06 NOTE — PROGRESS NOTES
Kem Garcia (: 1985) is a 30 y.o. female, established patient, here for evaluation of the following chief complaint(s):  Headache         Subjective:     HPI:  She is still having headaches 3-4 times a week. She reports slight blurred in vision sometimes blurred with headaches. 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    [facility]     Endometriosis    Chronic pain 2021    In left hip due to osteoarthritis    Depression Since early     Deviated septum 2012    Multiple nasal polyps 2012    removed        Past Surgical History:   Procedure Laterality Date    HX TONSILLECTOMY      HX WISDOM TEETH EXTRACTION         ROS:    General: negative for - chills, fever, weight changes or malaise  HEENT: no sore throat, nasal congestion, vision problems or ear problems  Respiratory: no cough, shortness of breath, or wheezing  Cardiovascular: no chest pain, palpitations, or dyspnea on exertion  Gastrointestinal: no abdominal pain, N/V, change in bowel habits  Musculoskeletal: no back pain or joint pain  Neurological: no headache or dizziness  Endo:  No polyuria or polydipsia  : no urinary  Psychological: negative for - anxiety, depression, sleeps issues       Objective:     Blood pressure (!) 138/90, pulse 85, temperature 97.1 °F (36.2 °C), temperature source Temporal, resp. rate 17, height 5' 4\" (1.626 m), weight 270 lb (122.5 kg), SpO2 97 %. Physical Exam:  Patient appears well nourished, alert, oriented x 3, in no distress. ENT normal.  Neck supple. No adenopathy or thyromegaly. TETE. Lungs are clear to auscultation bilaterally. Breathing is unlabored and regular rhythm. No wheezes, no rhonchi. Cardiovascular, S1 and S2 normal, no murmurs, regular rate and rhythm.    Chest wall negative for tenderness  Abdomen is soft without tenderness, no guarding  /Anorectal, deferred. Muscleskeletal, no swelling, no tenderness, no injury. Extremities show no edema bilaterally. Neurological is normal without focal findings. Skin: no concerning lesions. Psych: normal affect. Mood good. Oriented x 3. Assessment & Plan:      Diagnoses and all orders for this visit:    1. Primary hypertension  -     metoprolol tartrate (LOPRESSOR) 25 mg tablet; Take 1 Tablet by mouth two (2) times a day. Indications: high blood pressure  -     METABOLIC PANEL, BASIC; Future  -     CBC WITH AUTOMATED DIFF; Future    2. Prediabetes  -     metFORMIN (GLUCOPHAGE) 500 mg tablet; Take 1 Tablet by mouth two (2) times daily (with meals). 3. Dyslipidemia, goal LDL below 100  -     atorvastatin (LIPITOR) 20 mg tablet; Take 1 Tablet by mouth daily.  -     LIPID PANEL; Future  -     HEPATIC FUNCTION PANEL; Future    4. Episodic tension-type headache, not intractable  -     cyclobenzaprine (FLEXERIL) 5 mg tablet; Take 1 Tablet by mouth two (2) times a day. -     SUMAtriptan (IMITREX) 50 mg tablet; Take 1 Tablet by mouth daily as needed for Migraine. Other orders  -     albuterol (PROVENTIL HFA, VENTOLIN HFA, PROAIR HFA) 90 mcg/actuation inhaler; Take 1 Puff by inhalation every six (6) hours as needed for Wheezing. Follow-up and Dispositions    Return in about 2 weeks (around 2/20/2023) for nurse visit for BP check                    3 months cholesterol. On this date 02/06/2023 I have spent 30 minutes reviewing previous notes, test results and face to face with the patient discussing the diagnosis and importance of compliance with the treatment plan as well as documenting on the day of the visit. An electronic signature was used to authenticate this note.   -- ISIDORO Cross

## 2023-02-09 ENCOUNTER — HOSPITAL ENCOUNTER (OUTPATIENT)
Dept: BARIATRICS/WEIGHT MGMT | Age: 38
Discharge: HOME | End: 2023-02-09

## 2023-02-09 ENCOUNTER — DOCUMENTATION ONLY (OUTPATIENT)
Dept: BARIATRICS/WEIGHT MGMT | Age: 38
End: 2023-02-09

## 2023-02-09 NOTE — PROGRESS NOTES
[address] [facility], [address]    Patient's Name: Dannie Wilhelm   Age: 30 y.o. YOB: 1985   Sex: female    Date:   2/9/2023              Session: 6 of 6  Revision:     Surgeon:  Dr. Christian Bazzi    Height: 5  f1   Weight:    270      Lbs. BMI:    Pounds Lost since last month: 0                 Pounds Gained since last month: 0    Starting Weight: 274     Previous Months Weight: 270  Overall Pounds Lost: 4   Overall Pounds Gained: 0    Do you smoke? None    Alcohol intake:  Number of drinks at a time:  None  Number of times a week: None    Class Guidelines    Guidelines are reviewed with patient at the start of every class. 1. Patient understands that weight loss trial classes must be consecutive. Patient understands if they miss a class, it is their responsibility to contact me to reschedule class. I will reach out to patient after their first no show. 2.  Patient understands the expectations that weight maintenance/weight loss is expected during the classes. Failure to demonstrate changes may result in one extra month of weight loss trial, followed by going back to see the surgeon. 3. Patient is also instructed to be doing their labs, blood work, psych visit, support group and any other test that the surgeon has used while they are working on their weight loss trial.   Patient understands that they CAN NOT gain any weight during the weight loss trial.  Gaining weight will result in extra classes    Other Pertinent Information:     Changes Made Since Last Class:     Eating Habits and Behaviors      Today we started off class talking about the Key Diet Principles. Patient was encouraged to start drinking 64 ounces of fluid per day. Patient was encouraged to start cutting out soda, caffeine, carbonation, sweet tea, fruit juice, and fruit smoothies.  Patient was also instructed to fill up on meat, fish, vegetables, eggs, cheese, and some fruit. We also talked about protein drinks and patient was encouraged to start trying these, using them either for a meal replacement or a substitute for a current meal, which may be higher in carbohydrates. We talked about ways to lower carbohydrates and start trying substitutes, such as zucchini noodles and cauliflower rice. Patient's current diet habits include:   Patient's diet history indicated: mixture of protein, vegetables, carbohydrates. Patient's portions are:  small plate. Patient is snacking on keto cookies. I have made suggestions to patient of snacks that are lower in sugar and carbohydrates and focusing more on protein based snacks. Patient is drinking 64 ounces of water and some unsweetened tea . Physical Activity/Exercise    Comments:     Currently for exercise, patient is going to the gym 1 x a week. We talked about activities for patient to do, including walking, swimming, or chair exercises. I also talked with patient about doing some strength training, which helps the metabolism, as well. Goals have been set. Behavior Modification       Comments:   I also gave a power point on Behavior Changes and Weight Loss. Some of the suggestions in the power point included food journaling. Patient was also given some strategies to follow, such as cooking just enough for the meal and not putting serving bowls on the table. Patient was also encouraged to restrict where they are eating to 1-2 locations to avoid mindless eating throughout the day. Patient was given a check off list and encouraged to set 3 goals for the month. I have really stressed to patient the importance of food journaling. Patient is eating a meal in 20 minutes. It was encouraged to try to stretch a meal out 20 minutes.     One goal that patient has set for next month is:  Completely get rid of caffeine   Go to the gym 2 x a week      Tha Nunes [address] RD  2/9/2023

## 2023-03-01 ENCOUNTER — TELEPHONE (OUTPATIENT)
Age: 38
End: 2023-03-01

## 2023-03-02 ENCOUNTER — OFFICE VISIT (OUTPATIENT)
Facility: CLINIC | Age: 38
End: 2023-03-02
Payer: MEDICAID

## 2023-03-02 VITALS
HEIGHT: 64 IN | OXYGEN SATURATION: 99 % | TEMPERATURE: 97.6 F | BODY MASS INDEX: 46.44 KG/M2 | WEIGHT: 272 LBS | HEART RATE: 74 BPM | DIASTOLIC BLOOD PRESSURE: 84 MMHG | SYSTOLIC BLOOD PRESSURE: 138 MMHG | RESPIRATION RATE: 17 BRPM

## 2023-03-02 DIAGNOSIS — E66.01 OBESITY, MORBID (HCC): Primary | ICD-10-CM

## 2023-03-02 DIAGNOSIS — F33.9 RECURRENT DEPRESSION (HCC): ICD-10-CM

## 2023-03-02 PROCEDURE — 99213 OFFICE O/P EST LOW 20 MIN: CPT

## 2023-03-02 RX ORDER — CYCLOBENZAPRINE HCL 5 MG
5 TABLET ORAL 2 TIMES DAILY
COMMUNITY
Start: 2023-02-06

## 2023-03-02 RX ORDER — ATORVASTATIN CALCIUM 20 MG/1
TABLET, FILM COATED ORAL
COMMUNITY
Start: 2023-02-06

## 2023-03-02 RX ORDER — FLUTICASONE PROPIONATE 50 MCG
SPRAY, SUSPENSION (ML) NASAL
Qty: 16 G | Refills: 2 | Status: SHIPPED | OUTPATIENT
Start: 2023-03-02

## 2023-03-02 RX ORDER — SUMATRIPTAN 50 MG/1
50 TABLET, FILM COATED ORAL DAILY PRN
COMMUNITY
Start: 2023-02-06

## 2023-03-07 NOTE — PROGRESS NOTES
Raman Ga (: 1985) is a 30 y.o. female, established patient, here for evaluation of the following chief complaint(s):  Diabetes             Subjective:     HPI:    She reports decrease in frequency and intensity of headaches. Past Medical History:   Diagnosis Date    Arthritis 2021    Osteoarthritis in hips with bone spurs    Autoimmune disease ([facility] [address]     Endometriosis    Chronic pain 2021    In left hip due to osteoarthritis    Depression Since early     Deviated septum 2012    Multiple nasal polyps 2012    removed        Past Surgical History:   Procedure Laterality Date    TONSILLECTOMY      WISDOM TOOTH EXTRACTION         ROS:    General: negative for - chills, fever, weight changes or malaise  HEENT: no sore throat, nasal congestion, vision problems or ear problems  Respiratory: no cough, shortness of breath, or wheezing  Cardiovascular: no chest pain, palpitations, or dyspnea on exertion  Gastrointestinal: no abdominal pain, N/V, change in bowel habits  Musculoskeletal: no back pain or joint pain  Neurological: no headache or dizziness  Endo:  No polyuria or polydipsia  : no urinary  Psychological: negative for - anxiety, depression, sleeps issues       Objective:     Vitals:    23 1540   BP: 138/84   Pulse: 74   Resp: 17   Temp: 97.6 °F (36.4 °C)   SpO2: 99%        Physical Exam:  Patient appears well nourished, alert, oriented x 3, in no distress. ENT normal.  Neck supple. No adenopathy or thyromegaly. JOVITA. Lungs are clear to auscultation bilaterally. Breathing is unlabored and regular rhythm. No wheezes, no rhonchi. Cardiovascular, S1 and S2 normal, no murmurs, regular rate and rhythm. Chest wall negative for tenderness  Abdomen is soft without tenderness, no guarding  /Anorectal, deferred. Muscleskeletal, no swelling, no tenderness, no injury. Extremities show no edema bilaterally.   Neurological is normal without focal findings. Skin: no concerning lesions. Psych: normal affect. Mood good. Oriented x 3. Assessment & Plan:      Kelvin was seen today for diabetes. Diagnoses and all orders for this visit:    Obesity, morbid ([address]    Recurrent depression ([address]    Other orders  -     sertraline (ZOLOFT) 50 MG tablet; Take 1 tablet by mouth daily  -     fluticasone (FLONASE) 50 MCG/ACT nasal spray; USE 2 SPRAYS IN EACH NOSTRIL EVERY NIGHT AT BEDTIME        No follow-ups on file. On this date 03/02/2023  I have spent 30 minutes reviewing previous notes, test results and face to face with the patient discussing the diagnosis and importance of compliance with the treatment plan as well as documenting on the day of the visit. An electronic signature was used to authenticate this note.   -- DENNISE Mccollum

## 2023-03-20 NOTE — TELEPHONE ENCOUNTER
This patient contacted the office for the following prescriptions to be refilled:    Medication requested :   Requested Prescriptions     Pending Prescriptions Disp Refills    metFORMIN (GLUCOPHAGE) 500 MG tablet [Pharmacy Med Name: METFORMIN 500MG TABLETS] 90 tablet      Sig: TAKE 1 TABLET BY MOUTH TWICE DAILY WITH MEALS      PCP: PANDA Curry  LOV: 3/2/2023  NOV DMA: 4/18/2023  FUTURE APPT:   Future Appointments   Date Time Provider Elisabeth Barrazai   3/24/2023  1:30 PM Misael Hernandez MD [facility] BS AMB   4/18/2023  9:00 AM PANDA Curry DMA BS AMB   1/4/2024 10:00 AM Pipo Love MD [facility] BS AMB         Thank you.

## 2023-03-24 ENCOUNTER — OFFICE VISIT (OUTPATIENT)
Age: 38
End: 2023-03-24

## 2023-03-24 VITALS
WEIGHT: 271 LBS | DIASTOLIC BLOOD PRESSURE: 89 MMHG | BODY MASS INDEX: 49.87 KG/M2 | HEART RATE: 75 BPM | HEIGHT: 62 IN | RESPIRATION RATE: 16 BRPM | SYSTOLIC BLOOD PRESSURE: 129 MMHG | OXYGEN SATURATION: 97 % | TEMPERATURE: 97.7 F

## 2023-03-24 DIAGNOSIS — Z01.818 ENCOUNTER FOR OTHER PREPROCEDURAL EXAMINATION: ICD-10-CM

## 2023-03-24 DIAGNOSIS — R73.03 PREDIABETES: ICD-10-CM

## 2023-03-24 DIAGNOSIS — E66.01 OBESITY, MORBID (HCC): Primary | ICD-10-CM

## 2023-03-24 DIAGNOSIS — E78.00 PURE HYPERCHOLESTEROLEMIA: ICD-10-CM

## 2023-03-24 NOTE — PROGRESS NOTES
Valdemar Barragan is a 30 y.o. female (: 1985) presenting to address:pre op    Chief Complaint   Patient presents with    Pre-op Exam     Bariatric        Medication list and allergies have been reviewed with Kelvin Flores and updated as of today's date. I have gone over all Medical, Surgical and Social History with Kelvin Flores and updated/added the information accordingly. 1. Have you been to the ER, urgent care clinic since your last visit? Hospitalized since your last visit? No    2. Have you seen or consulted any other health care providers outside of the [address] [place] [address] since your last visit? Include any pap smears or colon screening.  No
Since early 20's    Deviated septum 01/01/2012    Multiple nasal polyps 01/01/2012    removed      Past Surgical History:   Procedure Laterality Date    TONSILLECTOMY  2008    WISDOM TOOTH EXTRACTION  2010      Social History     Tobacco Use    Smoking status: Never    Smokeless tobacco: Never   Substance Use Topics    Alcohol use: Never      Family History   Problem Relation Age of Onset    Diabetes Other     Diabetes Mother     Diabetes Maternal Grandmother       Prior to Admission medications    Medication Sig Start Date End Date Taking?  Authorizing Provider   metFORMIN (GLUCOPHAGE) 500 MG tablet TAKE 1 TABLET BY MOUTH TWICE DAILY WITH MEALS 3/20/23   PANDA Luz   atorvastatin (LIPITOR) 20 MG tablet TAKE 1 TABLET BY MOUTH DAILY 2/6/23   Historical Provider, MD   cyclobenzaprine (FLEXERIL) 5 MG tablet Take 5 mg by mouth 2 times daily 2/6/23   Historical Provider, MD   metoprolol tartrate (LOPRESSOR) 25 MG tablet Take 25 mg by mouth 2 times daily 2/6/23   Historical Provider, MD   SUMAtriptan (IMITREX) 50 MG tablet Take 50 mg by mouth daily as needed 2/6/23   Historical Provider, MD   sertraline (ZOLOFT) 50 MG tablet Take 1 tablet by mouth daily 3/2/23   PANDA Luz   fluticasone (FLONASE) 50 MCG/ACT nasal spray USE 2 SPRAYS IN EACH NOSTRIL EVERY NIGHT AT BEDTIME 3/2/23   PANDA Luz   diclofenac sodium (VOLTAREN) 1 % GEL APPLY 4 GM TO THE AFFECTED AREA FOUR TIMES DAILY 11/28/22   Ar Automatic Reconciliation   ergocalciferol (ERGOCALCIFEROL) 1.25 MG (18060 UT) capsule TAKE 1 CAPSULE BY MOUTH EVERY 7 DAYS 9/5/22   Ar Automatic Reconciliation   levonorgestrel (MIRENA) IUD 52 mg 1 Device by IntraUTERine route once    Ar Automatic Reconciliation     Allergies   Allergen Reactions    Aspirin Anaphylaxis     Has had ibuprofen without severe reactions        Review of Systems:    Constitutional: No fever or chills  Neurologic: No headache  Eyes: No scleral icterus or irritated

## 2023-03-28 ENCOUNTER — CLINICAL DOCUMENTATION (OUTPATIENT)
Facility: HOSPITAL | Age: 38
End: 2023-03-28

## 2023-03-28 ENCOUNTER — FOLLOWUP TELEPHONE ENCOUNTER (OUTPATIENT)
Facility: HOSPITAL | Age: 38
End: 2023-03-28

## 2023-03-28 NOTE — PROGRESS NOTES
occur if they are non-compliant with the nutritional protocol and non-compliant with the vitamins. Patient has also been educated on the pre-op liquid diet for 1 week. Patient understands that failure to comply in pre-op liquid diet could result in surgery being canceled. Patient's 6 week post-op nutrition appointment has been scheduled. At this 6 week visit, RD will assess how patient is tolerating soft protein and advance to vegetables, if tolerating soft protein without difficulty. Patient will also see RD again at 9 months post-op. This visit will assess patient's compliance with current protocol, including diet, vitamins, protein shakes, and exercise. Post-op diet guidelines will be reinforced. RD is available for questions and to meet with patient outside of the 6 week and 9 month post-op visit. Ok to proceed.      Candidate for surgery: Yes  Re-evaluation Date:     Cameron Rodriguez RD    3/28/2023

## 2023-04-18 ENCOUNTER — ANESTHESIA EVENT (OUTPATIENT)
Facility: HOSPITAL | Age: 38
DRG: 403 | End: 2023-04-18
Payer: MEDICAID

## 2023-04-19 ENCOUNTER — HOSPITAL ENCOUNTER (INPATIENT)
Facility: HOSPITAL | Age: 38
LOS: 1 days | Discharge: HOME | DRG: 403 | End: 2023-04-20
Attending: SURGERY | Admitting: SURGERY
Payer: MEDICAID

## 2023-04-19 ENCOUNTER — ANESTHESIA (OUTPATIENT)
Facility: HOSPITAL | Age: 38
DRG: 403 | End: 2023-04-19
Payer: MEDICAID

## 2023-04-19 DIAGNOSIS — G89.18 ACUTE POST-OPERATIVE PAIN: Primary | ICD-10-CM

## 2023-04-19 PROBLEM — E66.01 MORBID OBESITY DUE TO EXCESS CALORIES (HCC): Status: ACTIVE | Noted: 2023-04-19

## 2023-04-19 LAB
GLUCOSE BLD STRIP.AUTO-MCNC: 93 MG/DL (ref 70–110)
HCG UR QL: NEGATIVE

## 2023-04-19 PROCEDURE — 2500000003 HC RX 250 WO HCPCS: Performed by: SURGERY

## 2023-04-19 PROCEDURE — A4216 STERILE WATER/SALINE, 10 ML: HCPCS | Performed by: SURGERY

## 2023-04-19 PROCEDURE — 6360000002 HC RX W HCPCS: Performed by: SURGERY

## 2023-04-19 PROCEDURE — 6370000000 HC RX 637 (ALT 250 FOR IP): Performed by: SURGERY

## 2023-04-19 PROCEDURE — C9290 INJ, BUPIVACAINE LIPOSOME: HCPCS | Performed by: SURGERY

## 2023-04-19 PROCEDURE — 0BQT4ZZ REPAIR DIAPHRAGM, PERCUTANEOUS ENDOSCOPIC APPROACH: ICD-10-PCS | Performed by: SURGERY

## 2023-04-19 PROCEDURE — 2700000000 HC OXYGEN THERAPY PER DAY

## 2023-04-19 PROCEDURE — 3700000000 HC ANESTHESIA ATTENDED CARE: Performed by: SURGERY

## 2023-04-19 PROCEDURE — 8E0W4CZ ROBOTIC ASSISTED PROCEDURE OF TRUNK REGION, PERCUTANEOUS ENDOSCOPIC APPROACH: ICD-10-PCS | Performed by: SURGERY

## 2023-04-19 PROCEDURE — 2720000010 HC SURG SUPPLY STERILE: Performed by: SURGERY

## 2023-04-19 PROCEDURE — 3600000019 HC SURGERY ROBOT ADDTL 15MIN: Performed by: SURGERY

## 2023-04-19 PROCEDURE — 88302 TISSUE EXAM BY PATHOLOGIST: CPT

## 2023-04-19 PROCEDURE — 1100000000 HC RM PRIVATE

## 2023-04-19 PROCEDURE — 2580000003 HC RX 258: Performed by: SURGERY

## 2023-04-19 PROCEDURE — 7100000001 HC PACU RECOVERY - ADDTL 15 MIN: Performed by: SURGERY

## 2023-04-19 PROCEDURE — 88313 SPECIAL STAINS GROUP 2: CPT

## 2023-04-19 PROCEDURE — 3700000001 HC ADD 15 MINUTES (ANESTHESIA): Performed by: SURGERY

## 2023-04-19 PROCEDURE — 43775 LAP SLEEVE GASTRECTOMY: CPT | Performed by: SURGERY

## 2023-04-19 PROCEDURE — 6360000002 HC RX W HCPCS: Performed by: NURSE ANESTHETIST, CERTIFIED REGISTERED

## 2023-04-19 PROCEDURE — 3600000009 HC SURGERY ROBOT BASE: Performed by: SURGERY

## 2023-04-19 PROCEDURE — 2580000003 HC RX 258: Performed by: NURSE ANESTHETIST, CERTIFIED REGISTERED

## 2023-04-19 PROCEDURE — 0FB24ZX EXCISION OF LEFT LOBE LIVER, PERCUTANEOUS ENDOSCOPIC APPROACH, DIAGNOSTIC: ICD-10-PCS | Performed by: SURGERY

## 2023-04-19 PROCEDURE — 7100000000 HC PACU RECOVERY - FIRST 15 MIN: Performed by: SURGERY

## 2023-04-19 PROCEDURE — C9113 INJ PANTOPRAZOLE SODIUM, VIA: HCPCS | Performed by: SURGERY

## 2023-04-19 PROCEDURE — 82962 GLUCOSE BLOOD TEST: CPT

## 2023-04-19 PROCEDURE — 81025 URINE PREGNANCY TEST: CPT

## 2023-04-19 PROCEDURE — S2900 ROBOTIC SURGICAL SYSTEM: HCPCS | Performed by: SURGERY

## 2023-04-19 PROCEDURE — 47379 UNLISTED LAPS PX LIVER: CPT | Performed by: SURGERY

## 2023-04-19 PROCEDURE — 0DB64Z3 EXCISION OF STOMACH, PERCUTANEOUS ENDOSCOPIC APPROACH, VERTICAL: ICD-10-PCS | Performed by: SURGERY

## 2023-04-19 PROCEDURE — 88307 TISSUE EXAM BY PATHOLOGIST: CPT

## 2023-04-19 PROCEDURE — 2500000003 HC RX 250 WO HCPCS: Performed by: NURSE ANESTHETIST, CERTIFIED REGISTERED

## 2023-04-19 PROCEDURE — 2709999900 HC NON-CHARGEABLE SUPPLY: Performed by: SURGERY

## 2023-04-19 PROCEDURE — 43281 LAP PARAESOPHAG HERN REPAIR: CPT | Performed by: SURGERY

## 2023-04-19 RX ORDER — ONDANSETRON 2 MG/ML
4 INJECTION INTRAMUSCULAR; INTRAVENOUS
Status: DISCONTINUED | OUTPATIENT
Start: 2023-04-19 | End: 2023-04-19 | Stop reason: HOSPADM

## 2023-04-19 RX ORDER — SUCCINYLCHOLINE/SOD CL,ISO/PF 100 MG/5ML
SYRINGE (ML) INTRAVENOUS PRN
Status: DISCONTINUED | OUTPATIENT
Start: 2023-04-19 | End: 2023-04-19 | Stop reason: SDUPTHER

## 2023-04-19 RX ORDER — ONDANSETRON 2 MG/ML
INJECTION INTRAMUSCULAR; INTRAVENOUS PRN
Status: DISCONTINUED | OUTPATIENT
Start: 2023-04-19 | End: 2023-04-19 | Stop reason: SDUPTHER

## 2023-04-19 RX ORDER — ENOXAPARIN SODIUM 100 MG/ML
40 INJECTION SUBCUTANEOUS ONCE
Status: COMPLETED | OUTPATIENT
Start: 2023-04-19 | End: 2023-04-19

## 2023-04-19 RX ORDER — SODIUM CHLORIDE 0.9 % (FLUSH) 0.9 %
5-40 SYRINGE (ML) INJECTION PRN
Status: DISCONTINUED | OUTPATIENT
Start: 2023-04-19 | End: 2023-04-19 | Stop reason: HOSPADM

## 2023-04-19 RX ORDER — FENTANYL CITRATE 50 UG/ML
50 INJECTION, SOLUTION INTRAMUSCULAR; INTRAVENOUS AS NEEDED
Status: DISCONTINUED | OUTPATIENT
Start: 2023-04-19 | End: 2023-04-19 | Stop reason: HOSPADM

## 2023-04-19 RX ORDER — PROPOFOL 10 MG/ML
INJECTION, EMULSION INTRAVENOUS PRN
Status: DISCONTINUED | OUTPATIENT
Start: 2023-04-19 | End: 2023-04-19 | Stop reason: SDUPTHER

## 2023-04-19 RX ORDER — SODIUM CHLORIDE, SODIUM LACTATE, POTASSIUM CHLORIDE, CALCIUM CHLORIDE 600; 310; 30; 20 MG/100ML; MG/100ML; MG/100ML; MG/100ML
INJECTION, SOLUTION INTRAVENOUS CONTINUOUS
Status: DISCONTINUED | OUTPATIENT
Start: 2023-04-19 | End: 2023-04-20 | Stop reason: HOSPADM

## 2023-04-19 RX ORDER — DIPHENHYDRAMINE HYDROCHLORIDE 50 MG/ML
25 INJECTION INTRAMUSCULAR; INTRAVENOUS EVERY 6 HOURS PRN
Status: DISCONTINUED | OUTPATIENT
Start: 2023-04-19 | End: 2023-04-20 | Stop reason: HOSPADM

## 2023-04-19 RX ORDER — PROCHLORPERAZINE EDISYLATE 5 MG/ML
10 INJECTION INTRAMUSCULAR; INTRAVENOUS EVERY 6 HOURS PRN
Status: DISCONTINUED | OUTPATIENT
Start: 2023-04-19 | End: 2023-04-20 | Stop reason: HOSPADM

## 2023-04-19 RX ORDER — ENOXAPARIN SODIUM 100 MG/ML
40 INJECTION SUBCUTANEOUS EVERY 12 HOURS SCHEDULED
Status: DISCONTINUED | OUTPATIENT
Start: 2023-04-19 | End: 2023-04-20 | Stop reason: HOSPADM

## 2023-04-19 RX ORDER — OXYCODONE HYDROCHLORIDE 5 MG/1
5 TABLET ORAL EVERY 4 HOURS PRN
Status: DISCONTINUED | OUTPATIENT
Start: 2023-04-19 | End: 2023-04-20 | Stop reason: HOSPADM

## 2023-04-19 RX ORDER — SODIUM CHLORIDE 0.9 % (FLUSH) 0.9 %
5-40 SYRINGE (ML) INJECTION EVERY 12 HOURS SCHEDULED
Status: DISCONTINUED | OUTPATIENT
Start: 2023-04-19 | End: 2023-04-20 | Stop reason: HOSPADM

## 2023-04-19 RX ORDER — LIDOCAINE HYDROCHLORIDE 10 MG/ML
1 INJECTION, SOLUTION EPIDURAL; INFILTRATION; INTRACAUDAL; PERINEURAL
Status: DISCONTINUED | OUTPATIENT
Start: 2023-04-19 | End: 2023-04-19 | Stop reason: HOSPADM

## 2023-04-19 RX ORDER — APREPITANT 40 MG/1
40 CAPSULE ORAL ONCE
Status: COMPLETED | OUTPATIENT
Start: 2023-04-19 | End: 2023-04-19

## 2023-04-19 RX ORDER — SODIUM CHLORIDE, SODIUM LACTATE, POTASSIUM CHLORIDE, CALCIUM CHLORIDE 600; 310; 30; 20 MG/100ML; MG/100ML; MG/100ML; MG/100ML
INJECTION, SOLUTION INTRAVENOUS CONTINUOUS
Status: DISCONTINUED | OUTPATIENT
Start: 2023-04-19 | End: 2023-04-19 | Stop reason: SDUPTHER

## 2023-04-19 RX ORDER — SODIUM CHLORIDE, SODIUM LACTATE, POTASSIUM CHLORIDE, CALCIUM CHLORIDE 600; 310; 30; 20 MG/100ML; MG/100ML; MG/100ML; MG/100ML
INJECTION, SOLUTION INTRAVENOUS CONTINUOUS
Status: DISCONTINUED | OUTPATIENT
Start: 2023-04-19 | End: 2023-04-19 | Stop reason: HOSPADM

## 2023-04-19 RX ORDER — ONDANSETRON 2 MG/ML
4 INJECTION INTRAMUSCULAR; INTRAVENOUS EVERY 6 HOURS
Status: DISCONTINUED | OUTPATIENT
Start: 2023-04-19 | End: 2023-04-20 | Stop reason: HOSPADM

## 2023-04-19 RX ORDER — SCOLOPAMINE TRANSDERMAL SYSTEM 1 MG/1
1 PATCH, EXTENDED RELEASE TRANSDERMAL
Status: DISCONTINUED | OUTPATIENT
Start: 2023-04-20 | End: 2023-04-19

## 2023-04-19 RX ORDER — DEXAMETHASONE SODIUM PHOSPHATE 4 MG/ML
INJECTION, SOLUTION INTRA-ARTICULAR; INTRALESIONAL; INTRAMUSCULAR; INTRAVENOUS; SOFT TISSUE PRN
Status: DISCONTINUED | OUTPATIENT
Start: 2023-04-19 | End: 2023-04-19 | Stop reason: SDUPTHER

## 2023-04-19 RX ORDER — FENTANYL CITRATE 50 UG/ML
INJECTION, SOLUTION INTRAMUSCULAR; INTRAVENOUS PRN
Status: DISCONTINUED | OUTPATIENT
Start: 2023-04-19 | End: 2023-04-19 | Stop reason: SDUPTHER

## 2023-04-19 RX ORDER — LIDOCAINE HYDROCHLORIDE 20 MG/ML
INJECTION, SOLUTION EPIDURAL; INFILTRATION; INTRACAUDAL; PERINEURAL PRN
Status: DISCONTINUED | OUTPATIENT
Start: 2023-04-19 | End: 2023-04-19 | Stop reason: SDUPTHER

## 2023-04-19 RX ORDER — ACETAMINOPHEN 120 MG/1
SUPPOSITORY RECTAL PRN
Status: DISCONTINUED | OUTPATIENT
Start: 2023-04-19 | End: 2023-04-19 | Stop reason: ALTCHOICE

## 2023-04-19 RX ORDER — ACETAMINOPHEN 325 MG/1
650 TABLET ORAL EVERY 4 HOURS
Status: DISCONTINUED | OUTPATIENT
Start: 2023-04-19 | End: 2023-04-20 | Stop reason: HOSPADM

## 2023-04-19 RX ORDER — DIPHENHYDRAMINE HCL 25 MG
25 CAPSULE ORAL EVERY 6 HOURS PRN
Status: DISCONTINUED | OUTPATIENT
Start: 2023-04-19 | End: 2023-04-20 | Stop reason: HOSPADM

## 2023-04-19 RX ORDER — BUPIVACAINE HYDROCHLORIDE 2.5 MG/ML
INJECTION, SOLUTION EPIDURAL; INFILTRATION; INTRACAUDAL PRN
Status: DISCONTINUED | OUTPATIENT
Start: 2023-04-19 | End: 2023-04-19 | Stop reason: ALTCHOICE

## 2023-04-19 RX ORDER — ROCURONIUM BROMIDE 10 MG/ML
INJECTION, SOLUTION INTRAVENOUS PRN
Status: DISCONTINUED | OUTPATIENT
Start: 2023-04-19 | End: 2023-04-19 | Stop reason: SDUPTHER

## 2023-04-19 RX ORDER — SODIUM CHLORIDE 0.9 % (FLUSH) 0.9 %
5-40 SYRINGE (ML) INJECTION PRN
Status: DISCONTINUED | OUTPATIENT
Start: 2023-04-19 | End: 2023-04-20 | Stop reason: HOSPADM

## 2023-04-19 RX ORDER — MIDAZOLAM HYDROCHLORIDE 1 MG/ML
INJECTION INTRAMUSCULAR; INTRAVENOUS PRN
Status: DISCONTINUED | OUTPATIENT
Start: 2023-04-19 | End: 2023-04-19 | Stop reason: SDUPTHER

## 2023-04-19 RX ORDER — SCOLOPAMINE TRANSDERMAL SYSTEM 1 MG/1
1 PATCH, EXTENDED RELEASE TRANSDERMAL
Status: DISCONTINUED | OUTPATIENT
Start: 2023-04-19 | End: 2023-04-20 | Stop reason: HOSPADM

## 2023-04-19 RX ORDER — SODIUM CHLORIDE, SODIUM LACTATE, POTASSIUM CHLORIDE, CALCIUM CHLORIDE 600; 310; 30; 20 MG/100ML; MG/100ML; MG/100ML; MG/100ML
INJECTION, SOLUTION INTRAVENOUS CONTINUOUS PRN
Status: DISCONTINUED | OUTPATIENT
Start: 2023-04-19 | End: 2023-04-19 | Stop reason: SDUPTHER

## 2023-04-19 RX ORDER — SODIUM CHLORIDE 0.9 % (FLUSH) 0.9 %
5-40 SYRINGE (ML) INJECTION EVERY 12 HOURS SCHEDULED
Status: DISCONTINUED | OUTPATIENT
Start: 2023-04-19 | End: 2023-04-19 | Stop reason: HOSPADM

## 2023-04-19 RX ORDER — SODIUM CHLORIDE 9 MG/ML
INJECTION, SOLUTION INTRAVENOUS PRN
Status: DISCONTINUED | OUTPATIENT
Start: 2023-04-19 | End: 2023-04-20 | Stop reason: HOSPADM

## 2023-04-19 RX ORDER — SODIUM CHLORIDE 9 MG/ML
INJECTION, SOLUTION INTRAVENOUS PRN
Status: DISCONTINUED | OUTPATIENT
Start: 2023-04-19 | End: 2023-04-19 | Stop reason: HOSPADM

## 2023-04-19 RX ADMIN — SUGAMMADEX 250 MG: 100 INJECTION, SOLUTION INTRAVENOUS at 12:47

## 2023-04-19 RX ADMIN — WATER 3000 MG: 1 INJECTION, SOLUTION INTRAMUSCULAR; INTRAVENOUS; SUBCUTANEOUS at 10:45

## 2023-04-19 RX ADMIN — ENOXAPARIN SODIUM 40 MG: 100 INJECTION SUBCUTANEOUS at 22:21

## 2023-04-19 RX ADMIN — FAMOTIDINE 20 MG: 10 INJECTION, SOLUTION INTRAVENOUS at 10:09

## 2023-04-19 RX ADMIN — MIDAZOLAM HYDROCHLORIDE 2 MG: 2 INJECTION, SOLUTION INTRAMUSCULAR; INTRAVENOUS at 10:35

## 2023-04-19 RX ADMIN — SODIUM CHLORIDE, SODIUM LACTATE, POTASSIUM CHLORIDE, AND CALCIUM CHLORIDE: 600; 310; 30; 20 INJECTION, SOLUTION INTRAVENOUS at 10:27

## 2023-04-19 RX ADMIN — FENTANYL CITRATE 50 MCG: 50 INJECTION, SOLUTION INTRAMUSCULAR; INTRAVENOUS at 11:17

## 2023-04-19 RX ADMIN — DEXMEDETOMIDINE HYDROCHLORIDE 6 MCG: 100 INJECTION, SOLUTION INTRAVENOUS at 10:33

## 2023-04-19 RX ADMIN — SODIUM CHLORIDE, POTASSIUM CHLORIDE, SODIUM LACTATE AND CALCIUM CHLORIDE: 600; 310; 30; 20 INJECTION, SOLUTION INTRAVENOUS at 21:12

## 2023-04-19 RX ADMIN — APREPITANT 40 MG: 40 CAPSULE ORAL at 10:09

## 2023-04-19 RX ADMIN — DEXMEDETOMIDINE HYDROCHLORIDE 6 MCG: 100 INJECTION, SOLUTION INTRAVENOUS at 11:17

## 2023-04-19 RX ADMIN — ONDANSETRON 4 MG: 2 INJECTION INTRAMUSCULAR; INTRAVENOUS at 21:13

## 2023-04-19 RX ADMIN — PANTOPRAZOLE SODIUM 40 MG: 40 INJECTION, POWDER, FOR SOLUTION INTRAVENOUS at 14:46

## 2023-04-19 RX ADMIN — DEXMEDETOMIDINE HYDROCHLORIDE 6 MCG: 100 INJECTION, SOLUTION INTRAVENOUS at 12:20

## 2023-04-19 RX ADMIN — ONDANSETRON 4 MG: 2 INJECTION INTRAMUSCULAR; INTRAVENOUS at 11:17

## 2023-04-19 RX ADMIN — Medication 140 MG: at 10:36

## 2023-04-19 RX ADMIN — DEXAMETHASONE SODIUM PHOSPHATE 4 MG: 4 INJECTION, SOLUTION INTRAMUSCULAR; INTRAVENOUS at 11:17

## 2023-04-19 RX ADMIN — ACETAMINOPHEN 650 MG: 325 TABLET ORAL at 14:46

## 2023-04-19 RX ADMIN — ENOXAPARIN SODIUM 40 MG: 100 INJECTION SUBCUTANEOUS at 10:09

## 2023-04-19 RX ADMIN — ROCURONIUM BROMIDE 10 MG: 10 INJECTION, SOLUTION INTRAVENOUS at 11:38

## 2023-04-19 RX ADMIN — FENTANYL CITRATE 50 MCG: 50 INJECTION, SOLUTION INTRAMUSCULAR; INTRAVENOUS at 10:36

## 2023-04-19 RX ADMIN — SODIUM CHLORIDE, POTASSIUM CHLORIDE, SODIUM LACTATE AND CALCIUM CHLORIDE: 600; 310; 30; 20 INJECTION, SOLUTION INTRAVENOUS at 14:38

## 2023-04-19 RX ADMIN — ACETAMINOPHEN 650 MG: 325 TABLET ORAL at 22:21

## 2023-04-19 RX ADMIN — METOPROLOL TARTRATE 25 MG: 25 TABLET, FILM COATED ORAL at 21:09

## 2023-04-19 RX ADMIN — PROPOFOL 200 MG: 10 INJECTION, EMULSION INTRAVENOUS at 10:36

## 2023-04-19 RX ADMIN — HYDROMORPHONE HYDROCHLORIDE 0.5 MG: 1 INJECTION, SOLUTION INTRAMUSCULAR; INTRAVENOUS; SUBCUTANEOUS at 13:29

## 2023-04-19 RX ADMIN — ONDANSETRON 4 MG: 2 INJECTION INTRAMUSCULAR; INTRAVENOUS at 14:46

## 2023-04-19 RX ADMIN — ACETAMINOPHEN 650 MG: 325 TABLET ORAL at 18:03

## 2023-04-19 RX ADMIN — SODIUM CHLORIDE, PRESERVATIVE FREE 10 ML: 5 INJECTION INTRAVENOUS at 21:13

## 2023-04-19 RX ADMIN — ROCURONIUM BROMIDE 30 MG: 10 INJECTION, SOLUTION INTRAVENOUS at 10:40

## 2023-04-19 RX ADMIN — HYOSCYAMINE SULFATE 125 MCG: 0.12 TABLET ORAL; SUBLINGUAL at 14:46

## 2023-04-19 RX ADMIN — OXYCODONE HYDROCHLORIDE 5 MG: 5 TABLET ORAL at 20:05

## 2023-04-19 RX ADMIN — LIDOCAINE HYDROCHLORIDE 80 MG: 20 INJECTION, SOLUTION EPIDURAL; INFILTRATION; INTRACAUDAL; PERINEURAL at 10:36

## 2023-04-19 RX ADMIN — DEXMEDETOMIDINE HYDROCHLORIDE 4 MCG: 100 INJECTION, SOLUTION INTRAVENOUS at 10:56

## 2023-04-19 RX ADMIN — OXYCODONE HYDROCHLORIDE 5 MG: 5 TABLET ORAL at 14:46

## 2023-04-19 ASSESSMENT — PAIN SCALES - GENERAL
PAINLEVEL_OUTOF10: 8
PAINLEVEL_OUTOF10: 8
PAINLEVEL_OUTOF10: 5
PAINLEVEL_OUTOF10: 4
PAINLEVEL_OUTOF10: 6
PAINLEVEL_OUTOF10: 8

## 2023-04-19 ASSESSMENT — PAIN DESCRIPTION - ORIENTATION
ORIENTATION: ANTERIOR
ORIENTATION: UPPER
ORIENTATION: UPPER
ORIENTATION: ANTERIOR
ORIENTATION: MID

## 2023-04-19 ASSESSMENT — PAIN DESCRIPTION - PAIN TYPE: TYPE: SURGICAL PAIN

## 2023-04-19 ASSESSMENT — PAIN DESCRIPTION - LOCATION
LOCATION: ABDOMEN
LOCATION: ABDOMEN
LOCATION: ABDOMEN;CHEST
LOCATION: ABDOMEN
LOCATION: ABDOMEN

## 2023-04-19 ASSESSMENT — PAIN DESCRIPTION - DESCRIPTORS
DESCRIPTORS: SHARP;JABBING
DESCRIPTORS: SHARP;PRESSURE
DESCRIPTORS: ACHING;SORE
DESCRIPTORS: ACHING;BURNING;STABBING
DESCRIPTORS: ACHING

## 2023-04-19 ASSESSMENT — PAIN DESCRIPTION - FREQUENCY: FREQUENCY: INTERMITTENT

## 2023-04-19 ASSESSMENT — PAIN - FUNCTIONAL ASSESSMENT: PAIN_FUNCTIONAL_ASSESSMENT: 0-10

## 2023-04-19 NOTE — ANESTHESIA POSTPROCEDURE EVALUATION
Department of Anesthesiology  Postprocedure Note    Patient: Michelle Grimm  MRN: [id number]  YOB: 1985  Date of evaluation: 4/19/2023      Procedure Summary     Date: 04/19/23 Room / Location: [facility] MAIN 04 / [facility] MAIN OR    Anesthesia Start: 1027 Anesthesia Stop: 5    Procedure: ROBOTIC ASSISTED LAPAROSCOPIC SLEEVE GASTRECTOMY WITH LIVER WEDGE BIOPSY, and HIATAL HERNIA REPAIR (Abdomen) Diagnosis:       Morbid obesity ([address]      Pure hypercholesterolemia      Prediabetes      BMI 45.0-49.9, adult ([address]      Paraesophageal hernia      NAFLD (nonalcoholic fatty liver disease)      Hepatomegaly      (Morbid obesity ([address] [E66.01])      (Pure hypercholesterolemia [E78.00])      (Prediabetes [R73.03])      (Preop examination [Z01.818])      (BMI 45.0-49.9, adult ([facility] [address] [Q86.32])    Surgeons: Gloria Chadwick MD Responsible Provider: Karla Osullivan MD    Anesthesia Type: General ASA Status: 3          Anesthesia Type: General    Holden Phase I: Holden Score: 9    Holden Phase II:        Anesthesia Post Evaluation    Patient location during evaluation: PACU  Patient participation: complete - patient participated  Level of consciousness: sleepy but conscious  Pain score: 0  Airway patency: patent  Nausea & Vomiting: no nausea and no vomiting  Complications: no  Cardiovascular status: blood pressure returned to baseline  Respiratory status: acceptable  Hydration status: euvolemic

## 2023-04-19 NOTE — ANESTHESIA PRE PROCEDURE
ALKPHOS 58 08/08/2022 03:42 PM    AST 12 08/08/2022 03:42 PM    ALT 19 08/08/2022 03:42 PM       POC Tests:   Recent Labs     04/19/23  0942   POCGLU 93       Coags: No results found for: PROTIME, INR, APTT    HCG (If Applicable):   Lab Results   Component Value Date    PREGTESTUR Negative 04/19/2023        ABGs: No results found for: PHART, PO2ART, BRM3RNI, VSC1FPU, BEART, F8HLOVDX     Type & Screen (If Applicable):  No results found for: LABABO, LABRH    Drug/Infectious Status (If Applicable):  No results found for: HIV, HEPCAB    COVID-19 Screening (If Applicable): No results found for: COVID19        Anesthesia Evaluation  Patient summary reviewed  Airway: Mallampati: II          Dental:          Pulmonary:Negative Pulmonary ROS   (+) sleep apnea:                             Cardiovascular:  Exercise tolerance: good (>4 METS),   (+) hypertension:,                   Neuro/Psych:   Negative Neuro/Psych ROS  (+) headaches: migraine headaches, psychiatric history:            GI/Hepatic/Renal: Neg GI/Hepatic/Renal ROS  (+) morbid obesity          Endo/Other: Negative Endo/Other ROS                    Abdominal:             Vascular: negative vascular ROS. Other Findings:           Anesthesia Plan      general     ASA 3       Induction: intravenous. Anesthetic plan and risks discussed with patient.         Attending anesthesiologist reviewed and agrees with Preprocedure content                Miguelito Lugo MD   4/19/2023

## 2023-04-19 NOTE — PROGRESS NOTES
Pt ambulated in the hallway x 6 with mother, pt tolerated activity well. No signs of distress noted.

## 2023-04-19 NOTE — INTERVAL H&P NOTE
Update History & Physical    The patient's History and Physical of March 24, history and procedure was reviewed with the patient and I examined the patient. There was no change. The surgical site was confirmed by the patient and me. Plan: The risks, benefits, expected outcome, and alternative to the recommended procedure have been discussed with the patient. Patient understands and wants to proceed with the procedure.      Electronically signed by Gloria Chadwick MD on 4/19/2023 at 9:51 AM

## 2023-04-19 NOTE — PERIOP NOTE
TRANSFER - OUT REPORT:    Verbal report given to [facility] SUSAN on Jalyn Feliz  being transferred to [address] [facility] for routine post-op       Report consisted of patient's Situation, Background, Assessment and   Recommendations(SBAR). Information from the following report(s) Nurse Handoff Report, Surgery Report, and MAR was reviewed with the receiving nurse. Frederica Assessment: No data recorded  Lines:   Peripheral IV 04/19/23 Right Antecubital (Active)   Site Assessment Clean, dry & intact 04/19/23 1300   Line Status Infusing 04/19/23 1300   Line Care Connections checked and tightened 04/19/23 1300   Phlebitis Assessment No symptoms 04/19/23 1300   Infiltration Assessment 0 04/19/23 1300   Alcohol Cap Used No 04/19/23 1300   Dressing Status Clean, dry & intact 04/19/23 1300   Dressing Type Transparent 04/19/23 1300       Peripheral IV 04/19/23 Left;Posterior Hand (Active)   Site Assessment Clean, dry & intact 04/19/23 1300   Line Status Infusing 04/19/23 1300   Line Care Connections checked and tightened 04/19/23 1300   Phlebitis Assessment No symptoms 04/19/23 1300   Infiltration Assessment 0 04/19/23 1300   Alcohol Cap Used No 04/19/23 1300   Dressing Status Clean, dry & intact 04/19/23 1300   Dressing Type Transparent 04/19/23 1300        Opportunity for questions and clarification was provided. Patient transported with:  Hospital Transport and personal belongings.

## 2023-04-19 NOTE — OP NOTE
Dermabond for the skin. At the end of the procedure all instrument, needle, and sponge counts were correct. I was present and scrubbed throughout the entirety of the case. The patient awoke from anesthesia and was extubated without complication and sent to PACU in stable condition. Estimated Blood Loss:  minimal    Implants: * No implants in log *            Specimens:   ID Type Source Tests Collected by Time Destination   A : liver biopsy Tissue Liver SURGICAL PATHOLOGY Du Peck RN 4/19/2023 1224    B : portion of stomach Tissue Stomach SURGICAL PATHOLOGY Du Peck RN 4/19/2023 1225    C : hernia sac Tissue Hernia Sac SURGICAL PATHOLOGY Tracy Canela MD 4/19/2023 1227            Drains: None                Complications: None    Counts: Sponge and needle counts were correct times two.     Signed By:  Tracy Canela MD     April 19, 2023

## 2023-04-19 NOTE — BRIEF OP NOTE
Brief Postoperative Note      Patient: Tonio Aguilar  YOB: 1985  MRN: [id number]    Date of Procedure: 4/19/2023    Pre-op diagnosis:     * Morbid obesity ([address] [E66.01]     * Pure hypercholesterolemia [E78.00]     * Prediabetes [R73.03]  BMI 50-59    Post-Op Diagnosis:      * Morbid obesity ([address] [E66.01]     * Pure hypercholesterolemia [E78.00]     * Prediabetes [R73.03]  BMI 50-59  NAFLD  Hepatomegaly  Paraesophageal hernia       Procedure(s):  ROBOTIC ASSISTED LAPAROSCOPIC SLEEVE GASTRECTOMY WITH LIVER WEDGE BIOPSY, and HIATAL HERNIA REPAIR    Surgeon(s):  Elroy Farrell MD    Assistant:  Surgical Assistant: Jordon Sutton    Anesthesia: General    Estimated Blood Loss (mL): Minimal    Complications: None    Specimens:   ID Type Source Tests Collected by Time Destination   A : liver biopsy Tissue Liver SURGICAL PATHOLOGY Dieter Menjivar RN 4/19/2023 1224    B : portion of stomach Tissue Stomach SURGICAL PATHOLOGY Dieter Menjivar RN 4/19/2023 1225    C : hernia sac Tissue Hernia Sac SURGICAL PATHOLOGY Elroy Farrell MD 4/19/2023 1227        Implants:  * No implants in log *      Drains: * No LDAs found *    Findings:   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 This was all reduced and the hernia sac and contents resected.  The paraesophageal hernia was repaired uneventfully  Uneventful r-LSG      Electronically signed by Elroy Farrell MD on 4/19/2023 at 12:41 PM

## 2023-04-20 VITALS
HEART RATE: 55 BPM | RESPIRATION RATE: 18 BRPM | WEIGHT: 272 LBS | DIASTOLIC BLOOD PRESSURE: 89 MMHG | BODY MASS INDEX: 50.05 KG/M2 | TEMPERATURE: 98 F | OXYGEN SATURATION: 97 % | SYSTOLIC BLOOD PRESSURE: 124 MMHG | HEIGHT: 62 IN

## 2023-04-20 LAB
BASOPHILS # BLD: 0 K/UL (ref 0–0.1)
BASOPHILS NFR BLD: 0 % (ref 0–2)
DIFFERENTIAL METHOD BLD: ABNORMAL
EOSINOPHIL # BLD: 0 K/UL (ref 0–0.4)
EOSINOPHIL NFR BLD: 0 % (ref 0–5)
ERYTHROCYTE [DISTWIDTH] IN BLOOD BY AUTOMATED COUNT: 12.7 % (ref 11.6–14.5)
HCT VFR BLD AUTO: 36.3 % (ref 35–45)
HGB BLD-MCNC: 11.8 G/DL (ref 12–16)
IMM GRANULOCYTES # BLD AUTO: 0.1 K/UL (ref 0–0.04)
IMM GRANULOCYTES NFR BLD AUTO: 0 % (ref 0–0.5)
LYMPHOCYTES # BLD: 2.3 K/UL (ref 0.9–3.6)
LYMPHOCYTES NFR BLD: 17 % (ref 21–52)
MCH RBC QN AUTO: 27.9 PG (ref 24–34)
MCHC RBC AUTO-ENTMCNC: 32.5 G/DL (ref 31–37)
MCV RBC AUTO: 85.8 FL (ref 78–100)
MONOCYTES # BLD: 1 K/UL (ref 0.05–1.2)
MONOCYTES NFR BLD: 7 % (ref 3–10)
NEUTS SEG # BLD: 10.6 K/UL (ref 1.8–8)
NEUTS SEG NFR BLD: 76 % (ref 40–73)
NRBC # BLD: 0 K/UL (ref 0–0.01)
NRBC BLD-RTO: 0 PER 100 WBC
PLATELET # BLD AUTO: 339 K/UL (ref 135–420)
PMV BLD AUTO: 9.5 FL (ref 9.2–11.8)
RBC # BLD AUTO: 4.23 M/UL (ref 4.2–5.3)
WBC # BLD AUTO: 14 K/UL (ref 4.6–13.2)

## 2023-04-20 PROCEDURE — 6360000002 HC RX W HCPCS: Performed by: SURGERY

## 2023-04-20 PROCEDURE — 36415 COLL VENOUS BLD VENIPUNCTURE: CPT

## 2023-04-20 PROCEDURE — 2580000003 HC RX 258: Performed by: SURGERY

## 2023-04-20 PROCEDURE — C9113 INJ PANTOPRAZOLE SODIUM, VIA: HCPCS | Performed by: SURGERY

## 2023-04-20 PROCEDURE — A4216 STERILE WATER/SALINE, 10 ML: HCPCS | Performed by: SURGERY

## 2023-04-20 PROCEDURE — 85025 COMPLETE CBC W/AUTO DIFF WBC: CPT

## 2023-04-20 PROCEDURE — 6370000000 HC RX 637 (ALT 250 FOR IP): Performed by: SURGERY

## 2023-04-20 RX ORDER — ONDANSETRON 4 MG/1
4 TABLET, ORALLY DISINTEGRATING ORAL EVERY 8 HOURS PRN
Qty: 20 TABLET | Refills: 0 | Status: SHIPPED | OUTPATIENT
Start: 2023-04-20

## 2023-04-20 RX ORDER — OMEPRAZOLE 20 MG/1
20 CAPSULE, DELAYED RELEASE ORAL
Qty: 180 CAPSULE | Refills: 0 | Status: SHIPPED | OUTPATIENT
Start: 2023-04-20

## 2023-04-20 RX ORDER — OXYCODONE HYDROCHLORIDE 5 MG/1
5 TABLET ORAL EVERY 6 HOURS PRN
Qty: 10 TABLET | Refills: 0 | Status: SHIPPED | OUTPATIENT
Start: 2023-04-20 | End: 2023-04-23

## 2023-04-20 RX ADMIN — ONDANSETRON 4 MG: 2 INJECTION INTRAMUSCULAR; INTRAVENOUS at 08:27

## 2023-04-20 RX ADMIN — ONDANSETRON 4 MG: 2 INJECTION INTRAMUSCULAR; INTRAVENOUS at 02:23

## 2023-04-20 RX ADMIN — ACETAMINOPHEN 650 MG: 325 TABLET ORAL at 06:04

## 2023-04-20 RX ADMIN — ACETAMINOPHEN 650 MG: 325 TABLET ORAL at 02:20

## 2023-04-20 RX ADMIN — PANTOPRAZOLE SODIUM 40 MG: 40 INJECTION, POWDER, FOR SOLUTION INTRAVENOUS at 08:26

## 2023-04-20 RX ADMIN — SODIUM CHLORIDE, POTASSIUM CHLORIDE, SODIUM LACTATE AND CALCIUM CHLORIDE: 600; 310; 30; 20 INJECTION, SOLUTION INTRAVENOUS at 04:04

## 2023-04-20 RX ADMIN — ENOXAPARIN SODIUM 40 MG: 100 INJECTION SUBCUTANEOUS at 08:26

## 2023-04-20 RX ADMIN — SODIUM CHLORIDE, PRESERVATIVE FREE 10 ML: 5 INJECTION INTRAVENOUS at 08:27

## 2023-04-20 RX ADMIN — METOPROLOL TARTRATE 25 MG: 25 TABLET, FILM COATED ORAL at 08:27

## 2023-04-20 ASSESSMENT — PAIN SCALES - GENERAL
PAINLEVEL_OUTOF10: 2
PAINLEVEL_OUTOF10: 4
PAINLEVEL_OUTOF10: 3

## 2023-04-20 ASSESSMENT — PAIN DESCRIPTION - LOCATION: LOCATION: BACK

## 2023-04-20 ASSESSMENT — PAIN DESCRIPTION - DESCRIPTORS: DESCRIPTORS: ACHING

## 2023-04-20 ASSESSMENT — PAIN DESCRIPTION - ORIENTATION: ORIENTATION: LOWER

## 2023-04-20 NOTE — PLAN OF CARE
Problem: Safety - Adult  Goal: Free from fall injury  Outcome: Completed     Problem: Pain  Goal: Verbalizes/displays adequate comfort level or baseline comfort level  Outcome: Completed     Problem: Discharge Planning  Goal: Discharge to home or other facility with appropriate resources  Outcome: Completed

## 2023-04-20 NOTE — CARE COORDINATION
Case Management Assessment  Initial Evaluation    Date/Time of Evaluation: 4/20/2023 8:50 AM  Assessment Completed by: Elsa Mcmillan    If patient is discharged prior to next notation, then this note serves as note for discharge by case management. Patient Name: Daphne Hernandez                   YOB: 1985  Diagnosis: Morbid obesity ([facility] [address] [E66.01]  Pure hypercholesterolemia [E78.00]  Prediabetes [R73.03]  Preop examination [Z01.818]  BMI 45.0-49.9, adult ([facility] [address] [Z68.42]  Morbid obesity due to excess calories ([facility] [address] [E66.01]                   Date / Time: 4/19/2023  8:53 AM    Patient Admission Status: Inpatient   Readmission Risk (Low < 19, Mod (19-27), High > 27): Readmission Risk Score: 5.9    Current PCP: PANDA Ambrocio  PCP verified by CM? (P) Yes    Chart Reviewed: Yes      History Provided by: (P) Patient  Patient Orientation: (P) Alert and Oriented    Patient Cognition: (P) Alert    Hospitalization in the last 30 days (Readmission):  No    If yes, Readmission Assessment in CM Navigator will be completed.     Advance Directives:      Code Status: Full Code   Patient's Primary Decision Maker is:        Discharge Planning:    Patient lives with: Parent Type of Home: House  Primary Care Giver: (P) Self  Patient Support Systems include: Parent   Current Financial resources: (P) Medicaid  Current community resources: (P) None  Current services prior to admission: None            Current DME:              Type of Home Care services:  None    ADLS  Prior functional level: (P) Independent in ADLs/IADLs  Current functional level: (P) Independent in ADLs/IADLs    PT AM-PAC:   /24  OT AM-PAC:   /24    Family can provide assistance at DC: (P) Yes  Would you like Case Management to discuss the discharge plan with any other family members/significant others, and if so, who? (P) No  Plans to Return to Present Housing: (P) Yes  Other Identified Issues/Barriers to RETURNING to current housing:

## 2023-04-20 NOTE — PLAN OF CARE
Problem: Safety - Adult  Goal: Free from fall injury  4/19/2023 2145 by James Hardwick RN  Outcome: Progressing  4/19/2023 1657 by Ras Chase RN  Outcome: Progressing     Problem: Pain  Goal: Verbalizes/displays adequate comfort level or baseline comfort level  4/19/2023 2145 by James Hardwick RN  Outcome: Progressing  4/19/2023 1657 by Ras Chase RN  Outcome: Progressing     Problem: Discharge Planning  Goal: Discharge to home or other facility with appropriate resources  4/19/2023 2145 by James Hardwick RN  Outcome: Progressing  4/19/2023 1657 by Ras Chase RN  Outcome: Progressing

## 2023-04-20 NOTE — PROGRESS NOTES
Discharge teaching completed at bedside with patient. Opportunity provided for clarifying questions All answered to patient satisfaction. IV(2) removed ID removed and shredded.

## 2023-04-20 NOTE — PROGRESS NOTES
Surgery Progress Note    4/20/2023    Admit Date: 4/19/2023    Subjective:     Pt reports upper abd pain, somewhat managed with current plan. Some nausea overnight but improved this morning. Denies vomiting and is tolerating PO well. She has been ambulating in the halls. Objective:     Blood pressure (!) 145/91, pulse 69, temperature 98.1 °F (36.7 °C), temperature source Oral, resp. rate 18, height 5' 1.5\" (1.562 m), weight 272 lb (123.4 kg), SpO2 97 %. No intake/output data recorded. 04/18 1901 - 04/20 0700  In: 3128 [P.O.:520;  I.V.:2608]  Out: 2900 [Urine:2900]    EXAM: GENERAL: alert, pleasant, no distress   HEART: regular rate and rhythm   LUNGS: clear to auscultation   ABDOMEN:  Soft, obese, appropriate incisional tenderness, +BS, non-distended, surgical incisions clean, dry, no erythema or drainage   EXTREMITIES: warm, well perfused    Data Review    Recent Results (from the past 24 hour(s))   POC Pregnancy Urine Qual    Collection Time: 04/19/23  9:42 AM   Result Value Ref Range    Preg Test, Ur Negative NEG     POCT Glucose    Collection Time: 04/19/23  9:42 AM   Result Value Ref Range    POC Glucose 93 70 - 110 mg/dL       Assessment:   Kelvin NURIS Dixon Cea is a 41 y.o. female,  day 1 status post   Laparoscopic paraesophageal hernia repair with robotic assist  Laparoscopic sleeve gastrectomy with robotic assist  Laparoscopic wedge biopsy of the left lobe of the liver   Condition: Good    Plan:   -Ambulate every four hours  -Pain managed prior to d/c   -Nausea managed prior to d/c   -Advance to Clear Liquid Bariatric Diet, if able to tolerate clear liquid diet (with protein shake) 4oz per hour for 3 hours prior to d/c    If patient continues to progress d/c home later today     TED Sahu - NP  8:20 AM  4/20/2023

## 2023-04-20 NOTE — DISCHARGE INSTRUCTIONS
understanding. Discharge medications reviewed with the {Sonia meds reviewed [id number]} and appropriate educational materials and side effects teaching were provided.   ___________________________________________________________________________________________________________________________________

## 2023-04-20 NOTE — PROGRESS NOTES
Patient was educated on all discharge instructions below. He/she understood and was provided a copy. He/she knows who to call for any issues post discharge. Hydration  Hydration is your NUMBER ONE priority. Dehydration is the most common reason for readmission to the hospital. Dehydration occurs when  your body does not get enough fluid to keep it functioning at its best. Your body also requires fluid  to burn its stored fat calories for energy. Carry a bottle of water with you all day, even when you are away from home; remind yourself to  drink even if you dont feel thirsty. Drinking 64 ounces of fluid is your daily goal. You can tell if  youre getting enough fluid if youre making clear, light-colored urine five to 10 times per day. Signs of dehydration can be thirst, headache, hard stools or dizziness upon sitting or standing up. You should contact your surgeons office if you are unable to drink enough fluid to stay hydrated. --   [address] [facility] after Surgery   No lifting over 15 pounds for four weeks. No driving while taking the pain medication (about seven to 10 days). No tub baths, swimming or hot tubs until incisions are healed (about two weeks). You may shower. Clean incisions daily /gently with soap and check incisions for signs of infection:  -- Redness around incision. -- Swelling at site. -- Drainage with an foul odor (pus). -- Increase tenderness around incision. Take your temperature and resting pulse in the morning and evening. Record on tracking form  given to you. Call if your temperature is greater than 101 or your pulse rate is greater than 115. Please contact your surgeon if you are having excessive abdominal pain (that lasts longer than  four hours and does not improve with prescribed pain medication), vomiting or shortness of breath. Get up and move -- do not sit in one place for more than an hour.    You need to WALK (EXERCISE) for 30

## 2023-04-24 ENCOUNTER — FOLLOWUP TELEPHONE ENCOUNTER (OUTPATIENT)
Facility: HOSPITAL | Age: 38
End: 2023-04-24

## 2023-04-24 NOTE — TELEPHONE ENCOUNTER
Pt is getting in 48 oz a day. Pt instructed that she needs to get in 64 oz of fluid. Pt verbalized understanding. Pt up ambulating.

## 2023-04-27 DIAGNOSIS — F33.9 RECURRENT DEPRESSION (HCC): ICD-10-CM

## 2023-05-05 ENCOUNTER — OFFICE VISIT (OUTPATIENT)
Age: 38
End: 2023-05-05

## 2023-05-05 VITALS
HEIGHT: 62 IN | BODY MASS INDEX: 46.38 KG/M2 | SYSTOLIC BLOOD PRESSURE: 126 MMHG | TEMPERATURE: 97.8 F | OXYGEN SATURATION: 92 % | WEIGHT: 252 LBS | DIASTOLIC BLOOD PRESSURE: 83 MMHG | RESPIRATION RATE: 16 BRPM | HEART RATE: 79 BPM

## 2023-05-05 DIAGNOSIS — E78.00 PURE HYPERCHOLESTEROLEMIA: ICD-10-CM

## 2023-05-05 DIAGNOSIS — Z98.84 BARIATRIC SURGERY STATUS: ICD-10-CM

## 2023-05-05 DIAGNOSIS — Z13.21 MALNUTRITION SCREEN: ICD-10-CM

## 2023-05-05 DIAGNOSIS — R73.03 PREDIABETES: ICD-10-CM

## 2023-05-05 DIAGNOSIS — K91.2 POSTSURGICAL MALABSORPTION: ICD-10-CM

## 2023-05-05 DIAGNOSIS — E66.01 OBESITY, MORBID (HCC): Primary | ICD-10-CM

## 2023-05-05 DIAGNOSIS — E66.01 OBESITY, MORBID (HCC): ICD-10-CM

## 2023-05-05 RX ORDER — IBUPROFEN 200 MG
1 CAPSULE ORAL 3 TIMES DAILY
COMMUNITY

## 2023-05-05 RX ORDER — URSODIOL 200 MG/1
200 CAPSULE ORAL SEE ADMIN INSTRUCTIONS
Qty: 90 CAPSULE | Refills: 5 | Status: SHIPPED | OUTPATIENT
Start: 2023-05-05

## 2023-05-05 RX ORDER — ONDANSETRON 4 MG/1
4 TABLET, ORALLY DISINTEGRATING ORAL EVERY 8 HOURS PRN
Qty: 20 TABLET | Refills: 0 | Status: SHIPPED | OUTPATIENT
Start: 2023-05-05

## 2023-05-05 NOTE — PROGRESS NOTES
Patricia Rincon is a 41 y.o. female (: 1985)     Chief Complaint   Patient presents with    Post-Op Check     Sleeve 23       Medication list and allergies have been reviewed with Kelvin Flores and updated as of today's date. I have gone over all Medical, Surgical and Social History with Kelvin Flores and updated/added the information accordingly. 1. Have you been to the ER, urgent care clinic since your last visit? Hospitalized since your last visit? No    2. Have you seen or consulted any other health care providers outside of the [address] [place] [address] since your last visit? Include any pap smears or colon screening. No    The patient states that is doing 50 grams of protein, more than 64 oz of fluids and is exercising 30 minutes at least 4 days a week.

## 2023-05-05 NOTE — PROGRESS NOTES
Bariatric Surgery Post Op Progress Note    CC: follow up r-PEHR with LSG and liver biopsy  Subjective:     Isis Griffin  is a 41 y.o. female who presents for follow-up after above procedure. . She has lost a total of 19 pounds since surgery. Body mass index is 46.84 kg/m². .     Path   FINAL DIAGNOSIS:     A: LIVER, WEDGE BIOPSY:        MILD STEATOSIS, WITH NO EVIDENCE OF STEATOHEPATITIS, FIBROSIS OR   CIRRHOSIS. B: STOMACH, PARTIAL RESECTION:        SEGMENT OF BENIGN STOMACH WITH OCCASIONAL SMALL FOCUS OF INTESTINAL   METAPLASIA. C: HERNIA, SITE NOT SPECIFIED:        CONSISTENT WITH HERNIA. Denies f/c/cp/sob/peripheral swelling    50+ g protein per day  64+ oz of fluids per day    Nausea: Yes, first thing in AM  Vomiting: No  Dysphagia: No  Reflux: No  Exercise: Yes  MVI: Yes, Bariatric Advantage,   Calcium citrate: Yes, TID    4 tablets of opiate medication taken postop. Changes in their medical history and medications have been reviewed.     Comorbidities:   JESSICA, HTN, HLD, prediabetes    Patient Active Problem List   Diagnosis    Obesity, morbid (HCC)    Recurrent depression ([facility] [address]    Chronic allergic rhinitis    Chronic tension-type headache, not intractable    Endometriosis    IUD (intrauterine device) in place    Elevated BP without diagnosis of hypertension    Pure hypercholesterolemia    Prediabetes    Dyslipidemia, goal LDL below 100    Episodic tension-type headache, not intractable    Morbid obesity due to excess calories [facility])     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    Autoimmune disease ([facility] [address] 2014/2015    Endometriosis    Chronic pain October 2021    In left hip due to osteoarthritis    Depression Since early 20's    Deviated septum 01/01/2012    HTN (hypertension)     Multiple nasal polyps 01/01/2012    removed     JESSICA on CPAP      Past Surgical History:   Procedure Laterality Date    SLEEVE GASTRECTOMY N/A

## 2023-05-27 DIAGNOSIS — R73.03 PREDIABETES: ICD-10-CM

## 2023-05-27 DIAGNOSIS — E78.5 HYPERLIPIDEMIA, UNSPECIFIED HYPERLIPIDEMIA TYPE: ICD-10-CM

## 2023-05-27 DIAGNOSIS — E66.01 OBESITY, MORBID (HCC): Primary | ICD-10-CM

## 2023-05-27 RX ORDER — FLUTICASONE PROPIONATE 50 MCG
SPRAY, SUSPENSION (ML) NASAL
Qty: 16 G | Refills: 2 | OUTPATIENT
Start: 2023-05-27

## 2023-05-27 RX ORDER — SUMATRIPTAN 50 MG/1
TABLET, FILM COATED ORAL
Qty: 9 TABLET | Refills: 0 | Status: SHIPPED | OUTPATIENT
Start: 2023-05-27

## 2023-05-31 ENCOUNTER — HOSPITAL ENCOUNTER (OUTPATIENT)
Facility: HOSPITAL | Age: 38
Discharge: HOME | End: 2023-06-03

## 2023-05-31 ENCOUNTER — CLINICAL DOCUMENTATION (OUTPATIENT)
Facility: HOSPITAL | Age: 38
End: 2023-05-31

## 2023-05-31 NOTE — PROGRESS NOTES
[facility]  POST-OP NUTRITION FOLLOW UP    Patient's Name: Anthony Long  YOB: 1985  Surgery Date: 2023      Procedure: Sleeve    Surgeon: Dr. Yumiko Quick    Height: 5' 1.5\"     Pre-Op Weight: 272     Current Weight: 241  Weight Lost: 31      Attendance of support group: yes      Complications  Readmittance: no  Reoperations: no  Complications: no  IV Fluids: no  ER Trips: no    Problem Areas:   Nausea: yes   Vomiting: yes   Dumping Syndrome: a little bit, sweet tea  Inadequate Protein: no  Inadequate Fluids: no  Food Intolerance: no and yes  Hunger: no  Constipation: no    Eating 3 Meals/Day: yes  Portion Size at Meals: 1-2     Protein from Food: 30    Foods being consumed:  7:15  Breakfast: Time:7:20  protein drink, vitamins  Lunch: Time: 11:30   eggs, tiny tiny corner of waffle  Dinner:  Time: 6:00   protein shake  In-between eating: protein shake    Length of time for meals: 30    food/fluids: yes    Fluids: >70 oz/day   Types of Fluids: water, protein shake, unsweat tea, protein drink    MVI: Baratric Advantage    Number/Day: 1    Calcium: calcium citrate    Calcium Dosin    Taken Separately: yes     Protein Supplement: Premier     Grams of Protein: 30   Patient is taking 7 days a week. Exercise: swimming in the pool, exercise bike, row machine, walking      Comments:    Patient is doing very well. Patient was educated on the importance of eating meat and vegetables only. I have talked with patient about the effects of carbohydrates, not only from a weight management perspective, but also what effects it could have on their blood sugar and what reactive hypoglycemia is. Diet Follow Up:  9 month class scheduled.   Uli Weber, RD    2023

## 2023-06-22 ENCOUNTER — OFFICE VISIT (OUTPATIENT)
Facility: CLINIC | Age: 38
End: 2023-06-22
Payer: MEDICAID

## 2023-06-22 VITALS
RESPIRATION RATE: 16 BRPM | DIASTOLIC BLOOD PRESSURE: 91 MMHG | TEMPERATURE: 96.8 F | BODY MASS INDEX: 43.21 KG/M2 | HEIGHT: 62 IN | SYSTOLIC BLOOD PRESSURE: 126 MMHG | OXYGEN SATURATION: 100 % | WEIGHT: 234.8 LBS | HEART RATE: 64 BPM

## 2023-06-22 DIAGNOSIS — R73.03 PREDIABETES: Primary | ICD-10-CM

## 2023-06-22 DIAGNOSIS — E78.00 PURE HYPERCHOLESTEROLEMIA: ICD-10-CM

## 2023-06-22 DIAGNOSIS — J01.90 ACUTE BACTERIAL SINUSITIS: ICD-10-CM

## 2023-06-22 DIAGNOSIS — B96.89 ACUTE BACTERIAL SINUSITIS: ICD-10-CM

## 2023-06-22 PROCEDURE — 99213 OFFICE O/P EST LOW 20 MIN: CPT

## 2023-06-22 RX ORDER — ATORVASTATIN CALCIUM 20 MG/1
20 TABLET, FILM COATED ORAL DAILY
COMMUNITY
Start: 2023-02-06 | End: 2023-06-22 | Stop reason: SINTOL

## 2023-06-22 RX ORDER — ALBUTEROL SULFATE 90 UG/1
AEROSOL, METERED RESPIRATORY (INHALATION)
COMMUNITY
Start: 2023-05-24

## 2023-06-22 RX ORDER — DOXYCYCLINE HYCLATE 100 MG
100 TABLET ORAL 2 TIMES DAILY
Qty: 10 TABLET | Refills: 0 | Status: SHIPPED | OUTPATIENT
Start: 2023-06-22 | End: 2023-06-27

## 2023-06-22 SDOH — ECONOMIC STABILITY: INCOME INSECURITY: HOW HARD IS IT FOR YOU TO PAY FOR THE VERY BASICS LIKE FOOD, HOUSING, MEDICAL CARE, AND HEATING?: NOT VERY HARD

## 2023-06-22 SDOH — ECONOMIC STABILITY: FOOD INSECURITY: WITHIN THE PAST 12 MONTHS, YOU WORRIED THAT YOUR FOOD WOULD RUN OUT BEFORE YOU GOT MONEY TO BUY MORE.: NEVER TRUE

## 2023-06-22 SDOH — ECONOMIC STABILITY: HOUSING INSECURITY
IN THE LAST 12 MONTHS, WAS THERE A TIME WHEN YOU DID NOT HAVE A STEADY PLACE TO SLEEP OR SLEPT IN A SHELTER (INCLUDING NOW)?: NO

## 2023-06-22 SDOH — ECONOMIC STABILITY: FOOD INSECURITY: WITHIN THE PAST 12 MONTHS, THE FOOD YOU BOUGHT JUST DIDN'T LAST AND YOU DIDN'T HAVE MONEY TO GET MORE.: NEVER TRUE

## 2023-06-22 SDOH — ECONOMIC STABILITY: INCOME INSECURITY: HOW HARD IS IT FOR YOU TO PAY FOR THE VERY BASICS LIKE FOOD, HOUSING, MEDICAL CARE, AND HEATING?: NOT HARD AT ALL

## 2023-06-22 ASSESSMENT — PATIENT HEALTH QUESTIONNAIRE - PHQ9
SUM OF ALL RESPONSES TO PHQ QUESTIONS 1-9: 0
SUM OF ALL RESPONSES TO PHQ QUESTIONS 1-9: 0
SUM OF ALL RESPONSES TO PHQ9 QUESTIONS 1 & 2: 0
SUM OF ALL RESPONSES TO PHQ QUESTIONS 1-9: 0
SUM OF ALL RESPONSES TO PHQ QUESTIONS 1-9: 0
2. FEELING DOWN, DEPRESSED OR HOPELESS: 0
1. LITTLE INTEREST OR PLEASURE IN DOING THINGS: 0

## 2023-06-26 DIAGNOSIS — F33.9 RECURRENT DEPRESSION (HCC): ICD-10-CM

## 2023-07-11 DIAGNOSIS — B96.89 ACUTE BACTERIAL SINUSITIS: ICD-10-CM

## 2023-07-11 DIAGNOSIS — J01.90 ACUTE BACTERIAL SINUSITIS: ICD-10-CM

## 2023-07-12 NOTE — TELEPHONE ENCOUNTER
Medication(s) requesting:   Requested Prescriptions     Pending Prescriptions Disp Refills    albuterol sulfate HFA (PROVENTIL;VENTOLIN;PROAIR) 108 (90 Base) MCG/ACT inhaler [Pharmacy Med Name: ALBUTEROL HFA INH(200 PUFFS) 18GM] 18 g      Sig: INHALE 1 PUFF BY MOUTH EVERY 6 HOURS AS NEEDED FOR WHEEZING       Last office visit:  06/22/2023  Next office visit DMA: 10/2/2023  FUTURE APPT:   Future Appointments   Date Time Provider [address] [place] [address]   8/16/2023  3:30 PM TED Ewing - NP [facility] BS AMB   10/2/2023  3:15 PM PANDA Piedra DMA BS AMB   12/15/2023 10:00 AM  [place] DIETITIAN [address]   1/4/2024 10:00 AM Pipo Heart MD [facility] BS AMB

## 2023-07-13 RX ORDER — ALBUTEROL SULFATE 90 UG/1
AEROSOL, METERED RESPIRATORY (INHALATION)
Qty: 18 G | Refills: 5 | Status: SHIPPED | OUTPATIENT
Start: 2023-07-13

## 2023-07-26 NOTE — TELEPHONE ENCOUNTER
Medication(s) requesting:   Requested Prescriptions     Pending Prescriptions Disp Refills    metoprolol tartrate (LOPRESSOR) 25 MG tablet [Pharmacy Med Name: METOPROLOL TARTRATE 25MG  TABLETS] 180 tablet      Sig: TAKE 1 TABLET BY MOUTH TWICE DAILY FOR HIGH BLOOD PRESSURE       Last office visit:  06/22/2023  Next office visit DMA: 10/2/2023  FUTURE APPT:   Future Appointments   Date Time Provider [address]   8/16/2023  3:30 PM TED Singletary NP [facility] BS AMB   10/2/2023  3:15 PM PANDA Mccray DMA BS AMB   12/15/2023 10:00 AM  [place] DIETITIAN [address] [place] [facility]   1/4/2024 10:00 AM Pipo Werner MD [facility] BS AMB

## 2023-08-16 ENCOUNTER — OFFICE VISIT (OUTPATIENT)
Age: 38
End: 2023-08-16
Payer: MEDICAID

## 2023-08-16 VITALS
TEMPERATURE: 98 F | BODY MASS INDEX: 40.67 KG/M2 | OXYGEN SATURATION: 98 % | HEART RATE: 57 BPM | WEIGHT: 221 LBS | SYSTOLIC BLOOD PRESSURE: 136 MMHG | HEIGHT: 62 IN | DIASTOLIC BLOOD PRESSURE: 80 MMHG

## 2023-08-16 DIAGNOSIS — Z98.84 GENERALIZED EXCESS AND REDUNDANT SKIN AFTER BARIATRIC SURGERY: ICD-10-CM

## 2023-08-16 DIAGNOSIS — K91.2 POSTGASTRECTOMY MALABSORPTION: ICD-10-CM

## 2023-08-16 DIAGNOSIS — Z90.3 POSTGASTRECTOMY MALABSORPTION: ICD-10-CM

## 2023-08-16 DIAGNOSIS — K91.2 POSTGASTRECTOMY MALABSORPTION: Primary | ICD-10-CM

## 2023-08-16 DIAGNOSIS — L30.4 CHAFING: ICD-10-CM

## 2023-08-16 DIAGNOSIS — Z90.3 S/P GASTRIC SLEEVE PROCEDURE: ICD-10-CM

## 2023-08-16 DIAGNOSIS — L98.7 GENERALIZED EXCESS AND REDUNDANT SKIN AFTER BARIATRIC SURGERY: ICD-10-CM

## 2023-08-16 DIAGNOSIS — E66.01 MORBID OBESITY (HCC): ICD-10-CM

## 2023-08-16 DIAGNOSIS — Z90.3 POSTGASTRECTOMY MALABSORPTION: Primary | ICD-10-CM

## 2023-08-16 PROCEDURE — 99214 OFFICE O/P EST MOD 30 MIN: CPT | Performed by: REGISTERED NURSE

## 2023-08-16 RX ORDER — NYSTATIN 100000 [USP'U]/G
POWDER TOPICAL
Qty: 30 G | Refills: 2 | Status: SHIPPED | OUTPATIENT
Start: 2023-08-16

## 2023-08-16 ASSESSMENT — ENCOUNTER SYMPTOMS
GASTROINTESTINAL NEGATIVE: 1
SHORTNESS OF BREATH: 0
CHEST TIGHTNESS: 0

## 2023-08-18 LAB
25(OH)D3+25(OH)D2 SERPL-MCNC: 35.4 NG/ML (ref 30–100)
ALBUMIN SERPL-MCNC: 3.8 G/DL (ref 3.9–4.9)
ALBUMIN/GLOB SERPL: 1.4 {RATIO} (ref 1.2–2.2)
ALP SERPL-CCNC: 68 IU/L (ref 44–121)
ALT SERPL-CCNC: 11 IU/L (ref 0–32)
AST SERPL-CCNC: 15 IU/L (ref 0–40)
BASOPHILS # BLD AUTO: 0 X10E3/UL (ref 0–0.2)
BASOPHILS NFR BLD AUTO: 0 %
BILIRUB SERPL-MCNC: 0.4 MG/DL (ref 0–1.2)
BUN SERPL-MCNC: 6 MG/DL (ref 6–20)
BUN/CREAT SERPL: 8 (ref 9–23)
CALCIUM SERPL-MCNC: 9.3 MG/DL (ref 8.7–10.2)
CHLORIDE SERPL-SCNC: 104 MMOL/L (ref 96–106)
CHOLEST SERPL-MCNC: 131 MG/DL (ref 100–199)
CO2 SERPL-SCNC: 23 MMOL/L (ref 20–29)
CREAT SERPL-MCNC: 0.75 MG/DL (ref 0.57–1)
EGFRCR SERPLBLD CKD-EPI 2021: 106 ML/MIN/1.73
EOSINOPHIL # BLD AUTO: 0.1 X10E3/UL (ref 0–0.4)
EOSINOPHIL NFR BLD AUTO: 1 %
ERYTHROCYTE [DISTWIDTH] IN BLOOD BY AUTOMATED COUNT: 12.7 % (ref 11.7–15.4)
FERRITIN SERPL-MCNC: 107 NG/ML (ref 15–150)
GLOBULIN SER CALC-MCNC: 2.8 G/DL (ref 1.5–4.5)
GLUCOSE SERPL-MCNC: 84 MG/DL (ref 70–99)
HBA1C MFR BLD: 5.8 % (ref 4.8–5.6)
HCT VFR BLD AUTO: 34.9 % (ref 34–46.6)
HDLC SERPL-MCNC: 41 MG/DL
HGB BLD-MCNC: 11.4 G/DL (ref 11.1–15.9)
IMM GRANULOCYTES # BLD AUTO: 0 X10E3/UL (ref 0–0.1)
IMM GRANULOCYTES NFR BLD AUTO: 0 %
IRON SERPL-MCNC: 49 UG/DL (ref 27–159)
LDLC SERPL CALC-MCNC: 76 MG/DL (ref 0–99)
LYMPHOCYTES # BLD AUTO: 3.6 X10E3/UL (ref 0.7–3.1)
LYMPHOCYTES NFR BLD AUTO: 54 %
MCH RBC QN AUTO: 28.7 PG (ref 26.6–33)
MCHC RBC AUTO-ENTMCNC: 32.7 G/DL (ref 31.5–35.7)
MCV RBC AUTO: 88 FL (ref 79–97)
MONOCYTES # BLD AUTO: 0.5 X10E3/UL (ref 0.1–0.9)
MONOCYTES NFR BLD AUTO: 8 %
NEUTROPHILS # BLD AUTO: 2.5 X10E3/UL (ref 1.4–7)
NEUTROPHILS NFR BLD AUTO: 37 %
PLATELET # BLD AUTO: 284 X10E3/UL (ref 150–450)
POTASSIUM SERPL-SCNC: 3.9 MMOL/L (ref 3.5–5.2)
PROT SERPL-MCNC: 6.6 G/DL (ref 6–8.5)
RBC # BLD AUTO: 3.97 X10E6/UL (ref 3.77–5.28)
SODIUM SERPL-SCNC: 141 MMOL/L (ref 134–144)
SPECIMEN STATUS REPORT: NORMAL
TRIGL SERPL-MCNC: 65 MG/DL (ref 0–149)
VIT B12 SERPL-MCNC: 637 PG/ML (ref 232–1245)
VLDLC SERPL CALC-MCNC: 14 MG/DL (ref 5–40)
WBC # BLD AUTO: 6.7 X10E3/UL (ref 3.4–10.8)

## 2023-08-21 LAB — VIT B1 BLD-SCNC: 90.1 NMOL/L (ref 66.5–200)

## 2023-08-22 ENCOUNTER — TELEPHONE (OUTPATIENT)
Age: 38
End: 2023-08-22

## 2023-08-22 NOTE — TELEPHONE ENCOUNTER
----- Message from Ijeoma Balbuena sent at 8/22/2023  2:50 PM EDT -----  Regarding: FW: Question regarding HB  Contact: [phone]    ----- Message -----  From: Darrin Riggs RN  Sent: 8/22/2023   2:43 PM EDT  To: Michela Long MA  Subject: FW: Question regarding HB                          ----- Message -----  From: Kelly Torrez  Sent: 8/22/2023   1:44 PM EDT  To: , #  Subject: Question regarding HB                            Are my results looking good? Will I be able to discontinue any medications?

## 2023-08-25 DIAGNOSIS — F33.9 RECURRENT DEPRESSION (HCC): ICD-10-CM

## 2023-08-25 NOTE — TELEPHONE ENCOUNTER
Medication(s) requesting:   Requested Prescriptions     Pending Prescriptions Disp Refills    sertraline (ZOLOFT) 50 MG tablet [Pharmacy Med Name: SERTRALINE 50MG TABLETS] 30 tablet 1     Sig: TAKE 1 TABLET BY MOUTH DAILY       Last office visit:  06/22/2023  Next office visit DMA: 10/2/2023  FUTURE APPT:   Future Appointments   Date Time Provider [address]   10/2/2023  3:15 PM PANDA Mtz [facility] BS AMB   11/17/2023 11:30 AM TED Garcia NP [facility] BS AMB   12/15/2023 10:00 AM  [place] DIETITIAN KARYN CALERO [facility]   1/4/2024 10:00 AM Pipo Paz MD [facility] BS AMB

## 2023-08-26 DIAGNOSIS — F33.9 RECURRENT DEPRESSION (HCC): ICD-10-CM

## 2023-08-28 NOTE — TELEPHONE ENCOUNTER
Medication(s) requesting:   Requested Prescriptions     Pending Prescriptions Disp Refills    sertraline (ZOLOFT) 50 MG tablet [Pharmacy Med Name: SERTRALINE 50MG TABLETS] 30 tablet 1     Sig: TAKE 1 TABLET BY MOUTH DAILY       Last office visit:  06/22/2023  Next office visit DMA: 10/2/2023  FUTURE APPT:   Future Appointments   Date Time Provider [address]   10/2/2023  3:15 PM PANDA Barker DMA BS AMB   11/17/2023 11:30 AM TED Kerr NP [facility] BS AMB   12/15/2023 10:00 AM  [place] DIETITIAN KARYN [facility]   1/4/2024 10:00 AM Pipo Peraza MD [facility] BS AMB

## 2023-10-02 ENCOUNTER — OFFICE VISIT (OUTPATIENT)
Facility: CLINIC | Age: 38
End: 2023-10-02
Payer: MEDICAID

## 2023-10-02 VITALS
RESPIRATION RATE: 17 BRPM | OXYGEN SATURATION: 95 % | SYSTOLIC BLOOD PRESSURE: 126 MMHG | HEART RATE: 56 BPM | BODY MASS INDEX: 39.56 KG/M2 | HEIGHT: 62 IN | WEIGHT: 215 LBS | DIASTOLIC BLOOD PRESSURE: 81 MMHG | TEMPERATURE: 97.9 F

## 2023-10-02 DIAGNOSIS — Z90.3 S/P GASTRIC SLEEVE PROCEDURE: ICD-10-CM

## 2023-10-02 DIAGNOSIS — F32.A ANXIETY AND DEPRESSION: Primary | ICD-10-CM

## 2023-10-02 DIAGNOSIS — F41.9 ANXIETY AND DEPRESSION: Primary | ICD-10-CM

## 2023-10-02 PROCEDURE — 99212 OFFICE O/P EST SF 10 MIN: CPT

## 2023-10-02 RX ORDER — QUETIAPINE FUMARATE 25 MG/1
50 TABLET, FILM COATED ORAL NIGHTLY
Qty: 60 TABLET | Refills: 3 | Status: SHIPPED | OUTPATIENT
Start: 2023-10-02 | End: 2023-10-02

## 2023-10-02 RX ORDER — QUETIAPINE FUMARATE 50 MG/1
50 TABLET, FILM COATED ORAL NIGHTLY
Qty: 90 TABLET | Refills: 1 | Status: SHIPPED | OUTPATIENT
Start: 2023-10-02 | End: 2023-10-02 | Stop reason: ALTCHOICE

## 2023-10-02 SDOH — ECONOMIC STABILITY: FOOD INSECURITY: WITHIN THE PAST 12 MONTHS, THE FOOD YOU BOUGHT JUST DIDN'T LAST AND YOU DIDN'T HAVE MONEY TO GET MORE.: NEVER TRUE

## 2023-10-02 SDOH — ECONOMIC STABILITY: INCOME INSECURITY: HOW HARD IS IT FOR YOU TO PAY FOR THE VERY BASICS LIKE FOOD, HOUSING, MEDICAL CARE, AND HEATING?: NOT HARD AT ALL

## 2023-10-02 SDOH — ECONOMIC STABILITY: FOOD INSECURITY: WITHIN THE PAST 12 MONTHS, YOU WORRIED THAT YOUR FOOD WOULD RUN OUT BEFORE YOU GOT MONEY TO BUY MORE.: NEVER TRUE

## 2023-10-02 ASSESSMENT — ANXIETY QUESTIONNAIRES
IF YOU CHECKED OFF ANY PROBLEMS ON THIS QUESTIONNAIRE, HOW DIFFICULT HAVE THESE PROBLEMS MADE IT FOR YOU TO DO YOUR WORK, TAKE CARE OF THINGS AT HOME, OR GET ALONG WITH OTHER PEOPLE: NOT DIFFICULT AT ALL
3. WORRYING TOO MUCH ABOUT DIFFERENT THINGS: 1
5. BEING SO RESTLESS THAT IT IS HARD TO SIT STILL: 0
1. FEELING NERVOUS, ANXIOUS, OR ON EDGE: 0
7. FEELING AFRAID AS IF SOMETHING AWFUL MIGHT HAPPEN: 0
6. BECOMING EASILY ANNOYED OR IRRITABLE: 0
GAD7 TOTAL SCORE: 3
4. TROUBLE RELAXING: 1
2. NOT BEING ABLE TO STOP OR CONTROL WORRYING: 1

## 2023-10-02 ASSESSMENT — PATIENT HEALTH QUESTIONNAIRE - PHQ9
SUM OF ALL RESPONSES TO PHQ9 QUESTIONS 1 & 2: 1
4. FEELING TIRED OR HAVING LITTLE ENERGY: 1
1. LITTLE INTEREST OR PLEASURE IN DOING THINGS: 0
SUM OF ALL RESPONSES TO PHQ QUESTIONS 1-9: 6
3. TROUBLE FALLING OR STAYING ASLEEP: 1
9. THOUGHTS THAT YOU WOULD BE BETTER OFF DEAD, OR OF HURTING YOURSELF: 1
7. TROUBLE CONCENTRATING ON THINGS, SUCH AS READING THE NEWSPAPER OR WATCHING TELEVISION: 0
5. POOR APPETITE OR OVEREATING: 2
SUM OF ALL RESPONSES TO PHQ QUESTIONS 1-9: 7
2. FEELING DOWN, DEPRESSED OR HOPELESS: 1
SUM OF ALL RESPONSES TO PHQ QUESTIONS 1-9: 7
6. FEELING BAD ABOUT YOURSELF - OR THAT YOU ARE A FAILURE OR HAVE LET YOURSELF OR YOUR FAMILY DOWN: 1
10. IF YOU CHECKED OFF ANY PROBLEMS, HOW DIFFICULT HAVE THESE PROBLEMS MADE IT FOR YOU TO DO YOUR WORK, TAKE CARE OF THINGS AT HOME, OR GET ALONG WITH OTHER PEOPLE: 0
8. MOVING OR SPEAKING SO SLOWLY THAT OTHER PEOPLE COULD HAVE NOTICED. OR THE OPPOSITE, BEING SO FIGETY OR RESTLESS THAT YOU HAVE BEEN MOVING AROUND A LOT MORE THAN USUAL: 0
SUM OF ALL RESPONSES TO PHQ QUESTIONS 1-9: 7

## 2023-10-02 NOTE — PROGRESS NOTES
STUDENT NOTE    Thank you for permitting me to further my medical education by allowing me to participate in your care today! Angi Estevez has provided verbal consent for this student to participate in their care on 10/02/2023       THE FOLLOWING NOTE HAS BEEN CREATED BY A STUDENT FOR THE INTENDED PURPOSE OF EDUCATION ONLY. IT IS NOT SUITABLE FOR MEDICAL ADVICE, DIAGNOSIS, OR TREATMENT. Patient ID: Angi Estevez is a 41 y.o. female established patient presents for the following:      Subjective:     HPI: Pt presents for routine f/u from appt 6 months ago. Pt reports taking medications as prescribed. Denies any concerns today.          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    Autoimmune disease ([address]) 2014/2015    Endometriosis    Chronic pain October 2021    In left hip due to osteoarthritis    Depression Since early 20's    Deviated septum 01/01/2012    HTN (hypertension)     Multiple nasal polyps 01/01/2012    removed     JESSICA on CPAP        Past Surgical History:   Procedure Laterality Date    SLEEVE GASTRECTOMY N/A 4/19/2023    ROBOTIC ASSISTED LAPAROSCOPIC SLEEVE GASTRECTOMY WITH LIVER WEDGE BIOPSY, and HIATAL HERNIA REPAIR performed by Liya Kowalski MD at [address] [place] [address]  2008    WISDOM TOOTH EXTRACTION  2010       Current Outpatient Medications   Medication Sig Dispense Refill    sertraline (ZOLOFT) 50 MG tablet TAKE 1 TABLET BY MOUTH DAILY 90 tablet 1    BIOTIN PO Take by mouth      nystatin (MYCOSTATIN) 361045 UNIT/GM powder Apply to affected area four times daily as needed for itching 30 g 2    metoprolol tartrate (LOPRESSOR) 25 MG tablet TAKE 1 TABLET BY MOUTH TWICE DAILY FOR HIGH BLOOD PRESSURE 180 tablet 3    calcium citrate (CALCITRATE) 950 (200 Ca) MG tablet Take 1 tablet by mouth 3 times daily      Multiple Vitamins-Minerals (BARIATRIC MULTIVITAMINS/IRON PO) Take by mouth      Ursodiol (RELTONE)

## 2023-10-02 NOTE — PROGRESS NOTES
Marie Trejo is a 41y.o. year old female who presents in office today for   Chief Complaint   Patient presents with    Follow-up Chronic Condition       Is someone accompanying this pt? Just daughter    Is the patient using any DME equipment during [address]? No     Depression Screenin/22/2023     1:53 PM 2023     1:45 PM 2023    11:14 AM 1/3/2023    10:10 AM 1/3/2023    10:03 AM 2022     2:04 PM   PHQ-9 Questionaire   Little interest or pleasure in doing things 0 0 0 0 0 0   Feeling down, depressed, or hopeless 0 0 0 0 0 0   PHQ-9 Total Score 0 0 0 0 0 0       Abuse Screening:      10/2/2023     3:00 PM 2023     1:00 PM   AMB Abuse Screening   Do you ever feel afraid of your partner? N N   Are you in a relationship with someone who physically or mentally threatens you? N N   Is it safe for you to go home? Y Y       Learning Assessment:  No question data found. Fall Risk:       No data to display                    Coordination of Care:   1. \"Have you been to the ER, urgent care clinic since your last visit? Hospitalized since your last visit? \" no    2. \"Have you seen or consulted any other health care providers outside of the [address] [place] [address] since your last visit? \" no    3. For patients aged 43-73: Has the patient had a colonoscopy / FIT/ Cologuard? na    If the patient is female:    4. For patients aged 43-66: Has the patient had a mammogram within the past 2 years? na    5. For patients aged 21-65: Has the patient had a pap smear? due    Health Maintenance: reviewed and discussed and ordered per Provider.     Health Maintenance Due   Topic Date Due    Hepatitis B vaccine (1 of 3 - 3-dose series) Never done    COVID-19 Vaccine (1) Never done    Varicella vaccine (1 of 2 - 2-dose childhood series) Never done    HIV screen  Never done    Hepatitis C screen  Never done    DTaP/Tdap/Td vaccine (1 - Tdap) Never done    Cervical cancer screen  2023    Flu vaccine (1)

## 2023-10-02 NOTE — PROGRESS NOTES
Patient ID: Marti Pang is a 41 y.o. female established patient presents for the following:      Subjective:     Patient presents for follow-up of lab results and for her depression. 10/2/2023     4:14 PM 6/22/2023     1:53 PM 2/6/2023     1:45 PM   PHQ-9    Little interest or pleasure in doing things 0 0 0   Feeling down, depressed, or hopeless 1 0 0   Trouble falling or staying asleep, or sleeping too much 1     Feeling tired or having little energy 1     Poor appetite or overeating 2     Feeling bad about yourself - or that you are a failure or have let yourself or your family down 1     Trouble concentrating on things, such as reading the newspaper or watching television 0     Moving or speaking so slowly that other people could have noticed. Or the opposite - being so fidgety or restless that you have been moving around a lot more than usual 0     Thoughts that you would be better off dead, or of hurting yourself in some way 1     PHQ-2 Score 1 0 0   PHQ-9 Total Score 7 0 0   If you checked off any problems, how difficult have these problems made it for you to do your work, take care of things at home, or get along with other people? 0            10/2/2023     4:15 PM   MACARIO-7 SCREENING   Feeling nervous, anxious, or on edge Not at all   Not being able to stop or control worrying Several days   Worrying too much about different things Several days   Trouble relaxing Several days   Being so restless that it is hard to sit still Not at all   Becoming easily annoyed or irritable Not at all   Feeling afraid as if something awful might happen Not at all   MACARIO-7 Total Score 3   How difficult have these problems made it for you to do your work, take care of things at home, or get along with other people?  Not difficult at all        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    Autoimmune disease ([address]) 2014/2015    Endometriosis    Chronic

## 2023-11-02 ENCOUNTER — OFFICE VISIT (OUTPATIENT)
Facility: CLINIC | Age: 38
End: 2023-11-02
Payer: COMMERCIAL

## 2023-11-02 VITALS
OXYGEN SATURATION: 98 % | DIASTOLIC BLOOD PRESSURE: 83 MMHG | TEMPERATURE: 97.6 F | SYSTOLIC BLOOD PRESSURE: 133 MMHG | HEART RATE: 67 BPM | RESPIRATION RATE: 16 BRPM | BODY MASS INDEX: 39.2 KG/M2 | HEIGHT: 62 IN | WEIGHT: 213 LBS

## 2023-11-02 DIAGNOSIS — I10 ESSENTIAL (PRIMARY) HYPERTENSION: Primary | ICD-10-CM

## 2023-11-02 DIAGNOSIS — F33.9 RECURRENT DEPRESSION (HCC): ICD-10-CM

## 2023-11-02 PROCEDURE — 3074F SYST BP LT 130 MM HG: CPT

## 2023-11-02 PROCEDURE — 3078F DIAST BP <80 MM HG: CPT

## 2023-11-02 PROCEDURE — 99213 OFFICE O/P EST LOW 20 MIN: CPT

## 2023-11-02 SDOH — ECONOMIC STABILITY: FOOD INSECURITY: WITHIN THE PAST 12 MONTHS, YOU WORRIED THAT YOUR FOOD WOULD RUN OUT BEFORE YOU GOT MONEY TO BUY MORE.: NEVER TRUE

## 2023-11-02 SDOH — ECONOMIC STABILITY: FOOD INSECURITY: WITHIN THE PAST 12 MONTHS, THE FOOD YOU BOUGHT JUST DIDN'T LAST AND YOU DIDN'T HAVE MONEY TO GET MORE.: NEVER TRUE

## 2023-11-02 SDOH — ECONOMIC STABILITY: INCOME INSECURITY: HOW HARD IS IT FOR YOU TO PAY FOR THE VERY BASICS LIKE FOOD, HOUSING, MEDICAL CARE, AND HEATING?: NOT HARD AT ALL

## 2023-11-02 ASSESSMENT — ANXIETY QUESTIONNAIRES
GAD7 TOTAL SCORE: 4
IF YOU CHECKED OFF ANY PROBLEMS ON THIS QUESTIONNAIRE, HOW DIFFICULT HAVE THESE PROBLEMS MADE IT FOR YOU TO DO YOUR WORK, TAKE CARE OF THINGS AT HOME, OR GET ALONG WITH OTHER PEOPLE: NOT DIFFICULT AT ALL
2. NOT BEING ABLE TO STOP OR CONTROL WORRYING: 1
4. TROUBLE RELAXING: 1
5. BEING SO RESTLESS THAT IT IS HARD TO SIT STILL: 0
3. WORRYING TOO MUCH ABOUT DIFFERENT THINGS: 1
7. FEELING AFRAID AS IF SOMETHING AWFUL MIGHT HAPPEN: 0
6. BECOMING EASILY ANNOYED OR IRRITABLE: 1
1. FEELING NERVOUS, ANXIOUS, OR ON EDGE: 0

## 2023-11-02 ASSESSMENT — PATIENT HEALTH QUESTIONNAIRE - PHQ9
5. POOR APPETITE OR OVEREATING: 0
SUM OF ALL RESPONSES TO PHQ QUESTIONS 1-9: 3
8. MOVING OR SPEAKING SO SLOWLY THAT OTHER PEOPLE COULD HAVE NOTICED. OR THE OPPOSITE, BEING SO FIGETY OR RESTLESS THAT YOU HAVE BEEN MOVING AROUND A LOT MORE THAN USUAL: 0
SUM OF ALL RESPONSES TO PHQ QUESTIONS 1-9: 3
4. FEELING TIRED OR HAVING LITTLE ENERGY: 1
6. FEELING BAD ABOUT YOURSELF - OR THAT YOU ARE A FAILURE OR HAVE LET YOURSELF OR YOUR FAMILY DOWN: 0
2. FEELING DOWN, DEPRESSED OR HOPELESS: 1
1. LITTLE INTEREST OR PLEASURE IN DOING THINGS: 1
3. TROUBLE FALLING OR STAYING ASLEEP: 0
SUM OF ALL RESPONSES TO PHQ9 QUESTIONS 1 & 2: 2
SUM OF ALL RESPONSES TO PHQ QUESTIONS 1-9: 3
7. TROUBLE CONCENTRATING ON THINGS, SUCH AS READING THE NEWSPAPER OR WATCHING TELEVISION: 0
10. IF YOU CHECKED OFF ANY PROBLEMS, HOW DIFFICULT HAVE THESE PROBLEMS MADE IT FOR YOU TO DO YOUR WORK, TAKE CARE OF THINGS AT HOME, OR GET ALONG WITH OTHER PEOPLE: 0
SUM OF ALL RESPONSES TO PHQ QUESTIONS 1-9: 3
9. THOUGHTS THAT YOU WOULD BE BETTER OFF DEAD, OR OF HURTING YOURSELF: 0

## 2023-11-02 NOTE — PROGRESS NOTES
Leslie Martinez is a 41y.o. year old female who presents in office today for   Chief Complaint   Patient presents with    1 Month Follow-Up     Lopressor Evaluation       Is someone accompanying this pt? YES DAUGHTER    Is the patient using any DME equipment during OV? NO     Depression Screening:       10/2/2023     4:14 PM 2023     1:53 PM 2023     1:45 PM 2023    11:14 AM 1/3/2023    10:10 AM 1/3/2023    10:03 AM 2022     2:04 PM   PHQ-9 Questionaire   Little interest or pleasure in doing things 0 0 0 0 0 0 0   Feeling down, depressed, or hopeless 1 0 0 0 0 0 0   Trouble falling or staying asleep, or sleeping too much 1         Feeling tired or having little energy 1         Poor appetite or overeating 2         Feeling bad about yourself - or that you are a failure or have let yourself or your family down 1         Trouble concentrating on things, such as reading the newspaper or watching television 0         Moving or speaking so slowly that other people could have noticed. Or the opposite - being so fidgety or restless that you have been moving around a lot more than usual 0         Thoughts that you would be better off dead, or of hurting yourself in some way 1         PHQ-9 Total Score 7 0 0 0 0 0 0   If you checked off any problems, how difficult have these problems made it for you to do your work, take care of things at home, or get along with other people? 0             Abuse Screenin/2/2023     3:00 PM 10/2/2023     3:00 PM 2023     1:00 PM   AMB Abuse Screening   Do you ever feel afraid of your partner? N N N   Are you in a relationship with someone who physically or mentally threatens you? N N N   Is it safe for you to go home? Y Y Y       Learning Assessment:  No question data found. Fall Risk:       No data to display                    Coordination of Care:   1. \"Have you been to the ER, urgent care clinic since your last visit?   Hospitalized since your last

## 2023-11-12 ASSESSMENT — ENCOUNTER SYMPTOMS
NAUSEA: 0
SHORTNESS OF BREATH: 0
CHEST TIGHTNESS: 0
DIARRHEA: 0
VOMITING: 0
WHEEZING: 0

## 2023-11-17 ENCOUNTER — OFFICE VISIT (OUTPATIENT)
Age: 38
End: 2023-11-17
Payer: COMMERCIAL

## 2023-11-17 VITALS
HEART RATE: 75 BPM | HEIGHT: 64 IN | OXYGEN SATURATION: 99 % | WEIGHT: 207 LBS | TEMPERATURE: 98 F | DIASTOLIC BLOOD PRESSURE: 89 MMHG | SYSTOLIC BLOOD PRESSURE: 123 MMHG | BODY MASS INDEX: 35.34 KG/M2

## 2023-11-17 DIAGNOSIS — Z98.84 GENERALIZED EXCESS AND REDUNDANT SKIN AFTER BARIATRIC SURGERY: ICD-10-CM

## 2023-11-17 DIAGNOSIS — L98.7 GENERALIZED EXCESS AND REDUNDANT SKIN AFTER BARIATRIC SURGERY: ICD-10-CM

## 2023-11-17 DIAGNOSIS — Z87.898 HISTORY OF PREDIABETES: ICD-10-CM

## 2023-11-17 DIAGNOSIS — E66.9 OBESITY (BMI 30-39.9): ICD-10-CM

## 2023-11-17 DIAGNOSIS — Z90.3 S/P GASTRIC SLEEVE PROCEDURE: ICD-10-CM

## 2023-11-17 DIAGNOSIS — K91.2 POSTGASTRECTOMY MALABSORPTION: ICD-10-CM

## 2023-11-17 DIAGNOSIS — Z90.3 POSTGASTRECTOMY MALABSORPTION: ICD-10-CM

## 2023-11-17 DIAGNOSIS — Z86.79 HISTORY OF HIGH BLOOD PRESSURE: ICD-10-CM

## 2023-11-17 DIAGNOSIS — L30.4 CHAFING: ICD-10-CM

## 2023-11-17 DIAGNOSIS — K91.2 POSTGASTRECTOMY MALABSORPTION: Primary | ICD-10-CM

## 2023-11-17 DIAGNOSIS — Z90.3 POSTGASTRECTOMY MALABSORPTION: Primary | ICD-10-CM

## 2023-11-17 PROCEDURE — 99214 OFFICE O/P EST MOD 30 MIN: CPT | Performed by: REGISTERED NURSE

## 2023-11-17 ASSESSMENT — ENCOUNTER SYMPTOMS
SHORTNESS OF BREATH: 0
CHEST TIGHTNESS: 0
GASTROINTESTINAL NEGATIVE: 1

## 2023-11-18 LAB
25(OH)D3+25(OH)D2 SERPL-MCNC: 29.6 NG/ML (ref 30–100)
ALBUMIN SERPL-MCNC: 4.2 G/DL (ref 3.9–4.9)
ALBUMIN/GLOB SERPL: 1.7 {RATIO} (ref 1.2–2.2)
ALP SERPL-CCNC: 64 IU/L (ref 44–121)
ALT SERPL-CCNC: 13 IU/L (ref 0–32)
AST SERPL-CCNC: 14 IU/L (ref 0–40)
BASOPHILS # BLD AUTO: 0 X10E3/UL (ref 0–0.2)
BASOPHILS NFR BLD AUTO: 0 %
BILIRUB SERPL-MCNC: 0.3 MG/DL (ref 0–1.2)
BUN SERPL-MCNC: 12 MG/DL (ref 6–20)
BUN/CREAT SERPL: 16 (ref 9–23)
CALCIUM SERPL-MCNC: 9.4 MG/DL (ref 8.7–10.2)
CHLORIDE SERPL-SCNC: 102 MMOL/L (ref 96–106)
CO2 SERPL-SCNC: 25 MMOL/L (ref 20–29)
CREAT SERPL-MCNC: 0.75 MG/DL (ref 0.57–1)
EGFRCR SERPLBLD CKD-EPI 2021: 106 ML/MIN/1.73
EOSINOPHIL # BLD AUTO: 0.1 X10E3/UL (ref 0–0.4)
EOSINOPHIL NFR BLD AUTO: 1 %
ERYTHROCYTE [DISTWIDTH] IN BLOOD BY AUTOMATED COUNT: 13.1 % (ref 11.7–15.4)
FERRITIN SERPL-MCNC: 78 NG/ML (ref 15–150)
GLOBULIN SER CALC-MCNC: 2.5 G/DL (ref 1.5–4.5)
GLUCOSE SERPL-MCNC: 85 MG/DL (ref 70–99)
HCT VFR BLD AUTO: 37.7 % (ref 34–46.6)
HGB BLD-MCNC: 11.8 G/DL (ref 11.1–15.9)
IMM GRANULOCYTES # BLD AUTO: 0 X10E3/UL (ref 0–0.1)
IMM GRANULOCYTES NFR BLD AUTO: 0 %
IRON SERPL-MCNC: 66 UG/DL (ref 27–159)
LYMPHOCYTES # BLD AUTO: 3.3 X10E3/UL (ref 0.7–3.1)
LYMPHOCYTES NFR BLD AUTO: 59 %
MCH RBC QN AUTO: 27.8 PG (ref 26.6–33)
MCHC RBC AUTO-ENTMCNC: 31.3 G/DL (ref 31.5–35.7)
MCV RBC AUTO: 89 FL (ref 79–97)
MONOCYTES # BLD AUTO: 0.4 X10E3/UL (ref 0.1–0.9)
MONOCYTES NFR BLD AUTO: 7 %
NEUTROPHILS # BLD AUTO: 1.8 X10E3/UL (ref 1.4–7)
NEUTROPHILS NFR BLD AUTO: 33 %
PLATELET # BLD AUTO: 318 X10E3/UL (ref 150–450)
POTASSIUM SERPL-SCNC: 4.2 MMOL/L (ref 3.5–5.2)
PROT SERPL-MCNC: 6.7 G/DL (ref 6–8.5)
RBC # BLD AUTO: 4.25 X10E6/UL (ref 3.77–5.28)
SODIUM SERPL-SCNC: 140 MMOL/L (ref 134–144)
SPECIMEN STATUS REPORT: NORMAL
VIT B12 SERPL-MCNC: 652 PG/ML (ref 232–1245)
WBC # BLD AUTO: 5.6 X10E3/UL (ref 3.4–10.8)

## 2023-11-21 DIAGNOSIS — E55.9 VITAMIN D DEFICIENCY: Primary | ICD-10-CM

## 2023-11-21 LAB — VIT B1 BLD-SCNC: 101.7 NMOL/L (ref 66.5–200)

## 2023-11-21 NOTE — PROGRESS NOTES
Spoke with pt regarding lab results. Noted low vit D.  Order cholecalciferol 50,000 unit tabs #12 refill 0

## 2024-01-08 ENCOUNTER — CLINICAL DOCUMENTATION (OUTPATIENT)
Facility: HOSPITAL | Age: 39
End: 2024-01-08

## 2024-01-08 ENCOUNTER — HOSPITAL ENCOUNTER (OUTPATIENT)
Facility: HOSPITAL | Age: 39
Discharge: HOME | End: 2024-01-11

## 2024-01-08 NOTE — PROGRESS NOTES
[facility]  [address], [address]      Patient's Name: Kelvin Flores     Age: 38 y.o.  YOB: 1985     Sex: female    Date:   1/8/2024    Height: 5' 4\"   Weight:    207      Lbs.     BMI: 39  Goals; < 200#     Surgery Date: 04/19/2023    Surgeon: Dr. Sarmiento    Starting Weight: 279     Overall Pounds Lost: 72     Procedure: Sleeve      Diet History (reported by patient on diet history form)    Do you smoke: no    Alcohol Intake: None    Meals per day: 5    Diet History:    Wake up at:  6:00 am  Water.  8:00 am egg, avocado, 1/2 breakfast sausage, random grits.  Premier protein cereal, vanilla.   11:00 salad salmon filet 2-3 ounces. Fat free English.  1:00 or 2:00 Canned chicken with hot sauce  Mullens water bottle.  Put flavor in it.  Water comes  5:00-6:00 Dinner:  small portion, spaghetti, Heart of palm noodles, Manwich on a plate with no bun.  Baked chicken and vegetables.     Baby carrots, or Triple Zero yogurt boiled eggs.          Foods being consumed when eating out: asks for a \"to go\" box and eat wisely.     Ounces of fluid consumed per day: > 64 ounces per day.    Fluids being consumed: Circi drinks.      Patient is drinking protein drinks; Premier Protein.    Exercise History    Patient is doing walking and stepping for exercise.          Behavior:      Patient is taking 20-30 minutes to eat a meal.    Patient is  stopping fluid 30 minutes before and after a meal and no fluid with her meal.    Vitamins    Patient is taking 20-30 Multivitamins per day    Patient is taking Calcium in the form of Calcium citrate.  Patient is taking 500 mg per day.    Patient is getting 20 mg of B1 and 1000 mcg of B12 with multivitamin and 4500 IU of Vitamin D with calcium and multivitamin.       Summary: Weight loss is at 72 pounds.  I have reinforced the key diet principles to the patient.  Patient was instructed to follow a 3 meal a

## 2024-01-23 RX ORDER — ATORVASTATIN CALCIUM 20 MG/1
20 TABLET, FILM COATED ORAL DAILY
Qty: 90 TABLET | Refills: 1 | Status: SHIPPED | OUTPATIENT
Start: 2024-01-23

## 2024-01-23 NOTE — TELEPHONE ENCOUNTER
Medication(s) requesting:   Requested Prescriptions     Pending Prescriptions Disp Refills    atorvastatin (LIPITOR) 20 MG tablet [Pharmacy Med Name: ATORVASTATIN 20MG TABLETS] 90 tablet 1     Sig: TAKE 1 TABLET BY MOUTH DAILY       Last office visit:  11/02/2023  Next office visit DMA: Visit date not found  FUTURE APPT:   Future Appointments   Date Time Provider Department Center   1/30/2024  3:15 PM Pipo Butler MD [facility] BS AMB   3/11/2024  9:45 AM [facility] DIETITIAN [facility]   4/19/2024 11:30 AM Fatuma Arellano APRN - NP [facility] BS AMB

## 2024-01-30 ENCOUNTER — OFFICE VISIT (OUTPATIENT)
Age: 39
End: 2024-01-30
Payer: MEDICAID

## 2024-01-30 VITALS
HEART RATE: 71 BPM | OXYGEN SATURATION: 99 % | SYSTOLIC BLOOD PRESSURE: 112 MMHG | WEIGHT: 204 LBS | BODY MASS INDEX: 35.02 KG/M2 | DIASTOLIC BLOOD PRESSURE: 70 MMHG

## 2024-01-30 DIAGNOSIS — E78.00 PURE HYPERCHOLESTEROLEMIA: ICD-10-CM

## 2024-01-30 DIAGNOSIS — I10 ESSENTIAL HYPERTENSION WITH GOAL BLOOD PRESSURE LESS THAN 140/90: Primary | ICD-10-CM

## 2024-01-30 PROCEDURE — 3078F DIAST BP <80 MM HG: CPT | Performed by: INTERNAL MEDICINE

## 2024-01-30 PROCEDURE — 99213 OFFICE O/P EST LOW 20 MIN: CPT | Performed by: INTERNAL MEDICINE

## 2024-01-30 PROCEDURE — 3074F SYST BP LT 130 MM HG: CPT | Performed by: INTERNAL MEDICINE

## 2024-01-30 NOTE — PROGRESS NOTES
[facility]    Kelvin Flores is 38 y.o. female     Denies any resting or exertional chest pain or chest pressure to suggest angina or any dyspnea to suggest heart failure.   No presyncope or syncope  Denies any PND or LE edema.   Taking all medications regularly.   Denies any nausea, vomiting, abdominal pain, fever, chills, sputum production. 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 All others negative.    Current Outpatient Medications   Medication Sig    Cholecalciferol 1.25 MG (07954 UT) TABS Take 1 tablet by mouth every 7 days for 12 doses    sertraline (ZOLOFT) 50 MG tablet Take 1 tablet by mouth daily    BIOTIN PO Take by mouth    nystatin (MYCOSTATIN) 128572 UNIT/GM powder Apply to affected area four times daily as needed for itching    albuterol sulfate HFA (PROVENTIL;VENTOLIN;PROAIR) 108 (90 Base) MCG/ACT inhaler INHALE 1 PUFF BY MOUTH EVERY 6 HOURS AS NEEDED FOR WHEEZING (Patient not taking: Reported on 8/16/2023)    calcium citrate (CALCITRATE) 950 (200 Ca) MG tablet Take 1 tablet by mouth 3 times daily    Multiple Vitamins-Minerals (BARIATRIC MULTIVITAMINS/IRON PO) Take by mouth    Ursodiol (RELTONE) 200 MG CAPS Take 200 mg by mouth See Admin Instructions Take 1 capsule in the morning and take 2 capsules in the evening.    fluticasone (FLONASE) 50 MCG/ACT nasal spray USE 2 SPRAYS IN EACH NOSTRIL EVERY NIGHT AT BEDTIME    levonorgestrel (MIRENA) IUD 52 mg 1 Device by IntraUTERine route once

## 2024-03-11 ENCOUNTER — HOSPITAL ENCOUNTER (OUTPATIENT)
Facility: HOSPITAL | Age: 39
Discharge: HOME | End: 2024-03-14

## 2024-03-13 ENCOUNTER — CLINICAL DOCUMENTATION (OUTPATIENT)
Facility: HOSPITAL | Age: 39
End: 2024-03-13

## 2024-03-13 NOTE — PROGRESS NOTES
FILOMENA reached out to patient.  Patient is doing well working on SMART goals.  Future SMART goals are:   Improving her sleep.  After meal walking.  She bethany like to be at 175# by the end of April.    She shares that she is < 198# and is very pleased with this progress.  We are set to chat in the future.   Nadja Olivera RD

## 2024-03-14 DIAGNOSIS — E55.9 VITAMIN D DEFICIENCY: ICD-10-CM

## 2024-03-15 DIAGNOSIS — E55.9 VITAMIN D DEFICIENCY: ICD-10-CM

## 2024-03-15 RX ORDER — CHOLECALCIFEROL (VITAMIN D3) 1250 MCG
50000 CAPSULE ORAL WEEKLY
Qty: 4 CAPSULE | Refills: 0 | Status: SHIPPED | OUTPATIENT
Start: 2024-03-15 | End: 2024-04-06

## 2024-03-19 RX ORDER — CHOLECALCIFEROL (VITAMIN D3) 1250 MCG
CAPSULE ORAL
Qty: 12 CAPSULE | OUTPATIENT
Start: 2024-03-19

## 2024-04-09 ENCOUNTER — HOSPITAL ENCOUNTER (OUTPATIENT)
Facility: HOSPITAL | Age: 39
Discharge: HOME | End: 2024-04-12

## 2024-04-09 LAB — SENTARA SPECIMEN COLLECTION: NORMAL

## 2024-04-09 PROCEDURE — 99001 SPECIMEN HANDLING PT-LAB: CPT

## 2024-04-10 LAB
A/G RATIO: 1.7 RATIO (ref 1.1–2.6)
ALBUMIN SERPL-MCNC: 3.9 G/DL (ref 3.5–5)
ALP BLD-CCNC: 50 U/L (ref 25–115)
ALT SERPL-CCNC: 12 U/L (ref 5–40)
ANION GAP SERPL CALCULATED.3IONS-SCNC: 7 MMOL/L (ref 3–15)
AST SERPL-CCNC: 12 U/L (ref 10–37)
BASOPHILS # BLD: 0 % (ref 0–2)
BASOPHILS ABSOLUTE: 0 K/UL (ref 0–0.2)
BILIRUB SERPL-MCNC: 0.5 MG/DL (ref 0.2–1.2)
BUN BLDV-MCNC: 12 MG/DL (ref 6–22)
CALCIUM SERPL-MCNC: 9.2 MG/DL (ref 8.4–10.5)
CHLORIDE BLD-SCNC: 105 MMOL/L (ref 98–110)
CHOLESTEROL/HDL RATIO: 3.5 (ref 0–5)
CHOLESTEROL: 175 MG/DL (ref 110–200)
CO2: 27 MMOL/L (ref 20–32)
CREAT SERPL-MCNC: 0.7 MG/DL (ref 0.5–1.2)
EOSINOPHIL # BLD: 1 % (ref 0–6)
EOSINOPHILS ABSOLUTE: 0 K/UL (ref 0–0.5)
ESTIMATED AVERAGE GLUCOSE: 116 MG/DL (ref 91–123)
FERRITIN: 69 NG/ML (ref 10–291)
GLOBULIN: 2.3 G/DL (ref 2–4)
GLOMERULAR FILTRATION RATE: >60 ML/MIN/1.73 SQ.M.
GLUCOSE: 89 MG/DL (ref 70–99)
HBA1C MFR BLD: 5.7 % (ref 4.8–5.6)
HCT VFR BLD CALC: 37.7 % (ref 35.1–46.5)
HDLC SERPL-MCNC: 50 MG/DL
HEMOGLOBIN: 11.6 G/DL (ref 11.7–15.5)
IRON: 68 MCG/DL (ref 30–160)
LDL CHOLESTEROL CALCULATED: 113 MG/DL (ref 50–99)
LDL/HDL RATIO: 2.3
LYMPHOCYTES # BLD: 52 % (ref 20–45)
LYMPHOCYTES ABSOLUTE: 2.3 K/UL (ref 1–4.8)
MCH RBC QN AUTO: 29 PG (ref 26–34)
MCHC RBC AUTO-ENTMCNC: 31 G/DL (ref 31–36)
MCV RBC AUTO: 95 FL (ref 80–99)
MONOCYTES ABSOLUTE: 0.3 K/UL (ref 0.1–1)
MONOCYTES: 6 % (ref 3–12)
NEUTROPHILS ABSOLUTE: 1.9 K/UL (ref 1.8–7.7)
NEUTROPHILS: 42 % (ref 40–75)
NON-HDL CHOLESTEROL: 125 MG/DL
PDW BLD-RTO: 13.3 % (ref 10–15.5)
PLATELET # BLD: 274 K/UL (ref 140–440)
PMV BLD AUTO: 9.7 FL (ref 9–13)
POTASSIUM SERPL-SCNC: 4.2 MMOL/L (ref 3.5–5.5)
RBC: 3.97 M/UL (ref 3.8–5.2)
SODIUM BLD-SCNC: 139 MMOL/L (ref 133–145)
TOTAL PROTEIN: 6.2 G/DL (ref 6.4–8.3)
TRIGL SERPL-MCNC: 57 MG/DL (ref 40–149)
VITAMIN B-12: 1374 PG/ML (ref 211–911)
VITAMIN D 25-HYDROXY: 45.9 NG/ML (ref 32–100)
VLDLC SERPL CALC-MCNC: 11 MG/DL (ref 8–30)
WBC: 4.5 K/UL (ref 4–11)

## 2024-04-12 NOTE — TELEPHONE ENCOUNTER
Medication(s) requesting:   Requested Prescriptions     Pending Prescriptions Disp Refills    metFORMIN (GLUCOPHAGE) 500 MG tablet [Pharmacy Med Name: METFORMIN 500MG TABLETS] 90 tablet 1     Sig: TAKE 1 TABLET BY MOUTH TWICE DAILY WITH MEALS       Last office visit:  11/2/23  Next office visit DMA: Visit date not found  FUTURE APPT:   Future Appointments   Date Time Provider Department Center   4/22/2024  8:45 AM Fatuma Arellano APRN - NP [facility] BS AMB   5/20/2024  9:45 AM [facility] DIETITIAN [facility]

## 2024-04-14 LAB — THIAMINE BLOOD: 96 NMOL/L (ref 78–185)

## 2024-04-22 ENCOUNTER — OFFICE VISIT (OUTPATIENT)
Age: 39
End: 2024-04-22
Payer: MEDICAID

## 2024-04-22 VITALS
RESPIRATION RATE: 16 BRPM | TEMPERATURE: 98.1 F | BODY MASS INDEX: 33.63 KG/M2 | HEIGHT: 64 IN | DIASTOLIC BLOOD PRESSURE: 82 MMHG | SYSTOLIC BLOOD PRESSURE: 122 MMHG | HEART RATE: 63 BPM | OXYGEN SATURATION: 100 % | WEIGHT: 197 LBS

## 2024-04-22 DIAGNOSIS — L98.7 GENERALIZED EXCESS AND REDUNDANT SKIN AFTER BARIATRIC SURGERY: ICD-10-CM

## 2024-04-22 DIAGNOSIS — Z90.3 S/P GASTRIC SLEEVE PROCEDURE: ICD-10-CM

## 2024-04-22 DIAGNOSIS — Z90.3 POSTGASTRECTOMY MALABSORPTION: Primary | ICD-10-CM

## 2024-04-22 DIAGNOSIS — Z98.84 GENERALIZED EXCESS AND REDUNDANT SKIN AFTER BARIATRIC SURGERY: ICD-10-CM

## 2024-04-22 DIAGNOSIS — E55.9 VITAMIN D DEFICIENCY: ICD-10-CM

## 2024-04-22 DIAGNOSIS — K91.2 POSTGASTRECTOMY MALABSORPTION: Primary | ICD-10-CM

## 2024-04-22 DIAGNOSIS — E66.9 OBESITY (BMI 30.0-34.9): ICD-10-CM

## 2024-04-22 PROCEDURE — 99214 OFFICE O/P EST MOD 30 MIN: CPT | Performed by: REGISTERED NURSE

## 2024-04-22 ASSESSMENT — ENCOUNTER SYMPTOMS
SHORTNESS OF BREATH: 0
CHEST TIGHTNESS: 0
ROS SKIN COMMENTS: IRRITATION FROM EXCESS SKIN
GASTROINTESTINAL NEGATIVE: 1

## 2024-04-22 NOTE — PROGRESS NOTES
Bariatric Postoperative Nurse Note      Kelvin NURIS Flores is a 39 y.o. female status post laparoscopic sleeve gastrectomy performed on 4/19/2023.      All Post-Ops (including two weeks)  -# of grams of protein daily? 90 g  -sources of protein? Chicken, fish, protein shakes  -# of oz of sugar free fluids from all sources daily?  64 plus  -Nausea? No  -Vomiting? No  -Difficulty swallow/food sticking? No  -Heartburn/regurgitation? Yes, triggered by acidic foods  -Character of bowel movements (diarrhea/constipation/bloody stools?) regular  -Which multivitamin product are you taking? Bariatric Advantage   -What dose and how frequently are you taking calcium citrate? Twice daily  - from any iron-containing multivitamin by 2 hours? No  -Ulcer risk exposures:   NSAID No  Tobacco No  Alcohol No  Steroids No  -Minutes of physical activity and what type? 3 times a week       
04/09/24  9:40 AM   Result Value Ref Range    Hemoglobin A1C 5.7 (H) 4.8 - 5.6 %    Estimated Avg Glucose 116 91 - 123 mg/dL   [facility] specimen collection    Collection Time: 04/09/24 12:15 PM   Result Value Ref Range    [facility] Specimen Collection Specimens collected/sent to [facility]       Assessment/Plan:   She is currently 1 year s/p laparoscopic sleeve gastrectomy with a total weight loss of 74 lbs to date.  Labs were reviewed.  History of vitamin D deficiency.  Refill cholecalciferol 50,000 unit tabs weekly #12. Pt was instructed to continue MVI/B1/B12/dawit/vit D supplementation.    Continue nystatin powder for itching and skin irritation under pannus/arms. Advised to wear support garments, keep area clean/dry, and avoid scratching to prevent skin breakdown.  List of plastic surgeons provided.    We reviewed the components of a successful postoperative course including requirement for a high protein, low carbohydrate diet, 64 oz a day of zero calorie liquids, daily vitamin supplementation, daily exercise (150 mis/week), regular follow-up, and participation in support groups.    Refer to registered dietitian for more detailed medical nutrition therapy as needed.     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 Diagnoses of Vitamin D deficiency, S/P gastric sleeve procedure, Obesity (BMI 30.0-34.9), and Generalized excess and redundant skin after bariatric surgery were also pertinent to this visit.     Return in about 6 months (around 10/22/2024) for Plastic surgery referral.     Sooner as needed.  TED Coppola - ARANZA

## 2024-04-22 NOTE — PATIENT INSTRUCTIONS
Dr. MALAIKA Downing  [facility]  Phone: [phone]  [address] [place] [address]    Dr. Lester Lai  [facility]  Phone: [phone], [address]  [place] [address]  [address], [address]  [place] [address]    Dr. Alan Kwan [facility]  Phone: [phone] [address]  [place] [address]    Dr. Jennifer Mosqueda  [facility]  Phone [phone] [address] [place] [address]    [address]  [place] [address]    Dr. Yousuf Arce  [address], [address]  [place], [place] [address] [phone]    Dr. VERÓNICA Cazares  [address] [place], [place] [address] [phone]    Dr. Juan Michelle  [facility]   [address]  [place], [place] [id number] [phone]    [facility]  Multiple locations in [place], [place]  [phone]

## 2024-05-20 ENCOUNTER — HOSPITAL ENCOUNTER (OUTPATIENT)
Facility: HOSPITAL | Age: 39
Discharge: HOME | End: 2024-05-23

## 2024-05-23 ENCOUNTER — CLINICAL DOCUMENTATION (OUTPATIENT)
Facility: HOSPITAL | Age: 39
End: 2024-05-23

## 2024-05-23 NOTE — PROGRESS NOTES
RD reached out to patient to review previously set SMART goals.  Patient has made positive changes.    Current weight is 175#.    Future SMART goals are:  Return to swimming 2 times per week.  Refill vitamins that are getting low.    Since Tuesday- Thursday are her hardest days.  She will meal prep on Monday.  She may try on her \"goal clothes\" as well to remind her to \"press on\".    We will jaspreet in a few months.  Nadja Olivera RD

## 2024-07-20 DIAGNOSIS — B96.89 ACUTE BACTERIAL SINUSITIS: ICD-10-CM

## 2024-07-20 DIAGNOSIS — J01.90 ACUTE BACTERIAL SINUSITIS: ICD-10-CM

## 2024-07-22 NOTE — TELEPHONE ENCOUNTER
Medication(s) requesting:   Requested Prescriptions     Pending Prescriptions Disp Refills    albuterol sulfate HFA (PROVENTIL;VENTOLIN;PROAIR) 108 (90 Base) MCG/ACT inhaler [Pharmacy Med Name: ALBUTEROL HFA INH(200 PUFFS) 18GM] 18 g 5     Sig: INHALE 1 PUFF BY MOUTH EVERY 6 HOURS AS NEEDED FOR WHEEZING       Last office visit:  11/02/2024  Next office visit DMA: Visit date not found

## 2024-07-23 ENCOUNTER — HOSPITAL ENCOUNTER (OUTPATIENT)
Facility: HOSPITAL | Age: 39
Discharge: HOME | End: 2024-07-26

## 2024-07-23 RX ORDER — ALBUTEROL SULFATE 90 UG/1
AEROSOL, METERED RESPIRATORY (INHALATION)
Qty: 18 G | Refills: 5 | Status: SHIPPED | OUTPATIENT
Start: 2024-07-23

## 2024-07-27 ENCOUNTER — CLINICAL DOCUMENTATION (OUTPATIENT)
Facility: HOSPITAL | Age: 39
End: 2024-07-27

## 2024-09-18 DIAGNOSIS — Z98.84 GENERALIZED EXCESS AND REDUNDANT SKIN AFTER BARIATRIC SURGERY: ICD-10-CM

## 2024-09-18 DIAGNOSIS — E66.01 MORBID OBESITY (HCC): ICD-10-CM

## 2024-09-18 DIAGNOSIS — L98.7 GENERALIZED EXCESS AND REDUNDANT SKIN AFTER BARIATRIC SURGERY: ICD-10-CM

## 2024-09-18 DIAGNOSIS — Z90.3 S/P GASTRIC SLEEVE PROCEDURE: ICD-10-CM

## 2024-09-18 DIAGNOSIS — Z90.3 POSTGASTRECTOMY MALABSORPTION: ICD-10-CM

## 2024-09-18 DIAGNOSIS — L30.4 CHAFING: ICD-10-CM

## 2024-09-18 DIAGNOSIS — K91.2 POSTGASTRECTOMY MALABSORPTION: ICD-10-CM

## 2024-09-18 RX ORDER — NYSTATIN 100000 [USP'U]/G
POWDER TOPICAL
Qty: 30 G | Refills: 2 | Status: SHIPPED | OUTPATIENT
Start: 2024-09-18

## 2024-09-18 RX ORDER — ATORVASTATIN CALCIUM 20 MG/1
20 TABLET, FILM COATED ORAL DAILY
Qty: 90 TABLET | Refills: 1 | OUTPATIENT
Start: 2024-09-18

## 2024-09-18 NOTE — TELEPHONE ENCOUNTER
Medication(s) requesting:   Requested Prescriptions     Pending Prescriptions Disp Refills    atorvastatin (LIPITOR) 20 MG tablet [Pharmacy Med Name: ATORVASTATIN 20MG TABLETS] 90 tablet 1     Sig: TAKE 1 TABLET BY MOUTH DAILY       Last office visit:  11.2.23  Next office visit DMA: Visit date not found

## 2024-10-28 ENCOUNTER — OFFICE VISIT (OUTPATIENT)
Age: 39
End: 2024-10-28
Payer: MEDICAID

## 2024-10-28 VITALS
TEMPERATURE: 98 F | DIASTOLIC BLOOD PRESSURE: 84 MMHG | HEART RATE: 67 BPM | OXYGEN SATURATION: 100 % | WEIGHT: 194 LBS | HEIGHT: 64 IN | BODY MASS INDEX: 33.12 KG/M2 | SYSTOLIC BLOOD PRESSURE: 128 MMHG

## 2024-10-28 DIAGNOSIS — L30.4 CHAFING: ICD-10-CM

## 2024-10-28 DIAGNOSIS — R11.0 NAUSEA: ICD-10-CM

## 2024-10-28 DIAGNOSIS — Z98.84 GENERALIZED EXCESS AND REDUNDANT SKIN AFTER BARIATRIC SURGERY: ICD-10-CM

## 2024-10-28 DIAGNOSIS — Z90.3 S/P GASTRIC SLEEVE PROCEDURE: ICD-10-CM

## 2024-10-28 DIAGNOSIS — E66.811 OBESITY (BMI 30.0-34.9): ICD-10-CM

## 2024-10-28 DIAGNOSIS — L98.7 GENERALIZED EXCESS AND REDUNDANT SKIN AFTER BARIATRIC SURGERY: ICD-10-CM

## 2024-10-28 DIAGNOSIS — Z90.3 POSTGASTRECTOMY MALABSORPTION: Primary | ICD-10-CM

## 2024-10-28 DIAGNOSIS — K91.2 POSTGASTRECTOMY MALABSORPTION: Primary | ICD-10-CM

## 2024-10-28 PROCEDURE — 99214 OFFICE O/P EST MOD 30 MIN: CPT | Performed by: REGISTERED NURSE

## 2024-10-28 RX ORDER — NYSTATIN 100000 [USP'U]/G
POWDER TOPICAL
Qty: 30 G | Refills: 2 | Status: SHIPPED | OUTPATIENT
Start: 2024-10-28

## 2024-10-28 RX ORDER — FAMOTIDINE 20 MG/1
20 TABLET, FILM COATED ORAL 2 TIMES DAILY PRN
Qty: 60 TABLET | Refills: 2 | Status: SHIPPED | OUTPATIENT
Start: 2024-10-28 | End: 2025-01-26

## 2024-10-28 ASSESSMENT — ENCOUNTER SYMPTOMS
SHORTNESS OF BREATH: 0
CHEST TIGHTNESS: 0
ROS SKIN COMMENTS: IRRITATION FROM EXCESS SKIN

## 2024-10-28 NOTE — PROGRESS NOTES
Bariatric Postoperative Nurse Note    Kelvin NURIS Flores is a 39 y.o. female status post laparoscopic sleeve gastrectomy performed on 4/19/23.    All Post-Ops (including two weeks)  -# of grams of protein daily? 60 g   -sources of protein? Tuna, chicken, shrimp, protein shake  -# of oz of sugar free fluids from all sources daily?  64 oz   -Nausea? Yes only with breakfast in the morning  -Vomiting? No  -Difficulty swallow/food sticking? No  -Heartburn/regurgitation? No  -Character of bowel movements (diarrhea/constipation/bloody stools?) regular  -Which multivitamin product are you taking? Bariatric Advantage   -What dose and how frequently are you taking calcium citrate? TID  - from any iron-containing multivitamin by 2 hours? Yes  -Ulcer risk exposures:   NSAID No  Tobacco No  Alcohol No  Steroids No  -Minutes of physical activity and what type? Yoga and walking 4 days a week

## 2024-10-28 NOTE — PATIENT INSTRUCTIONS
Dr. MALAIKA Downing  [facility]  Phone: [phone]  [address] [place] [address]    Dr. Lester Lai  [facility]  Phone: [phone]  [place] [address]  [address], [address]  [place] [address]    Dr. Alan Kwan [facility]  Phone: [phone] [address]  [place] [address]    Dr. Jennifer Mosqueda  [facility]  Phone [phone] [address] [address]    [address]  [place] [address]    Dr. Yousuf Arce  [address], [address]  [place], [place] [address] [phone]    Dr. VERÓNICA Cazares  [address] [place], [place] [address] [phone]    Dr. Juan Michelle  [facility]   [address]  [place], [place] [id number] [phone]    [facility]  Multiple locations in [place], [place]  [phone]

## 2024-10-28 NOTE — PROGRESS NOTES
Bariatric Postoperative Progress Note    Chief Complaint   Patient presents with    Follow-up     Sleeve gastrectomy 4/19/23     Kelvin Flores is a 39 y.o. female now 1.5 years status post laparoscopic sleeve gastrectomy performed on 4/19/23.    Here for weight check. -3 lbs since last seen. Doing well overall. Reports life stressors but coping effectively. States she experiences mild nausea after eating breakfast. No abdominal discomfort, reflux symptoms, and vomiting.     C/o continued skin irritation under pannus, hangs below genitalia. Expressed interest in panniculectomy.      Goal weight: 175 lbs         10/28/2024     8:00 AM 4/22/2024     1:00 PM 1/30/2024     3:35 PM 11/17/2023    11:39 AM 11/2/2023     3:50 PM 10/2/2023     3:30 PM 8/16/2023     3:00 PM   Weight Loss Metrics   Pre-op weight (manual entry) 271 lbs 271 lbs  271 lbs   271 lbs   Height 5' 4\" 5' 4\"  5' 4\" 5' 1.5\" 5' 1.5\" 5' 1.5\"   Weight - Scale 194 lbs 197 lbs 204 lbs 207 lbs 213 lbs 215 lbs 221 lbs   BMI (Calculated) 33.4 kg/m2 33.9 kg/m2 0 kg/m2 35.6 kg/m2 39.7 kg/m2 40 kg/m2 41.2 kg/m2   Ideal body weight (manual entry) 131 lbs 131 lbs  131 lbs   131 lbs   EBW in lbs. 140 140  140   140   Weight loss to date in lbs. 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

## 2024-10-29 ASSESSMENT — ENCOUNTER SYMPTOMS: NAUSEA: 1

## 2024-10-30 ENCOUNTER — OFFICE VISIT (OUTPATIENT)
Age: 39
End: 2024-10-30

## 2024-10-30 VITALS — TEMPERATURE: 98.6 F | BODY MASS INDEX: 37 KG/M2 | WEIGHT: 196 LBS | HEIGHT: 61 IN

## 2024-10-30 DIAGNOSIS — M25.552 PAIN IN LEFT HIP: Primary | ICD-10-CM

## 2024-10-30 RX ORDER — TRIAMCINOLONE ACETONIDE 40 MG/ML
80 INJECTION, SUSPENSION INTRA-ARTICULAR; INTRAMUSCULAR ONCE
Status: COMPLETED | OUTPATIENT
Start: 2024-10-30 | End: 2024-10-30

## 2024-10-30 RX ORDER — BUPIVACAINE HYDROCHLORIDE 5 MG/ML
7 INJECTION, SOLUTION PERINEURAL ONCE
Status: COMPLETED | OUTPATIENT
Start: 2024-10-30 | End: 2024-10-30

## 2024-10-30 RX ADMIN — BUPIVACAINE HYDROCHLORIDE 35 MG: 5 INJECTION, SOLUTION PERINEURAL at 09:48

## 2024-10-30 RX ADMIN — TRIAMCINOLONE ACETONIDE 80 MG: 40 INJECTION, SUSPENSION INTRA-ARTICULAR; INTRAMUSCULAR at 09:49

## 2024-10-30 NOTE — PROGRESS NOTES
deformity seen.                                      IMPRESSION:       1.  Recurrent left hip pain  2.  Decreased range of motion left hip  3.  Recurrent IT band syndrome left hip/femur  4.  Decrease in BMI previous 47.38 at 276 pounds with patient currently at 37.0 396 pounds.  5.  Left hip osteoarthritis      .      PLAN: Recommending a MRI of the left hip as well as a low-dose cortisone injection for the greater trochanteric region of the left femur.      Procedural: Using sterile technique and verbal and written consent were obtained appropriate timeout formed patient laying right recumbent on the exam table with her left hip draped appropriately to reveal skin is him joined by Chelsea CASILLAS as my chaperone 2 cc of Kenalog and 40 mg/mL mixed with 7 mL of 0.5% Marcaine administered over the point of maximal tenderness consistent with the greater trochanteric region.  There were no complications.  Patient tolerated the procedure well.       Chart reviewed for the following:    Jayce WESTFALL PA-C, have reviewed the History, Physical and updated the Allergic reactions for Kelvin L Mark      TIME OUT performed immediately prior to start of procedure:    Jayce WESTFALL PA-C, have performed the following reviews on Kelvin L Mark prior to the start of the procedure:              * Patient was identified by name and date of birth    * Agreement on procedure being performed was verified   * Risks and Benefits explained to the patient   * Procedure site verified and marked as necessary   * Patient was positioned for comfort   * Consent was signed and verified               10/30/2024 at 0 9:30 AM      Procedure performed by:  Jayce Lui PA-C      Provider assisted by: None       Patient assisted by: self      How tolerated by patient: tolerated the procedure well with no complications      Comments: none      The patient is asked to continue to rest the area for a few more days before resuming

## 2024-11-02 ENCOUNTER — TELEPHONE (OUTPATIENT)
Age: 39
End: 2024-11-02

## 2024-11-02 DIAGNOSIS — Z90.3 S/P GASTRIC SLEEVE PROCEDURE: ICD-10-CM

## 2024-11-02 DIAGNOSIS — L30.4 CHAFING: ICD-10-CM

## 2024-11-02 DIAGNOSIS — Z98.84 GENERALIZED EXCESS AND REDUNDANT SKIN AFTER BARIATRIC SURGERY: Primary | ICD-10-CM

## 2024-11-02 DIAGNOSIS — L98.7 GENERALIZED EXCESS AND REDUNDANT SKIN AFTER BARIATRIC SURGERY: Primary | ICD-10-CM

## 2024-11-02 NOTE — TELEPHONE ENCOUNTER
Referral sent to Dr. Davila, [facility]    Discussed that experiencing loose or sagging skin after bariatric surgery is a natural consequence of the body’s rapid and substantial weight reduction. When skin is stretched for an extended period due to excessive weight, its elasticity can be compromised. While the body adjusts to its new contours over time, the excess skin can remain, causing discomfort and affecting body image. Will refer to plastics if desired but discussed that it may not be covered by insurance. Pt verbalized understanding.

## 2024-11-06 ENCOUNTER — OFFICE VISIT (OUTPATIENT)
Age: 39
End: 2024-11-06

## 2024-11-06 VITALS — BODY MASS INDEX: 37 KG/M2 | WEIGHT: 196 LBS | TEMPERATURE: 97.7 F | HEIGHT: 61 IN

## 2024-11-06 DIAGNOSIS — M25.651 DECREASED RANGE OF RIGHT HIP MOVEMENT: ICD-10-CM

## 2024-11-06 DIAGNOSIS — M76.31 IT BAND SYNDROME, RIGHT: ICD-10-CM

## 2024-11-06 DIAGNOSIS — M16.11 ARTHRITIS OF RIGHT HIP: ICD-10-CM

## 2024-11-06 DIAGNOSIS — M70.61 TROCHANTERIC BURSITIS, RIGHT HIP: ICD-10-CM

## 2024-11-06 DIAGNOSIS — M25.551 RIGHT HIP PAIN: Primary | ICD-10-CM

## 2024-11-06 RX ORDER — BUPIVACAINE HYDROCHLORIDE 5 MG/ML
7 INJECTION, SOLUTION PERINEURAL ONCE
Status: COMPLETED | OUTPATIENT
Start: 2024-11-06 | End: 2024-11-06

## 2024-11-06 RX ORDER — TRIAMCINOLONE ACETONIDE 40 MG/ML
80 INJECTION, SUSPENSION INTRA-ARTICULAR; INTRAMUSCULAR ONCE
Status: COMPLETED | OUTPATIENT
Start: 2024-11-06 | End: 2024-11-06

## 2024-11-06 RX ADMIN — BUPIVACAINE HYDROCHLORIDE 7 MG: 5 INJECTION, SOLUTION PERINEURAL at 09:41

## 2024-11-06 RX ADMIN — TRIAMCINOLONE ACETONIDE 80 MG: 40 INJECTION, SUSPENSION INTRA-ARTICULAR; INTRAMUSCULAR at 09:42

## 2024-11-06 NOTE — PROGRESS NOTES
symptom management.              Pain Assessment    12/5/2022       Location of Pain    Location Modifiers       Result of Injury                    Lab Results         Component                             Weight Metrics  12/5/2022 11/7/2022 9/6/2022 8/8/2022 7/12/2022 5/20/2022 4/22/2022              Weight              BMI                      Problem List Items Addressed This Visit      None       Visit Diagnoses                 Acute hip pain, left    -  Primary       Relevant Medications                                                PAST MEDICAL HISTORY:        Past Medical History:       Diagnosis                              PAST SURGICAL HISTORY:        Past Surgical History:         Procedure                               ALLERGIES:        Allergies        Allergen                              CURRENT MEDICATIONS:  A list of medications prior to the time of admission include:       Prior to Admission medications            Medication           diclofenac (VOLTAREN) 1 % gel    fluticasone propionate (FLONASE) 50 mcg/actuation nasal spray           levonorgestreL (MIRENA) 20 mcg/24 hours (5 yrs) 52 mg IUD           FAMILY HISTORY:        Family History         Problem                     SOCIAL HISTORY:        Social History            Socioeconomic History                                   Tobacco Use                          Substance and Sexual Activity                                                 Other Topics                               ROS:No CP, No SOB, No fever/chills nor night sweats. No headaches, vision abnormalities to include double and or loss of vision. No dizziness. No hearing abnormalities.  No Chest Pain nor Shortness  of breath.  Pt denies h/o spinal surgery, injections, or PT/chiropractor. Patient has attempted self treatment with less than adequate relief on oral and topical analgesic / anti inflammatory medications . Pt denies change in bowel or bladder habits.  No saddle

## 2024-11-08 DIAGNOSIS — E55.9 VITAMIN D DEFICIENCY: ICD-10-CM

## 2024-11-08 NOTE — TELEPHONE ENCOUNTER
Medication(s) requesting:   Requested Prescriptions     Pending Prescriptions Disp Refills    SUMAtriptan (IMITREX) 50 MG tablet [Pharmacy Med Name: SUMATRIPTAN 50MG TABLETS] 9 tablet 0     Sig: TAKE 1 TABLET BY MOUTH DAILY AS NEEDED FOR MIGRAINE       Last office visit:  11.2.23  Next office visit DMA: Visit date not found

## 2024-11-11 RX ORDER — SUMATRIPTAN 50 MG/1
TABLET, FILM COATED ORAL
Qty: 9 TABLET | Refills: 0 | OUTPATIENT
Start: 2024-11-11

## 2024-11-11 RX ORDER — CHOLECALCIFEROL (VITAMIN D3) 1250 MCG
CAPSULE ORAL
Qty: 4 CAPSULE | Refills: 0 | OUTPATIENT
Start: 2024-11-11

## 2024-11-15 ENCOUNTER — HOSPITAL ENCOUNTER (OUTPATIENT)
Facility: HOSPITAL | Age: 39
Setting detail: RECURRING SERIES
Discharge: HOME | End: 2024-11-18
Payer: MEDICAID

## 2024-11-15 PROCEDURE — 97161 PT EVAL LOW COMPLEX 20 MIN: CPT

## 2024-11-15 NOTE — PROGRESS NOTES
[facility]  [address] [place] [address] Phone: [phone] Fax   Plan of Care / Statement of Necessity for Physical Therapy Services     Patient Name: Kelvin Flores : 1985   Medical   Diagnosis: Pain in right hip [M25.551] Treatment Diagnosis: M25.551  RIGHT HIP PAIN    Onset Date: 2024 Payor: Payor: [facility] MEDICAID / Plan: [facility] CARDINAL CARE / Product Type: *No Product type* /    Referral Source: Jayce Lui PA-C Start of Care (SOC): 11/15/2024   Prior Hospitalization: See medical history Provider #: [id number]   Prior Level of Function: Independent and recreationally active   Comorbidities: Morbid obesity and Other: HTN, Depression, Gastric Sleeve , LBP with hx BLE paraesthesia   Medications: Verified on Patient Summary List     Assessment / key information:  Patient is a 39 y.o. female presenting with CC chronic B hip pain with recent exacerbation of right hip pain ~1 month ago. Patient recalls participating in exercise class during which she performed squat jumps. Patient reports immediate right lateral hip pain that continued with all ADL's and work related duties which involved driving to the [employer] daily. Pain also hindered sleep. She demonstrates no deficits in ROM, but has notable weakness to B hip girdle strength, right worse than left. LQS WNL, Lumbar screening WNL. Pt  will benefit from physical therapist management to address her impairments (listed below),  educate her, and improve her level of function. Thanks for your referral.    Evaluation Complexity:  History:  HIGH Complexity :3+ comorbidities / personal factors will impact the outcome/ POC ; Examination:  HIGH Complexity : 4+ Standardized tests and measures addressing body structure, function, activity limitation and / or participation in recreation  ;Presentation:  LOW Complexity : Stable, uncomplicated  ;

## 2024-11-15 NOTE — PROGRESS NOTES
PT DAILY TREATMENT NOTE/LUMBAR EVAL    Patient Name: Kelvin Flores    Date: 11/15/2024    : 1985  Insurance: Payor: [facility] MEDICAID / Plan: [facility] COMMUNITY PLAN CARDINAL CARE / Product Type: *No Product type* /      Patient  verified yes     Visit #   Current / Total 1 8   Time   In / Out [id number]   Pain   In / Out 3 3   Subjective Functional Status/Changes: See Below     Treatment Area: Pain in right hip [M25.551]    SUBJECTIVE    []constant [x]intermittent []improving []worsening []no change since onset    Mechanism of Injury: no BERNADINE. Has had a tender left hip since she had her daughter 15 years ago. Right hip has been about 5 years. Major flare up when she was doing jump squats about a month ago during a workout class. Was also driving a lot to the [employer] for work, which she has since quit. Wants to get healthy again before going back to work.     Current symptoms/Complaints: right lateral hip pain that radiates to mid thigh.     PLOF: recreationally active since having gastric sleeve 2 years ago. Lost 107 lbs over the past 2 years.     Limitations to PLOF: self limiting in recreational activities.     What produces or worsens:jumping, sitting, driving, stairs/steps    What stops or reduces: rest    Continued use makes the pain:  [] Better [x]  Worse []  No effect     Pain at rest: [] No    [x] Yes       Previous Treatment/Compliance: has had injections but did not last more than 3-6  months. (2 in each hip).  Had PT 2-3 years ago with success but didn't continue with HEP after 6 months as she felt better.     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     Living Situation/Domestic Life: has stairs at home. Garden, step in tub/shower.     Pt Goals: less pain in hip.     Barriers: []pain []financial []time []transportation []Other    Motivation: high    Substance use: []Alcohol

## 2024-11-25 ENCOUNTER — HOSPITAL ENCOUNTER (OUTPATIENT)
Facility: HOSPITAL | Age: 39
Discharge: HOME | End: 2024-11-28
Payer: MEDICAID

## 2024-11-25 DIAGNOSIS — M25.552 PAIN IN LEFT HIP: ICD-10-CM

## 2024-11-25 PROCEDURE — 73721 MRI JNT OF LWR EXTRE W/O DYE: CPT

## 2024-12-02 ENCOUNTER — APPOINTMENT (OUTPATIENT)
Facility: HOSPITAL | Age: 39
End: 2024-12-02
Payer: MEDICAID

## 2024-12-03 ENCOUNTER — HOSPITAL ENCOUNTER (OUTPATIENT)
Facility: HOSPITAL | Age: 39
Setting detail: RECURRING SERIES
End: 2024-12-03
Payer: MEDICAID

## 2024-12-04 ENCOUNTER — APPOINTMENT (OUTPATIENT)
Facility: HOSPITAL | Age: 39
End: 2024-12-04
Payer: MEDICAID

## 2024-12-09 ENCOUNTER — HOSPITAL ENCOUNTER (OUTPATIENT)
Facility: HOSPITAL | Age: 39
Setting detail: RECURRING SERIES
Discharge: HOME | End: 2024-12-12
Payer: MEDICAID

## 2024-12-09 PROCEDURE — 97535 SELF CARE MNGMENT TRAINING: CPT

## 2024-12-09 PROCEDURE — 97110 THERAPEUTIC EXERCISES: CPT

## 2024-12-09 PROCEDURE — 97116 GAIT TRAINING THERAPY: CPT

## 2024-12-09 PROCEDURE — 97112 NEUROMUSCULAR REEDUCATION: CPT

## 2024-12-09 PROCEDURE — 97530 THERAPEUTIC ACTIVITIES: CPT

## 2024-12-09 NOTE — PROGRESS NOTES
applicable:     42 42 [facility] Totals Reminder: bill using total billable min of TIMED therapeutic procedures (example: do not include dry needle or estim unattended, both untimed codes, in totals to left)  8-22 min = 1 unit; 23-37 min = 2 units; 38-52 min = 3 units; 53-67 min = 4 units; 68-82 min = 5 units   Total Total     [x]  Patient Education billed concurrently with other procedures   [x] Review HEP    [] Progressed/Changed HEP, detail:    [] Other detail:  Pt ed on the benefits and importance of compliance with HEP    Objective Information/Functional Measures/Assessment  Required min/Mod VCs + demo to perform proper technique and for safety with TE  Pt demonstrated minimal apprehension during movements without c/o increase p!    Pt initiated HK/BK/retro gait training for a dynamic warmup  Pt initiated Heel<>toe amb gait training and requires standby guard assist due to walking on the lateral border of the foot and losing balance. Pt requires mod VC s to prevent inversion of the foot.   Pt initiated H/L ABD with RTB and will progress to GTB next visit due to using GTB at home  Pt initiated hip 3-way in min VC s to prevent ER of the hip as well as leaning compensation     Patient will continue to benefit from skilled PT / OT services to modify and progress therapeutic interventions, analyze and address functional mobility deficits, analyze and address ROM deficits, analyze and address strength deficits, analyze and address soft tissue restrictions, analyze and cue for proper movement patterns, analyze and modify for postural abnormalities, analyze and address imbalance/dizziness, and instruct in home and community integration to address functional deficits and attain remaining goals.    Progress toward goals / Updated goals:  []  See Progress Note/Recertification    Short Term Goals: To be accomplished in 2 weeks  Patient to be adherent to HEP to facilitate pain control with ADL's.  -Status at IE- HEP

## 2024-12-10 ENCOUNTER — HOSPITAL ENCOUNTER (OUTPATIENT)
Facility: HOSPITAL | Age: 39
Discharge: HOME | End: 2024-12-13

## 2024-12-11 ENCOUNTER — HOSPITAL ENCOUNTER (OUTPATIENT)
Facility: HOSPITAL | Age: 39
Setting detail: RECURRING SERIES
Discharge: HOME | End: 2024-12-14
Payer: MEDICAID

## 2024-12-11 PROCEDURE — 97112 NEUROMUSCULAR REEDUCATION: CPT

## 2024-12-11 PROCEDURE — 97110 THERAPEUTIC EXERCISES: CPT

## 2024-12-11 PROCEDURE — 97530 THERAPEUTIC ACTIVITIES: CPT

## 2024-12-11 PROCEDURE — 97535 SELF CARE MNGMENT TRAINING: CPT

## 2024-12-11 PROCEDURE — 97116 GAIT TRAINING THERAPY: CPT

## 2024-12-11 NOTE — PROGRESS NOTES
PHYSICAL  DAILY TREATMENT NOTE     Patient Name: Kelvin Flores    Date: 2024    : 1985  Ins[facility]: [facility] / Plan: [facility] PLAN CARDINAL CARE / Product Type: *No Product type* /      Patient  verified Yes     Visit #   Current / Total 2 8   Time   In / Out 10:00 10:42   Pain   In / Out 0/10 0/10   Subjective Functional Status/Changes: Pt reports no soreness from the previous session and that her HEP is going well   Pt reports she feels core activating more with band exercises       Next PN/ RC due 12/15/24  Auth due 25 16V    TREATMENT AREA =  Pain in right hip [M25.551]    OBJECTIVE    Therapeutic Procedures:    Tx Min Billable or 1:1 Min (if diff from Tx Min) Procedure, Rationale, Specifics   10  77600 Therapeutic Exercise (timed):  increase ROM, strength, coordination, balance, and proprioception to improve patient's ability to progress to PLOF and address remaining functional goals. (see flow sheet as applicable)     Details if applicable:       10  76331 Neuromuscular Re-Education (timed):  improve balance, coordination, kinesthetic sense, posture, core stability and proprioception to improve patient's ability to develop conscious control of individual muscles and awareness of position of extremities in order to progress to PLOF and address remaining functional goals. (see flow sheet as applicable)     Details if applicable:   10  42192 Gait Training (timed):   (I) for HK/butt kicks/retro amb x 60'  (I) for heel/toe amb x 30'      . Cuing for to increase PERI, and core and upright posture   .  To improve safety and dynamic movement with household/community ambulation.  (see flow sheet as applicable)      12  67845 Therapeutic Activity (timed):  use of dynamic activities replicating functional movements to increase ROM, strength, coordination, balance, and proprioception in order to improve patient's ability to progress to PLOF and address remaining functional

## 2024-12-16 ENCOUNTER — HOSPITAL ENCOUNTER (OUTPATIENT)
Facility: HOSPITAL | Age: 39
Setting detail: RECURRING SERIES
Discharge: HOME | End: 2024-12-19
Payer: MEDICAID

## 2024-12-16 PROCEDURE — 97530 THERAPEUTIC ACTIVITIES: CPT

## 2024-12-16 PROCEDURE — 97110 THERAPEUTIC EXERCISES: CPT

## 2024-12-16 PROCEDURE — 97535 SELF CARE MNGMENT TRAINING: CPT

## 2024-12-16 PROCEDURE — 97112 NEUROMUSCULAR REEDUCATION: CPT

## 2024-12-16 NOTE — PROGRESS NOTES
[facility] AT \[facility]\""  [address] [place] [address]   Phone: [phone] Fax: [phone]  PROGRESS NOTE  Patient Name: Kelvin Flores : 1985   Treatment/Medical Diagnosis: Pain in right hip [M25.551] Pain in right hip [M25.551]   Referral Source: Jayce Lui PA-C     Date of Initial Visit: 11/15/24 Attended Visits: 4 Missed Visits: 0     SUMMARY OF TREATMENT  Patient's POC has consisted of therex, therapeutic activities, manual therapy prn, modalities prn, pt. education, and a comprehensive HEP. Treatment strategies used to address functional mobility deficits, ROM deficits, strength deficits, analyze and address soft tissue restrictions, analyze and cue movement patterns, analyze and modify body mechanics/ergonomics, assess and modify postural abnormalities and instruct in home and community integration.    CURRENT STATUS  Kelvin Flores is progressing towards goals in PT by progressing towards (I) with HEP,  and improving functional mobility. Pt reports overall better since SOC, and able to manage sxs with HEP. Pt was able to abolish Right LE radic sxs with repeated/prolong L/s movement (MDT/Irvin Method). Pt is (I) with transfers and bed mobility. Pt continues to have p! In different positions in bed, and with prolong standing. Pt (I) with gait training with no limitations from hip.    Short Term Goals: To be accomplished in 2 weeks  Patient to be adherent to HEP to facilitate pain control with ADL's. MET  -Status at IE- HEP initiated.   Current: compliance HEP  2.   Patient to report > 70% improvement in sleep interrupted by right hip pain. progressing  -Status at IE- disturbed sleep due to right hip pain   Current: p! free today    Long Term Goals: To be accomplished in 4 weeks  Patient to be Safe and Independent with HEP to self-manage/prevent symptoms after DC.  Patient to improve LEFS score to 66 indicate improved 
today  Long Term Goals: To be accomplished in 4 weeks  Patient to be Safe and Independent with HEP to self-manage/prevent symptoms after DC.  Patient to improve LEFS score to 66 indicate improved functional status and independence.  -Status at IE- LEFS score = 57  3.  Patient to demonstrate safe stair negotiation with < 1UE assist to facilitate greater independence in community mobility and safety. Will initiated stair training NV  -Status at IE- intolerant towards stair negotiation due to right hip pain.   4.  Patient to demonstrate proper squat mechanics to parallel without exacerbation of symptoms x10 reps to promote tolerance towards return to recreational exercise.     PLAN  Yes  Continue plan of care  [x]  Upgrade activities as tolerated  []  Discharge due to :  []  Other:    Elvia Craig PTA    12/16/2024    10:17 AM    If an interpreting service was utilized for treatment of this patient, the contents of this document represent the material reviewed with the patient via the .    Future Appointments   Date Time Provider Department Center   12/18/2024 10:00 AM Elvia Craig, PTA [facility]   12/23/2024 10:00 AM Aznar, Tone, PT [facility]   12/27/2024 10:00 AM Aznar, Tone, PT [facility]   12/30/2024  9:20 AM Henkatherine, Elvia, PTA [facility]   1/2/2025  7:20 AM Henkatherine, Elvia, PTA [facility]   1/6/2025  8:40 AM Aznar, Tone, PT [facility]   1/8/2025  8:00 AM Henkatherine, Elvia, PTA [facility]   1/13/2025  8:00 AM Aznar, Tone, PT [facility]   1/15/2025  8:00 AM Henline, Elvia, PTA [facility]   1/20/2025  8:00 AM Aznar, Tone, PT [facility]   1/22/2025  8:00 AM Aznar, Tone, PT [facility]   1/27/2025  8:00 AM Aznar, Tone, PT [facility]   1/29/2025  8:00 AM Henkatherine, Elvia, PTA [facility]   3/11/2025  2:30 PM [facility]   4/7/2025  9:50 AM Fatuma Arellano APRN - NP [facility]

## 2024-12-18 ENCOUNTER — HOSPITAL ENCOUNTER (OUTPATIENT)
Facility: HOSPITAL | Age: 39
Setting detail: RECURRING SERIES
Discharge: HOME | End: 2024-12-21
Payer: MEDICAID

## 2024-12-18 PROCEDURE — 97530 THERAPEUTIC ACTIVITIES: CPT

## 2024-12-18 PROCEDURE — 97110 THERAPEUTIC EXERCISES: CPT

## 2024-12-18 PROCEDURE — 97112 NEUROMUSCULAR REEDUCATION: CPT

## 2024-12-18 PROCEDURE — 97116 GAIT TRAINING THERAPY: CPT

## 2024-12-18 PROCEDURE — 97535 SELF CARE MNGMENT TRAINING: CPT

## 2024-12-18 NOTE — PROGRESS NOTES
address remaining functional goals.  (see flow sheet as applicable)      16  63946 Therapeutic Activity (timed):  use of dynamic activities replicating functional movements to increase ROM, strength, coordination, balance, and proprioception in order to improve patient's ability to progress to PLOF and address remaining functional goals.  (see flow sheet as applicable)     Details if applicable:     61 61 [facility] Totals Reminder: bill using total billable min of TIMED therapeutic procedures (example: do not include dry needle or estim unattended, both untimed codes, in totals to left)  8-22 min = 1 unit; 23-37 min = 2 units; 38-52 min = 3 units; 53-67 min = 4 units; 68-82 min = 5 units   Total Total     [x]  Patient Education billed concurrently with other procedures   [x] Review HEP    [] Progressed/Changed HEP, detail:    [] Other detail:  Pt ed on the benefits and importance of compliance with HEP    Objective Information/Functional Measures/Assessment  Required min/Mod VCs + demo to perform proper technique and for safety with TE  Pt demonstrated minimal apprehension during movements without c/o increase p!    demos symmetrical WBIng with sit <>stand without UE asisst    Added 5# KB to GT   (I) with GT    Increased/ advanced therex per flow sheet with no c/o p!    Patient will continue to benefit from skilled PT / OT services to modify and progress therapeutic interventions, analyze and address functional mobility deficits, analyze and address ROM deficits, analyze and address strength deficits, analyze and address soft tissue restrictions, analyze and cue for proper movement patterns, analyze and modify for postural abnormalities, analyze and address imbalance/dizziness, and instruct in home and community integration to address functional deficits and attain remaining goals.    Progress toward goals / Updated goals:  []  See Progress Note/Recertification  See PN last visit, no changes noted at this

## 2024-12-23 ENCOUNTER — HOSPITAL ENCOUNTER (OUTPATIENT)
Facility: HOSPITAL | Age: 39
Setting detail: RECURRING SERIES
Discharge: HOME | End: 2024-12-26
Payer: MEDICAID

## 2024-12-23 PROCEDURE — 97110 THERAPEUTIC EXERCISES: CPT

## 2024-12-23 PROCEDURE — 97112 NEUROMUSCULAR REEDUCATION: CPT

## 2024-12-23 PROCEDURE — 97530 THERAPEUTIC ACTIVITIES: CPT

## 2024-12-23 NOTE — PROGRESS NOTES
PHYSICAL / OCCUPATIONAL THERAPY - DAILY TREATMENT NOTE     Patient Name: Kelvin Flores    Date: 2024    : 1985  Insurance: Payor: [facility] MEDICAID / Plan: [facility] CARDINAL CARE / Product Type: *No Product type* /      Patient  verified yes     Visit #   Current / Total 6 8-12   Time   In / Out 10:00 10:41   Pain   In / Out 1 right lateral thigh 0   Subjective Functional Status/Changes: Pt reports she is going to do some baking today and knows she will have to sit for some of it (Because of right hip pain).     Next PN/ RC due 25  Auth due 25 16V    TREATMENT AREA =  Pain in right hip [M25.551]    OBJECTIVE    Therapeutic Procedures:  Tx Min Billable or 1:1 Min (if diff from Tx Min) Procedure, Rationale, Specifics   10  49914 Therapeutic Exercise (timed):  increase ROM, strength, coordination, balance, and proprioception to improve patient's ability to progress to PLOF and address remaining functional goals. (see flow sheet as applicable)     Details if applicable:       15  33776 Therapeutic Activity (timed):  use of dynamic activities replicating functional movements to increase ROM, strength, coordination, balance, and proprioception in order to improve patient's ability to progress to PLOF and address remaining functional goals.  (see flow sheet as applicable)     Details if applicable:     16  31186 Neuromuscular Re-Education (timed):  improve balance, coordination, kinesthetic sense, posture, core stability and proprioception to improve patient's ability to develop conscious control of individual muscles and awareness of position of extremities in order to progress to PLOF and address remaining functional goals. (see flow sheet as applicable)     Details if applicable:     41  [facility] Totals Reminder: bill using total billable min of TIMED therapeutic procedures (example: do not include dry needle or estim unattended, both untimed codes, in totals to left)  8-22 min

## 2024-12-27 ENCOUNTER — HOSPITAL ENCOUNTER (OUTPATIENT)
Facility: HOSPITAL | Age: 39
Setting detail: RECURRING SERIES
Discharge: HOME | End: 2024-12-30
Payer: MEDICAID

## 2024-12-27 PROCEDURE — 97112 NEUROMUSCULAR REEDUCATION: CPT

## 2024-12-27 PROCEDURE — 97110 THERAPEUTIC EXERCISES: CPT

## 2024-12-27 PROCEDURE — 97116 GAIT TRAINING THERAPY: CPT

## 2024-12-27 PROCEDURE — 97530 THERAPEUTIC ACTIVITIES: CPT

## 2024-12-27 PROCEDURE — 97535 SELF CARE MNGMENT TRAINING: CPT

## 2024-12-27 NOTE — PROGRESS NOTES
flow sheet as applicable)      10  53377 Therapeutic Activity (timed):  use of dynamic activities replicating functional movements to increase ROM, strength, coordination, balance, and proprioception in order to improve patient's ability to progress to PLOF and address remaining functional goals.  (see flow sheet as applicable)     Details if applicable:     42 42 [facility] Totals Reminder: bill using total billable min of TIMED therapeutic procedures (example: do not include dry needle or estim unattended, both untimed codes, in totals to left)  8-22 min = 1 unit; 23-37 min = 2 units; 38-52 min = 3 units; 53-67 min = 4 units; 68-82 min = 5 units   Total Total     [x]  Patient Education billed concurrently with other procedures   [x] Review HEP    [] Progressed/Changed HEP, detail:    [] Other detail:  Pt ed on the benefits and importance of compliance with HEP    Objective Information/Functional Measures/Assessment  Required min VCs + demo to perform proper technique and for safety with TE  Pt demonstrated minimal apprehension during movements without c/o increase p!    Added AE to hip 3 way    C/o LLE fatigue with SL Wbing during hip 3 way on AE    Performed seated piriformis str with yoga block     Increased/ advanced therex per flow sheet with no c/o p!    Patient will continue to benefit from skilled PT / OT services to modify and progress therapeutic interventions, analyze and address functional mobility deficits, analyze and address ROM deficits, analyze and address strength deficits, analyze and address soft tissue restrictions, analyze and cue for proper movement patterns, analyze and modify for postural abnormalities, analyze and address imbalance/dizziness, and instruct in home and community integration to address functional deficits and attain remaining goals.    Progress toward goals / Updated goals:  []  See Progress Note/Recertification  Long Term Goals: To be accomplished in 4 weeks  Patient to be Safe

## 2024-12-30 ENCOUNTER — HOSPITAL ENCOUNTER (OUTPATIENT)
Facility: HOSPITAL | Age: 39
Setting detail: RECURRING SERIES
Discharge: HOME | End: 2025-01-02
Payer: MEDICAID

## 2024-12-30 PROCEDURE — 97110 THERAPEUTIC EXERCISES: CPT

## 2024-12-30 PROCEDURE — 97112 NEUROMUSCULAR REEDUCATION: CPT

## 2024-12-30 PROCEDURE — 97530 THERAPEUTIC ACTIVITIES: CPT

## 2024-12-30 PROCEDURE — 97116 GAIT TRAINING THERAPY: CPT

## 2024-12-30 PROCEDURE — 97535 SELF CARE MNGMENT TRAINING: CPT

## 2024-12-30 NOTE — PROGRESS NOTES
PHYSICAL  DAILY TREATMENT NOTE     Patient Name: Kelvin Flores    Date: 2024    : 1985  Insurance: Payor: [facility] MEDICAID / Plan: [facility] PLAN CARDINAL CARE / Product Type: *No Product type* /      Patient  verified Yes     Visit #   Current / Total 8 16   Time   In / Out 9:20 10:17   Pain   In / Out 0/10 0/10   Subjective Functional Status/Changes: Pt reports some soreness after last visit but not today. Pt continues to be compliant with HEP        Next PN/ RC due 25  Auth due 25 16V    TREATMENT AREA =  Pain in right hip [M25.551]    OBJECTIVE    Therapeutic Procedures:    Tx Min Billable or 1:1 Min (if diff from Tx Min) Procedure, Rationale, Specifics   19  18396 Therapeutic Exercise (timed):  increase ROM, strength, coordination, balance, and proprioception to improve patient's ability to progress to PLOF and address remaining functional goals. (see flow sheet as applicable)     Details if applicable:       8  54674 Neuromuscular Re-Education (timed):  improve balance, coordination, kinesthetic sense, posture, core stability and proprioception to improve patient's ability to develop conscious control of individual muscles and awareness of position of extremities in order to progress to PLOF and address remaining functional goals. (see flow sheet as applicable)     Details if applicable:   10  62688 Gait Training (timed):   (I) for HK+  suitcase carry x 60' ea UE with 8# x 2  (I) c butt kicks/retro/SS amb x 60' c 8#  Cuing for core and upright posture   To improve safety and dynamic movement with household/community ambulation.  (see flow sheet as applicable)      8  29636 Self Care/Home Management (timed):  improve patient knowledge and understanding of home injury/symptom/pain management, positioning, posture/ergonomics, home safety, activity modification, transfer techniques, and joint protection strategies  to improve patient's ability to progress to PLOF and address

## 2025-01-02 ENCOUNTER — CLINICAL DOCUMENTATION (OUTPATIENT)
Facility: HOSPITAL | Age: 40
End: 2025-01-02

## 2025-01-02 ENCOUNTER — HOSPITAL ENCOUNTER (OUTPATIENT)
Facility: HOSPITAL | Age: 40
Setting detail: RECURRING SERIES
Discharge: HOME | End: 2025-01-05
Payer: MEDICAID

## 2025-01-02 PROCEDURE — 97530 THERAPEUTIC ACTIVITIES: CPT

## 2025-01-02 PROCEDURE — 97110 THERAPEUTIC EXERCISES: CPT

## 2025-01-02 PROCEDURE — 97112 NEUROMUSCULAR REEDUCATION: CPT

## 2025-01-02 PROCEDURE — 97535 SELF CARE MNGMENT TRAINING: CPT

## 2025-01-02 PROCEDURE — 97116 GAIT TRAINING THERAPY: CPT

## 2025-01-02 NOTE — PROGRESS NOTES
FILOMENA reached out to patient to discuss previously set SMART goals.  Patient has done well with lifestyle changes.  Future SMART goals are:   Improve steps to 6,000 a day.  TAZO Green Tea.  Weight goal 185#    We are set to talk in the future.  Nadja Olivera RD

## 2025-01-02 NOTE — PROGRESS NOTES
PHYSICAL  DAILY TREATMENT NOTE     Patient Name: Kelvin Flores    Date: 2025    : 1985  Insurance: Payor: [facility] MEDICAID / Plan: [facility] [place] CARDINAL CARE / Product Type: *No Product type* /      Patient  verified Yes     Visit #   Current / Total 9 16   Time   In / Out 3:20 4:05   Pain   In / Out 0/10 0/10   Subjective Functional Status/Changes: Pt reports she feels stronger with all the exercises        Next PN/ RC due 25  Auth due 25 16V    TREATMENT AREA =  Pain in right hip [M25.551]    OBJECTIVE    Therapeutic Procedures:    Tx Min Billable or 1:1 Min (if diff from Tx Min) Procedure, Rationale, Specifics   11  46245 Therapeutic Exercise (timed):  increase ROM, strength, coordination, balance, and proprioception to improve patient's ability to progress to PLOF and address remaining functional goals. (see flow sheet as applicable)     Details if applicable:       8  20814 Neuromuscular Re-Education (timed):  improve balance, coordination, kinesthetic sense, posture, core stability and proprioception to improve patient's ability to develop conscious control of individual muscles and awareness of position of extremities in order to progress to PLOF and address remaining functional goals. (see flow sheet as applicable)     Details if applicable:   10  12301 Gait Training (timed):   (I) for HK+  suitcase carry x 60' ea UE with 10# x 2  (I) c butt kicks/retro/SS amb x 60' c 10#  Cuing for core and upright posture   To improve safety and dynamic movement with household/community ambulation.  (see flow sheet as applicable)      8  86765 Self Care/Home Management (timed):  improve patient knowledge and understanding of home injury/symptom/pain management, positioning, posture/ergonomics, home safety, activity modification, transfer techniques, and joint protection strategies  to improve patient's ability to progress to PLOF and address remaining functional goals.  (see flow

## 2025-01-06 ENCOUNTER — HOSPITAL ENCOUNTER (OUTPATIENT)
Facility: HOSPITAL | Age: 40
Setting detail: RECURRING SERIES
Discharge: HOME | End: 2025-01-09
Payer: MEDICAID

## 2025-01-06 PROCEDURE — 97530 THERAPEUTIC ACTIVITIES: CPT

## 2025-01-06 PROCEDURE — 97112 NEUROMUSCULAR REEDUCATION: CPT

## 2025-01-06 PROCEDURE — 97535 SELF CARE MNGMENT TRAINING: CPT

## 2025-01-06 PROCEDURE — 97110 THERAPEUTIC EXERCISES: CPT

## 2025-01-06 PROCEDURE — 97116 GAIT TRAINING THERAPY: CPT

## 2025-01-06 NOTE — PROGRESS NOTES
PHYSICAL THERAPY - DAILY TREATMENT NOTE     Patient Name: Kelvin Flores    Date: 2025    : 1985  Insurance: Payor:  MEDICAID / Plan: [facility] PLAN CARDINAL CARE / Product Type: *No Product type* /      Patient  verified yes     Visit #   Current / Total 10 8-16   Time   In / Out [phone]   Pain   In / Out 2 0   Subjective Functional Status/Changes: It's a little irritated this morning, but I think it's just because of how I slept.   My dad cut me a 2x4 to be able to stand on when I do my Hip 3-way at home. He didn't want me to spend money on a foam lol.      TREATMENT AREA =  Pain in right hip [M25.551]    Next PN/ RC due 25  Auth due 25 16V    OBJECTIVE    Therapeutic Procedures:  Tx Min Billable or 1:1 Min (if diff from Tx Min) Procedure, Rationale, Specifics   12  22158 Therapeutic Exercise (timed):  increase ROM, strength, coordination, balance, and proprioception to improve patient's ability to progress to PLOF and address remaining functional goals. (see flow sheet as applicable)     Details if applicable:       12  80660 Therapeutic Activity (timed):  use of dynamic activities replicating functional movements to increase ROM, strength, coordination, balance, and proprioception in order to improve patient's ability to progress to PLOF and address remaining functional goals.  (see flow sheet as applicable)     Details if applicable:     8  73178 Neuromuscular Re-Education (timed):  improve balance, coordination, kinesthetic sense, posture, core stability and proprioception to improve patient's ability to develop conscious control of individual muscles and awareness of position of extremities in order to progress to PLOF and address remaining functional goals. (see flow sheet as applicable)     Details if applicable:     8  66460 Gait Training (timed):    200 feet over even surfaces with supervision level of assist. Cuing for posture.  To improve safety and dynamic

## 2025-01-08 ENCOUNTER — HOSPITAL ENCOUNTER (OUTPATIENT)
Facility: HOSPITAL | Age: 40
Setting detail: RECURRING SERIES
Discharge: HOME | End: 2025-01-11
Payer: MEDICAID

## 2025-01-08 PROCEDURE — 97110 THERAPEUTIC EXERCISES: CPT

## 2025-01-08 PROCEDURE — 97112 NEUROMUSCULAR REEDUCATION: CPT

## 2025-01-08 PROCEDURE — 97116 GAIT TRAINING THERAPY: CPT

## 2025-01-08 PROCEDURE — 97530 THERAPEUTIC ACTIVITIES: CPT

## 2025-01-13 ENCOUNTER — HOSPITAL ENCOUNTER (OUTPATIENT)
Facility: HOSPITAL | Age: 40
Setting detail: RECURRING SERIES
Discharge: HOME | End: 2025-01-16
Payer: MEDICAID

## 2025-01-13 PROCEDURE — 97110 THERAPEUTIC EXERCISES: CPT

## 2025-01-13 PROCEDURE — 97112 NEUROMUSCULAR REEDUCATION: CPT

## 2025-01-13 PROCEDURE — 97116 GAIT TRAINING THERAPY: CPT

## 2025-01-13 PROCEDURE — 97530 THERAPEUTIC ACTIVITIES: CPT

## 2025-01-13 NOTE — PROGRESS NOTES
unattended, both untimed codes, in totals to left)  8-22 min = 1 unit; 23-37 min = 2 units; 38-52 min = 3 units; 53-67 min = 4 units; 68-82 min = 5 units   Total Total       [x]  Patient Education billed concurrently with other procedures   [x] Review HEP    [] Progressed/Changed HEP  [] Other:    Objective Information/Functional Measures/Assessment    Increased reps/sets/resistance per flow sheet.  Added rebounder for dynamic SLS balance    Patient will continue to benefit from skilled PT services to modify and progress therapeutic interventions, analyze and address functional mobility deficits, analyze and address ROM deficits, analyze and address strength deficits, analyze and address soft tissue restrictions, analyze and cue for proper movement patterns, and instruct in home and community integration to address functional deficits and attain remaining goals.    Progress toward goals / Updated goals:  []  See Progress Note/Recertification    Performed/participated in treatment well without complaints aside from muscular fatigue/deconditioning, indicating (+) response to current course of treatment to further improve functional status.       PLAN  yes Continue plan of care  []  Upgrade activities as tolerated  []  Discharge due to :  []  Other:    Tone \"BJ\" DONTAE EucedaT, Cert. MDT, Cert. DN, Cert. SMT, Dip. Osteopractic    1/13/2025    8:25 AM  If an interpreting service was utilized for treatment of this patient, the contents of this document represent the material reviewed with the patient via the .    Future Appointments   Date Time Provider Department Center   1/15/2025  8:00 AM Elvia Craig PTA [facility]   1/20/2025  8:00 AM [facility] PT [address] [facility]   1/22/2025  8:00 AM Tone Euceda, PT [facility]   1/27/2025  8:00 AM Tone Euceda, PT [facility]   1/29/2025  8:00 AM Elvia Craig PTA [facility]   3/11/2025  2:30 PM [facility] DIETITIAN KARYN PERES [facility]   4/7/2025  9:50 AM Adan

## 2025-01-15 ENCOUNTER — HOSPITAL ENCOUNTER (OUTPATIENT)
Facility: HOSPITAL | Age: 40
Setting detail: RECURRING SERIES
Discharge: HOME | End: 2025-01-18
Payer: MEDICAID

## 2025-01-15 PROCEDURE — 97110 THERAPEUTIC EXERCISES: CPT

## 2025-01-15 PROCEDURE — 97116 GAIT TRAINING THERAPY: CPT

## 2025-01-15 PROCEDURE — 97112 NEUROMUSCULAR REEDUCATION: CPT

## 2025-01-15 PROCEDURE — 97530 THERAPEUTIC ACTIVITIES: CPT

## 2025-01-15 NOTE — PROGRESS NOTES
PHYSICAL THERAPY - DAILY TREATMENT NOTE (updated )    Patient Name: Kelvin Flores    Date: 1/15/2025    : 1985  Insurance: Payor: [facility] MEDICAID / Plan: [facility] CARDINAL CARE / Product Type: *No Product type* /      Patient  verified yes     Visit #   Current / Total 13 16   Time   In / Out 8:03 8:41   Pain   In / Out 3/10 3/10   Subjective Functional Status/Changes: Patient reports a 50% improvement in right hip pain since beginning therapy. States she has been using a cushion under her left LE when driving which has eliminated right hip pain when driving. States she still has stiffness when getting out of the car. She  reports an 80% improvement in sleep tolerance since beginning therapy.      TREATMENT AREA =  Pain in right hip [M25.551]    Next PN/ RC due 25  Auth due 25 16V    OBJECTIVE    Therapeutic Procedures:  Tx Min Billable or 1:1 Min (if diff from Tx Min) Procedure, Rationale, Specifics   12  18077 Therapeutic Exercise (timed):  increase ROM, strength, coordination, balance, and proprioception to improve patient's ability to progress to PLOF and address remaining functional goals. (see flow sheet as applicable)     Details if applicable:  MMT     10  06098 Therapeutic Activity (timed):  use of dynamic activities replicating functional movements to increase ROM, strength, coordination, balance, and proprioception in order to improve patient's ability to progress to PLOF and address remaining functional goals.  (see flow sheet as applicable)     Details if applicable:   squats   8  58479 Neuromuscular Re-Education (timed):  improve balance, coordination, kinesthetic sense, posture, core stability and proprioception to improve patient's ability to develop conscious control of individual muscles and awareness of position of extremities in order to progress to PLOF and address remaining functional goals. (see flow sheet as applicable)     Details if applicable:

## 2025-01-15 NOTE — PROGRESS NOTES
[facility] AT \[facility]\""  [address] [place] [address]   Phone: [phone] Fax: [phone]  PROGRESS NOTE  Patient Name: Kelvin Flores : 1985   Treatment/Medical Diagnosis: Pain in right hip [M25.551]   Referral Source: Jayce Lui PA-C     Date of Initial Visit: 11/15/24 Attended Visits: 13 Missed Visits: 0     SUMMARY OF TREATMENT  Treatment has consisted of stretching and strengthening exercises for B LE's, dynamic gait exercises, balance training, patient education, and home exercise program.   CURRENT STATUS  Patient is progressing with physical therapy. She reports an overall improvement of 50% in right hip pain since beginning therapy.  She reports an 80% improvement in sleeping tolerance due to right hip pain since beginning therapy. Her strength has improved but she requires verbal cues for correct form with squats.    Strength    L(0-5) R (0-5) N/T   Hip Flexion (L1,2) 5 4 (p! 4/10) []    Knee Extension (L3,4) 5 5 []    Ankle Dorsiflexion (L4)     []    Great Toe Extension (L5)     []    Ankle Plantarflexion (S1)     []    Knee Flexion (S1,2) 5 5 []    Hip IR 5 4- []    Hip ER 5 5 []    Paraspinals     []    Gluteus Medius 5 4 []    Gluteus Stefano 4 4 []    Other         []     Stacey test: (-) on right  Ely test: (-) B  Stair tolerance: negotiates 3 steps in clinic with reciprocal pattern and no UE support - reports no pain   Performs 10 squats with no reports of right hip pain but demonstrates anterior translation toward end of set.     Progress to Goals:     Short Term Goals: To be accomplished in 2 weeks  Patient to be adherent to HEP to facilitate pain control with ADL's.  -Status at IE- HEP initiated.  Current status: compliant with HEP Progressing  2.   Patient to report > 70% improvement in sleep interrupted by right hip pain.  -Status at IE- disturbed sleep due to right hip pain  Current status: reports an 80%

## 2025-01-20 ENCOUNTER — APPOINTMENT (OUTPATIENT)
Facility: HOSPITAL | Age: 40
End: 2025-01-20
Payer: MEDICAID

## 2025-01-22 ENCOUNTER — APPOINTMENT (OUTPATIENT)
Facility: HOSPITAL | Age: 40
End: 2025-01-22
Payer: MEDICAID

## 2025-01-24 ENCOUNTER — HOSPITAL ENCOUNTER (OUTPATIENT)
Facility: HOSPITAL | Age: 40
Setting detail: RECURRING SERIES
Discharge: HOME | End: 2025-01-27
Payer: MEDICAID

## 2025-01-24 PROCEDURE — 97110 THERAPEUTIC EXERCISES: CPT

## 2025-01-24 PROCEDURE — 97112 NEUROMUSCULAR REEDUCATION: CPT

## 2025-01-24 PROCEDURE — 97530 THERAPEUTIC ACTIVITIES: CPT

## 2025-01-24 PROCEDURE — 97116 GAIT TRAINING THERAPY: CPT

## 2025-01-24 NOTE — PROGRESS NOTES
PHYSICAL / OCCUPATIONAL THERAPY - DAILY TREATMENT NOTE (updated )    Patient Name: Kelvin Flores    Date: 2025    : 1985  Insurance: Payor: [facility] MEDICAID / Plan: [facility] [employer] [facility] CARDINAL CARE / Product Type: *No Product type* /      Patient  verified Yes     Visit #   Current / Total 14 25   Time   In / Out 9:21 10:06   Pain   In / Out 0/10 0/10   Subjective Functional Status/Changes: Pt reports compliance with HEP.     Next PN/ RC due 2/15/2025  Auth due Pending    TREATMENT AREA =  Pain in right hip [M25.551]    OBJECTIVE    Therapeutic Procedures:    Tx Min Billable or 1:1 Min (if diff from Tx Min) Procedure, Rationale, Specifics   15  84065 Therapeutic Exercise (timed):  increase ROM, strength, coordination, balance, and proprioception to improve patient's ability to progress to PLOF and address remaining functional goals. (see flow sheet as applicable)     Details if applicable:  Nustep, clam/reverse clam, windshield wipers, piriformis stretch     10  89491 Neuromuscular Re-Education (timed):  improve balance, coordination, kinesthetic sense, posture, core stability and proprioception to improve patient's ability to develop conscious control of individual muscles and awareness of position of extremities in order to progress to PLOF and address remaining functional goals. (see flow sheet as applicable)     Details if applicable:  Stand hip 3-way on AE, SLS, rebounder   10  99042 Therapeutic Activity (timed):  use of dynamic activities replicating functional movements to increase ROM, strength, coordination, balance, and proprioception in order to improve patient's ability to progress to PLOF and address remaining functional goals.  (see flow sheet as applicable)     Details if applicable:  Step-ups, sit to stand, mini-squats, bridge   10  33776 Gait Training (timed):   to improve safety and dynamic movement with household/community ambulation.  (see flow sheet as

## 2025-01-27 ENCOUNTER — HOSPITAL ENCOUNTER (OUTPATIENT)
Facility: HOSPITAL | Age: 40
Setting detail: RECURRING SERIES
Discharge: HOME | End: 2025-01-30
Payer: MEDICAID

## 2025-01-27 PROCEDURE — 97110 THERAPEUTIC EXERCISES: CPT

## 2025-01-27 PROCEDURE — 97530 THERAPEUTIC ACTIVITIES: CPT

## 2025-01-27 PROCEDURE — 97112 NEUROMUSCULAR REEDUCATION: CPT

## 2025-01-27 PROCEDURE — 97116 GAIT TRAINING THERAPY: CPT

## 2025-01-27 NOTE — PROGRESS NOTES
of TIMED therapeutic procedures (example: do not include dry needle or estim unattended, both untimed codes, in totals to left)  8-22 min = 1 unit; 23-37 min = 2 units; 38-52 min = 3 units; 53-67 min = 4 units; 68-82 min = 5 units   Total       [x]  Patient Education billed concurrently with other procedures   [x] Review HEP    [] Progressed/Changed HEP  [] Other:    Objective Information/Functional Measures/Assessment    Resumed loaded carries without complaints of pain    Patient will continue to benefit from skilled PT services to modify and progress therapeutic interventions, analyze and address functional mobility deficits, analyze and address ROM deficits, analyze and address strength deficits, analyze and address soft tissue restrictions, analyze and cue for proper movement patterns, and instruct in home and community integration to address functional deficits and attain remaining goals.    Progress toward goals / Updated goals:  []  See Progress Note/Recertification    Performed/participated in treatment well without complaints aside from muscular fatigue/deconditioning, indicating (+) response to current course of treatment to further improve functional status.       PLAN  yes Continue plan of care  []  Upgrade activities as tolerated  []  Discharge due to :  []  Other:    Tone \"BJ\" Ok, JENNIFER, Cert. MDT, Cert. DN, Cert. SMT, Dip. Osteopractic    1/27/2025    8:26 AM  If an interpreting service was utilized for treatment of this patient, the contents of this document represent the material reviewed with the patient via the .    Future Appointments   Date Time Provider Department Center   1/29/2025  8:00 AM Elvia Craig PTA [facility]   2/4/2025  8:40 AM Kenia Hopper PT [facility]   2/6/2025  7:20 AM Elvia Craig PTA [facility]   2/10/2025  8:00 AM Tone Euceda, PT [facility]   2/12/2025  8:00 AM Tone Euceda, PT [facility]   2/18/2025  8:00 AM Kenia Hopper PT [facility]

## 2025-01-29 ENCOUNTER — HOSPITAL ENCOUNTER (OUTPATIENT)
Facility: HOSPITAL | Age: 40
Setting detail: RECURRING SERIES
Discharge: HOME | End: 2025-02-01
Payer: MEDICAID

## 2025-01-29 PROCEDURE — 97110 THERAPEUTIC EXERCISES: CPT

## 2025-01-29 PROCEDURE — 97530 THERAPEUTIC ACTIVITIES: CPT

## 2025-01-29 PROCEDURE — 97116 GAIT TRAINING THERAPY: CPT

## 2025-01-29 PROCEDURE — 97535 SELF CARE MNGMENT TRAINING: CPT

## 2025-01-29 PROCEDURE — 97112 NEUROMUSCULAR REEDUCATION: CPT

## 2025-01-29 NOTE — PROGRESS NOTES
PHYSICAL  DAILY TREATMENT NOTE     Patient Name: Kelvin Flores    Date: 2025    : 1985  Insurance: Payor: [facility] MEDICAID / Plan: [facility] PLAN CARDINAL CARE / Product Type: *No Product type* /      Patient  verified Yes     Visit #   Current / Total 16 25   Time   In / Out 804 854   Pain   In / Out 010 0/10   Subjective Functional Status/Changes: Pt reports she has been keeping up with HEP and feels stronger       Next PN/ RC due 2/15/25  Auth due 16V FROM 25-3/28/25    TREATMENT AREA =  Pain in right hip [M25.551]    OBJECTIVE    Therapeutic Procedures:    Tx Min Billable or 1:1 Min (if diff from Tx Min) Procedure, Rationale, Specifics   14  07945 Therapeutic Exercise (timed):  increase ROM, strength, coordination, balance, and proprioception to improve patient's ability to progress to PLOF and address remaining functional goals. (see flow sheet as applicable)     Details if applicable:       8  51155 Neuromuscular Re-Education (timed):  improve balance, coordination, kinesthetic sense, posture, core stability and proprioception to improve patient's ability to develop conscious control of individual muscles and awareness of position of extremities in order to progress to PLOF and address remaining functional goals. (see flow sheet as applicable)     Details if applicable:   12  53466 Gait Training (timed):   (I) for HK+  suitcase carry x 60' ea UE with 15# x 2  (I) c butt kicks/retro/SS amb x 60' c 15#  (I) with tandem amb x 60'    Cuing for core and upright posture   To improve safety and dynamic movement with household/community ambulation.  (see flow sheet as applicable)      8  34393 Self Care/Home Management (timed):  improve patient knowledge and understanding of home injury/symptom/pain management, positioning, posture/ergonomics, home safety, activity modification, transfer techniques, and joint protection strategies  to improve patient's ability to progress to PLOF

## 2025-02-04 ENCOUNTER — HOSPITAL ENCOUNTER (OUTPATIENT)
Facility: HOSPITAL | Age: 40
Setting detail: RECURRING SERIES
Discharge: HOME | End: 2025-02-07
Payer: MEDICAID

## 2025-02-04 PROCEDURE — 97530 THERAPEUTIC ACTIVITIES: CPT

## 2025-02-04 PROCEDURE — 97116 GAIT TRAINING THERAPY: CPT

## 2025-02-04 PROCEDURE — 97110 THERAPEUTIC EXERCISES: CPT

## 2025-02-04 PROCEDURE — 97112 NEUROMUSCULAR REEDUCATION: CPT

## 2025-02-04 NOTE — PROGRESS NOTES
[place]   2/6/2025  7:20 AM Elvia Craig, PTA [facility]   2/10/2025  8:00 AM Tone Euceda, PT [facility]   2/12/2025  8:00 AM Tone Euceda, PT [facility]   2/18/2025  8:00 AM Kenia Hopper, PT [facility]   2/20/2025  7:20 AM Henkatherine, Elvia, PTA [facility]   2/24/2025  7:20 AM Elvia Craig, PTA [facility]   2/26/2025  8:00 AM HenElvia murphy, PTA [facility]   3/11/2025  2:30 PM TSS [facility] DIETITIAN KARYN [facility]   4/7/2025  9:50 AM Fatuma Arellano APRN - NP [facility]       If an interpreting service was utilized for treatment of this patient, the contents of this document represent the material reviewed with the patient via the .

## 2025-02-06 ENCOUNTER — HOSPITAL ENCOUNTER (OUTPATIENT)
Facility: HOSPITAL | Age: 40
Setting detail: RECURRING SERIES
Discharge: HOME | End: 2025-02-09
Payer: MEDICAID

## 2025-02-06 PROCEDURE — 97110 THERAPEUTIC EXERCISES: CPT

## 2025-02-06 PROCEDURE — 97116 GAIT TRAINING THERAPY: CPT

## 2025-02-06 PROCEDURE — 97530 THERAPEUTIC ACTIVITIES: CPT

## 2025-02-06 PROCEDURE — 97535 SELF CARE MNGMENT TRAINING: CPT

## 2025-02-06 PROCEDURE — 97112 NEUROMUSCULAR REEDUCATION: CPT

## 2025-02-06 NOTE — PROGRESS NOTES
progress to PLOF and address remaining functional goals.  (see flow sheet as applicable)   pt ed on keeping a routine of HEP to maintain gains and strength to promote functional mobility        73 [id number] Therapeutic Activity (timed):  use of dynamic activities replicating functional movements to increase ROM, strength, coordination, balance, and proprioception in order to improve patient's ability to progress to PLOF and address remaining functional goals.  (see flow sheet as applicable)     Details if applicable:     56 56 [facility] Totals Reminder: bill using total billable min of TIMED therapeutic procedures (example: do not include dry needle or estim unattended, both untimed codes, in totals to left)  8-22 min = 1 unit; 23-37 min = 2 units; 38-52 min = 3 units; 53-67 min = 4 units; 68-82 min = 5 units   Total Total     [x]  Patient Education billed concurrently with other procedures   [x] Review HEP    [] Progressed/Changed HEP, detail:    [] Other detail:  Pt ed on the benefits and importance of compliance with HEP    Objective Information/Functional Measures/Assessment  Required min VCs + demo to perform proper technique and for safety with TE  Pt demonstrated minimal apprehension during movements without c/o increase p!      Performed hip 3 way c RTB; required 2'' elevation on standing leg to clear the floor  Vcs to decrease hip ER with standing hip abd    Increased from 10#  to 15# with step ups on 6''; Demos good quad control with instructions to perform slow ecc lowering    Increased/ advanced therex per flow sheet with no c/o p!    Patient will continue to benefit from skilled PT / OT services to modify and progress therapeutic interventions, analyze and address functional mobility deficits, analyze and address ROM deficits, analyze and address strength deficits, analyze and address soft tissue restrictions, analyze and cue for proper movement patterns, analyze and modify for postural abnormalities, analyze

## 2025-02-10 ENCOUNTER — HOSPITAL ENCOUNTER (OUTPATIENT)
Facility: HOSPITAL | Age: 40
Setting detail: RECURRING SERIES
Discharge: HOME | End: 2025-02-13
Payer: MEDICAID

## 2025-02-10 PROCEDURE — 97112 NEUROMUSCULAR REEDUCATION: CPT

## 2025-02-10 PROCEDURE — 97530 THERAPEUTIC ACTIVITIES: CPT

## 2025-02-10 PROCEDURE — 97110 THERAPEUTIC EXERCISES: CPT

## 2025-02-10 PROCEDURE — 97116 GAIT TRAINING THERAPY: CPT

## 2025-02-10 NOTE — PROGRESS NOTES
PHYSICAL THERAPY - DAILY TREATMENT NOTE     Patient Name: Kelvin Flores    Date: 2/10/2025    : 1985  Insurance: Payor: [facility] MEDICAID / Plan: [facility] PLAN CARDINAL CARE / Product Type: *No Product type* /      Patient  verified yes     Visit #   Current / Total 19 25   Time   In / Out 755 839   Pain   In / Out 0 0   Subjective Functional Status/Changes: I went all out for the  weekend and cooked a lot. 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      TREATMENT AREA =  Pain in right hip [M25.551]      Next PN/ RC due 2/15/25  Auth due 16V FROM 25-3/28/25      OBJECTIVE    Therapeutic Procedures:  Tx Min Procedure, Rationale, Specifics   12 31076 Therapeutic Exercise (timed):  increase ROM, strength, coordination, balance, and proprioception to improve patient's ability to progress to PLOF and address remaining functional goals. (see flow sheet as applicable)     Details if applicable:       12 55126 Therapeutic Activity (timed):  use of dynamic activities replicating functional movements to increase ROM, strength, coordination, balance, and proprioception in order to improve patient's ability to progress to PLOF and address remaining functional goals.  (see flow sheet as applicable)     Details if applicable:     10 30050 Neuromuscular Re-Education (timed):  improve balance, coordination, kinesthetic sense, posture, core stability and proprioception to improve patient's ability to develop conscious control of individual muscles and awareness of position of extremities in order to progress to PLOF and address remaining functional goals. (see flow sheet as applicable)     Details if applicable:     10 52129 Gait Training (timed):     To improve safety and dynamic movement with household/community ambulation.  (see flow sheet as applicable)     Details if applicable:  HK+  suitcase carry x 60' ea UE with 15# x 2  (I) c butt kicks/retro/SS amb x 60' c 15#  (I) with tandem amb x 60'   44

## 2025-02-11 ENCOUNTER — TELEPHONE (OUTPATIENT)
Age: 40
End: 2025-02-11

## 2025-02-11 NOTE — TELEPHONE ENCOUNTER
Patient calling about her plastic surgery referral. Would like to know if referral was sent over with her records. Call back number [phone].

## 2025-02-12 ENCOUNTER — HOSPITAL ENCOUNTER (OUTPATIENT)
Facility: HOSPITAL | Age: 40
Setting detail: RECURRING SERIES
Discharge: HOME | End: 2025-02-15
Payer: MEDICAID

## 2025-02-12 PROCEDURE — 97110 THERAPEUTIC EXERCISES: CPT

## 2025-02-12 PROCEDURE — 97530 THERAPEUTIC ACTIVITIES: CPT

## 2025-02-12 PROCEDURE — 97112 NEUROMUSCULAR REEDUCATION: CPT

## 2025-02-12 PROCEDURE — 97116 GAIT TRAINING THERAPY: CPT

## 2025-02-12 NOTE — PROGRESS NOTES
PHYSICAL THERAPY - DAILY TREATMENT NOTE     Patient Name: Kelvin Flores    Date: 2025    : 1985  Insurance: Payor: [facility] MEDICAID / Plan: [facility] PLAN CARDINAL CARE / Product Type: *No Product type* /      Patient  verified yes     Visit #   Current / Total 20 25   Time   In / Out 755 840   Pain   In / Out 0 0   Subjective Functional Status/Changes: This weekend my daughter's [employer] is having a birthday party for little kids, and my daughter is volunteering to provide the cupcakes. I'll be helping her make 48 cupcakes. I plan to help her bake on Friday and just frost them Saturday.      TREATMENT AREA =  Pain in right hip [M25.551]      Next PN/ RC due 2/15/25  Auth due 16V FROM 25-3/28/25      OBJECTIVE    Therapeutic Procedures:  Tx Min Billable or 1:1 Min (if diff from Tx Min) Procedure, Rationale, Specifics   12  69932 Therapeutic Exercise (timed):  increase ROM, strength, coordination, balance, and proprioception to improve patient's ability to progress to PLOF and address remaining functional goals. (see flow sheet as applicable)     Details if applicable:       15  60432 Therapeutic Activity (timed):  use of dynamic activities replicating functional movements to increase ROM, strength, coordination, balance, and proprioception in order to improve patient's ability to progress to PLOF and address remaining functional goals.  (see flow sheet as applicable)     Details if applicable:     10  70875 Neuromuscular Re-Education (timed):  improve balance, coordination, kinesthetic sense, posture, core stability and proprioception to improve patient's ability to develop conscious control of individual muscles and awareness of position of extremities in order to progress to PLOF and address remaining functional goals. (see flow sheet as applicable)     Details if applicable:     8  02301 Gait Training (timed):    To improve safety and dynamic movement with

## 2025-02-18 ENCOUNTER — HOSPITAL ENCOUNTER (OUTPATIENT)
Facility: HOSPITAL | Age: 40
Setting detail: RECURRING SERIES
Discharge: HOME | End: 2025-02-21
Payer: MEDICAID

## 2025-02-18 PROCEDURE — 97535 SELF CARE MNGMENT TRAINING: CPT

## 2025-02-18 PROCEDURE — 97112 NEUROMUSCULAR REEDUCATION: CPT

## 2025-02-18 PROCEDURE — 97116 GAIT TRAINING THERAPY: CPT

## 2025-02-18 PROCEDURE — 97530 THERAPEUTIC ACTIVITIES: CPT

## 2025-02-18 PROCEDURE — 97110 THERAPEUTIC EXERCISES: CPT

## 2025-02-18 NOTE — PROGRESS NOTES
[facility]  [address] [place] [address]   Phone: [phone] Fax: [phone]  PROGRESS NOTE  Patient Name: Kelvin Flores : 1985   Treatment/Medical Diagnosis: Pain in right hip [M25.551]   Referral Source: Jayce Lui PA-C     Date of Initial Visit: 11/15/24 Attended Visits: 13 Missed Visits: 0     SUMMARY OF TREATMENT  Treatment has consisted of stretching and strengthening exercises for B LE's, dynamic gait exercises, balance training, patient education, and home exercise program.   CURRENT STATUS  Patient is progressing with physical therapy. She reports an overall improvement of 50% in right hip pain since beginning therapy.  She reports an 80% improvement in sleeping tolerance due to right hip pain since beginning therapy. Her strength has improved but she requires verbal cues for correct form with squats.    Flex 5/5 B  R  ext  4+/:L4  Abd = 5 B  Ir 4+ R   Pt reports 80% improvement overall since SOC    Strength    L(0-5) R (0-5) N/T   Hip Flexion (L1,2) 5 4 (p! 4/10) []    Knee Extension (L3,4) 5 5 []    Ankle Dorsiflexion (L4)     []    Great Toe Extension (L5)     []    Ankle Plantarflexion (S1)     []    Knee Flexion (S1,2) 5 5 []    Hip IR 5 4- []    Hip ER 5 5 []    Paraspinals     []    Gluteus Medius 5 4 []    Gluteus Stefano 4 4 []    Other         []     Stacey test: (-) on right  Ely test: (-) B  Stair tolerance: negotiates 3 steps in clinic with reciprocal pattern and no UE support - reports no pain   Performs 10 squats with no reports of right hip pain but demonstrates anterior translation toward end of set.     Progress to Goals:     Short Term Goals: To be accomplished in 2 weeks  Patient to be adherent to HEP to facilitate pain control with ADL's.  -Status at - HEP initiated.  Current status: compliant with HEP Progressing  2.   Patient to report > 70% improvement in sleep interrupted by right hip 
remaining functional goals.  (see flow sheet as applicable)   pt ed on importance of balance        13  40556 Therapeutic Activity (timed):  use of dynamic activities replicating functional movements to increase ROM, strength, coordination, balance, and proprioception in order to improve patient's ability to progress to PLOF and address remaining functional goals.  (see flow sheet as applicable)     Details if applicable:     51 51 [facility] Totals Reminder: bill using total billable min of TIMED therapeutic procedures (example: do not include dry needle or estim unattended, both untimed codes, in totals to left)  8-22 min = 1 unit; 23-37 min = 2 units; 38-52 min = 3 units; 53-67 min = 4 units; 68-82 min = 5 units   Total Total     [x]  Patient Education billed concurrently with other procedures   [x] Review HEP    [] Progressed/Changed HEP, detail:    [] Other detail:  Pt ed on the benefits and importance of compliance with HEP    Objective Information/Functional Measures/Assessment  Required min VCs + demo to perform proper technique and for safety with TE  Pt demonstrated minimal apprehension during movements without c/o increase p!      See PN    discussed D/C with HEP NV    SLS     Patient will continue to benefit from skilled PT / OT services to modify and progress therapeutic interventions, analyze and address functional mobility deficits, analyze and address ROM deficits, analyze and address strength deficits, analyze and address soft tissue restrictions, analyze and cue for proper movement patterns, analyze and modify for postural abnormalities, analyze and address imbalance/dizziness, and instruct in home and community integration to address functional deficits and attain remaining goals.    Progress toward goals / Updated goals:  [x]  See Progress Note/Recertification     New Goals to be achieved in __2-4__ WEEKS  1. Patient to be Safe and Independent with HEP to self-manage/prevent symptoms after DC.   2.

## 2025-02-20 ENCOUNTER — APPOINTMENT (OUTPATIENT)
Facility: HOSPITAL | Age: 40
End: 2025-02-20
Payer: MEDICAID

## 2025-02-21 ENCOUNTER — HOSPITAL ENCOUNTER (OUTPATIENT)
Facility: HOSPITAL | Age: 40
Setting detail: RECURRING SERIES
Discharge: HOME | End: 2025-02-24
Payer: MEDICAID

## 2025-02-21 PROCEDURE — 97116 GAIT TRAINING THERAPY: CPT

## 2025-02-21 PROCEDURE — 97530 THERAPEUTIC ACTIVITIES: CPT

## 2025-02-21 PROCEDURE — 97110 THERAPEUTIC EXERCISES: CPT

## 2025-02-21 PROCEDURE — 97112 NEUROMUSCULAR REEDUCATION: CPT

## 2025-02-21 NOTE — PROGRESS NOTES
PHYSICAL THERAPY - DAILY TREATMENT NOTE     Patient Name: Kelvin Flores    Date: 2025    : 1985  Insurance: Payor: [facility] MEDICAID / Plan: [facility] [employer] PLAN CARDINAL CARE / Product Type: *No Product type* /      Patient  verified yes     Visit #   Current / Total 22 25   Time   In / Out 1030 1110   Pain   In / Out 0 0   Subjective Functional Status/Changes: I      TREATMENT AREA =  Pain in right hip [M25.551]      Next PN/ RC due 3/18/25  Auth due 16V FROM 25-3/28/25         OBJECTIVE    Therapeutic Procedures:  Tx Min Billable or 1:1 Min (if diff from Tx Min) Procedure, Rationale, Specifics   12  48636 Therapeutic Exercise (timed):  increase ROM, strength, coordination, balance, and proprioception to improve patient's ability to progress to PLOF and address remaining functional goals. (see flow sheet as applicable)     Details if applicable:       12  75559 Therapeutic Activity (timed):  use of dynamic activities replicating functional movements to increase ROM, strength, coordination, balance, and proprioception in order to improve patient's ability to progress to PLOF and address remaining functional goals.  (see flow sheet as applicable)     Details if applicable:     8  74209 Neuromuscular Re-Education (timed):  improve balance, coordination, kinesthetic sense, posture, core stability and proprioception to improve patient's ability to develop conscious control of individual muscles and awareness of position of extremities in order to progress to PLOF and address remaining functional goals. (see flow sheet as applicable)     Details if applicable:     8  83977 Gait Training (timed):    To improve safety and dynamic movement with household/community ambulation.  (see flow sheet as applicable)     Details if applicable:     40  [facility] Totals Reminder: bill using total billable min of TIMED therapeutic procedures (example: do not include dry needle or estim unattended, both untimed

## 2025-02-24 ENCOUNTER — HOSPITAL ENCOUNTER (OUTPATIENT)
Facility: HOSPITAL | Age: 40
Setting detail: RECURRING SERIES
Discharge: HOME | End: 2025-02-27
Payer: MEDICAID

## 2025-02-24 PROCEDURE — 97116 GAIT TRAINING THERAPY: CPT

## 2025-02-24 PROCEDURE — 97535 SELF CARE MNGMENT TRAINING: CPT

## 2025-02-24 PROCEDURE — 97112 NEUROMUSCULAR REEDUCATION: CPT

## 2025-02-24 PROCEDURE — 97110 THERAPEUTIC EXERCISES: CPT

## 2025-02-24 PROCEDURE — 97530 THERAPEUTIC ACTIVITIES: CPT

## 2025-02-24 NOTE — PROGRESS NOTES
PHYSICAL  DAILY TREATMENT NOTE     Patient Name: Kelvin Flores    Date: 2025    : 1985  Insurance: Payor: [facility] MEDICAID / Plan: [facility] PLAN CARDINAL CARE / Product Type: *No Product type* /      Patient  verified Yes     Visit #   Current / Total 23 25   Time   In / Out 725 828   Pain   In / Out 0/10 0/10   Subjective Functional Status/Changes: Pt reports she raked for about an hour and has elevated R hip radic sxs,         Next PN/ RC due 3/18/25  Auth due 16V FROM 25-3/28/25    TREATMENT AREA =  Pain in right hip [M25.551]    OBJECTIVE    Modalities Rationale:     decrease pain to improve patient's ability to progress to PLOF and address remaining functional goals.    10 min  unbill []  Ice     [x]  Heat    location/position: prone with wedge under B Les     Skin assessment post-treatment:   Intact      Therapeutic Procedures:    Tx Min Billable or 1:1 Min (if diff from Tx Min) Procedure, Rationale, Specifics   12  71675 Therapeutic Exercise (timed):  increase ROM, strength, coordination, balance, and proprioception to improve patient's ability to progress to PLOF and address remaining functional goals. (see flow sheet as applicable)     Details if applicable:       10  58302 Neuromuscular Re-Education (timed):  improve balance, coordination, kinesthetic sense, posture, core stability and proprioception to improve patient's ability to develop conscious control of individual muscles and awareness of position of extremities in order to progress to PLOF and address remaining functional goals. (see flow sheet as applicable)     Details if applicable:   10  11151 Gait Training (timed):   (I) for HK+  suitcase carry x 60' ea UE with 15# x 2  (I) c butt kicks/retro/SS amb x 60' c 15#  (I) with tandem amb x 60'    Cuing for core and upright posture   To improve safety and dynamic movement with household/community ambulation.  (see flow sheet as applicable)      8  04593 Self

## 2025-02-26 ENCOUNTER — HOSPITAL ENCOUNTER (OUTPATIENT)
Facility: HOSPITAL | Age: 40
Setting detail: RECURRING SERIES
Discharge: HOME | End: 2025-03-01
Payer: MEDICAID

## 2025-02-26 PROCEDURE — 97110 THERAPEUTIC EXERCISES: CPT

## 2025-02-26 PROCEDURE — 97116 GAIT TRAINING THERAPY: CPT

## 2025-02-26 PROCEDURE — 97530 THERAPEUTIC ACTIVITIES: CPT

## 2025-02-26 PROCEDURE — 97112 NEUROMUSCULAR REEDUCATION: CPT

## 2025-02-26 PROCEDURE — 97535 SELF CARE MNGMENT TRAINING: CPT

## 2025-02-26 NOTE — PROGRESS NOTES
PHYSICAL  DAILY TREATMENT NOTE     Patient Name: Kelvin Flores    Date: 2025    : 1985  Insurance: Payor: [facility] MEDICAID / Plan: [facility] COMMUNITY PLAN CARDINAL CARE / Product Type: *No Product type* /      Patient  verified Yes     Visit #   Current / Total 24 24   Time   In / Out 807 902   Pain   In / Out 0/10 010   Subjective Functional Status/Changes:  Pt reports 90% improvement overall since SOC         Next PN/ RC due 3/18/25  Auth due 16V FROM 25-3/28/25    TREATMENT AREA =  Pain in right hip [M25.551]    OBJECTIVE    Modalities Rationale:     decrease pain to improve patient's ability to progress to PLOF and address remaining functional goals.    10 min  unbill []  Ice     [x]  Heat    location/position: prone with wedge under B Les     Skin assessment post-treatment:   Intact      Therapeutic Procedures:    Tx Min Billable or 1:1 Min (if diff from Tx Min) Procedure, Rationale, Specifics   11  81548 Therapeutic Exercise (timed):  increase ROM, strength, coordination, balance, and proprioception to improve patient's ability to progress to PLOF and address remaining functional goals. (see flow sheet as applicable)     Details if applicable:       8  45597 Neuromuscular Re-Education (timed):  improve balance, coordination, kinesthetic sense, posture, core stability and proprioception to improve patient's ability to develop conscious control of individual muscles and awareness of position of extremities in order to progress to PLOF and address remaining functional goals. (see flow sheet as applicable)     Details if applicable:   8  35020 Gait Training (timed):   (I) for HK+  suitcase carry x 60' ea UE with 20# x 1  (I) c butt kicks/retro/SS amb x 60' c 20#    Cuing for core and upright posture   To improve safety and dynamic movement with household/community ambulation.  (see flow sheet as applicable)      8  11205 Self Care/Home Management (timed):  improve patient knowledge and  Was The Patient On Physician Recommended Anticoagulation Therapy?: Please Select the Appropriate Response

## 2025-02-26 NOTE — PROGRESS NOTES
[facility]  [address] [place] [address]   Phone: [phone] Fax: [phone]  DISCHARGE SUMMARY  Patient Name: Kelvin Flores : 1985   Treatment/Medical Diagnosis: Pain in right hip [M25.551]   Referral Source: Jayce Lui PA-C     Date of Initial Visit: 11/15/24 Attended Visits: 24 Missed Visits: 0     SUMMARY OF TREATMENT  Treatment has consisted of stretching and strengthening exercises for B LE's, dynamic gait exercises, balance training, patient education, and home exercise program.   CURRENT STATUS  Kelvin has made excellent progress as evidence by achieving all LTGs. Pt reports 90% improvement overall since SOC. Pt reports (I) + compliance with HEP with no concerns or questions at this time.   (I) negotiating up and down 3 steps x 4  with no HandRails safely with reciprocal pattern. Pt Demos proper, p! free squat with symmetrical WBing with 20# KB. Pt also increase hip ext strength on B Sides from 4+/5 R/ 4/5 L to 5/5 R/ 4+/5 L.     Progress to Goals:   1. Patient to be Safe and Independent with HEP to self-manage/prevent symptoms after DC.MET      RECOMMENDATIONS  Pt has achieved all LTGs. Pt D/C with instructions to continue HEP Independently.    If you have any questions/comments please contact us directly.  Thank you for allowing us to assist in the care of your patient.    Elvia Craig, PTA       2025       5:36 PM    Tone \"BJ\" JENNIFER Euceda, Cert. MDT, Cert. DN, Cert. SMT, Dip. Osteopractic

## 2025-03-11 ENCOUNTER — HOSPITAL ENCOUNTER (OUTPATIENT)
Facility: HOSPITAL | Age: 40
Discharge: HOME | End: 2025-03-14

## 2025-03-11 ENCOUNTER — CLINICAL DOCUMENTATION (OUTPATIENT)
Facility: HOSPITAL | Age: 40
End: 2025-03-11

## 2025-03-11 NOTE — PROGRESS NOTES
FILOMENA reached out to patient to discuss previously set SMART goals.  Patient has done well with lifestyle changes.    Sleeve  04/19/2023     She shares she will have skin removal on March 25th.    Goal weight is 185#    Future SMART goals are:   Decrease honey in tea.  Mindfully eat.  6,000 steps a day.    We are set to talk in the future.  Nadja Olivera RD

## 2025-04-07 ENCOUNTER — OFFICE VISIT (OUTPATIENT)
Age: 40
End: 2025-04-07
Payer: MEDICAID

## 2025-04-07 VITALS
SYSTOLIC BLOOD PRESSURE: 127 MMHG | HEIGHT: 62 IN | OXYGEN SATURATION: 99 % | RESPIRATION RATE: 20 BRPM | TEMPERATURE: 96.5 F | WEIGHT: 203 LBS | DIASTOLIC BLOOD PRESSURE: 88 MMHG | HEART RATE: 92 BPM | BODY MASS INDEX: 37.36 KG/M2

## 2025-04-07 DIAGNOSIS — E66.9 OBESITY (BMI 30-39.9): ICD-10-CM

## 2025-04-07 DIAGNOSIS — K91.2 POSTGASTRECTOMY MALABSORPTION: ICD-10-CM

## 2025-04-07 DIAGNOSIS — E55.9 VITAMIN D DEFICIENCY: Primary | ICD-10-CM

## 2025-04-07 DIAGNOSIS — Z98.890 S/P PANNICULECTOMY: ICD-10-CM

## 2025-04-07 DIAGNOSIS — Z90.3 S/P GASTRIC SLEEVE PROCEDURE: ICD-10-CM

## 2025-04-07 DIAGNOSIS — Z90.3 POSTGASTRECTOMY MALABSORPTION: ICD-10-CM

## 2025-04-07 PROCEDURE — 99214 OFFICE O/P EST MOD 30 MIN: CPT | Performed by: REGISTERED NURSE

## 2025-04-07 ASSESSMENT — ENCOUNTER SYMPTOMS
NAUSEA: 0
SHORTNESS OF BREATH: 0
CHEST TIGHTNESS: 0

## 2025-04-07 NOTE — PROGRESS NOTES
Bariatric Postoperative Progress Note    Chief Complaint   Patient presents with    Follow-up     Weight check, annual follow up, LSG 4/19/2023     Kelvin Flores is a 40 y.o. female now 2 years status post laparoscopic sleeve gastrectomy performed on 4/19/23.    Here for annual follow up and weight check. Had panniculectomy performed end of March 2025. Pleased with results and recovery.     Goal weight: 175-180 lbs     Last Surgical Weight Loss:      4/7/2025     9:59 AM 4/7/2025     9:48 AM   Surgical Weight Loss Tracker   Date 4/7/2025    Visit Type  Follow Up Visit    Height 5' 2\" 5' 2\"   Initial Weight 271 lb 274 lb   Initial BMI 49.6 50.1   Ideal Body Weight 131 lb 131 lb   Initial Excess Body Weight (EBW) 140 lb 143 lb   Surgery Date  4/19/2023   Pre-Surgical Weight  271 lb   Pre Surgery BMI  49.6   Preop Weight Change  3 lb   Preop % Weight Change  1%   Pre-Surgical EBW  8 lb 12 oz   Date 4/7/2025    Weight 203 lb    BMI 37.1    Wt Change Since Last Visit -68 lb    Total Wt Change Since Surgery -68 lb    % EBWL 49%       Comorbidities:  Hypertension: resolved  Diabetes: not applicable hx of prediabetes   Obstructive Sleep Apnea: resolved  Hyperlipidemia: resolved  Stress Urinary Incontinence: not applicable  Gastroesophageal Reflux: not applicable  Weight related arthropathy: improved      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    Autoimmune disease 2014/2015    Endometriosis    Chronic pain October 2021    In left hip due to osteoarthritis    Depression Since early 20's    Deviated septum 01/01/2012    HTN (hypertension)     Multiple nasal polyps 01/01/2012    removed     JESSICA on CPAP      Past Surgical History:   Procedure Laterality Date    SLEEVE GASTRECTOMY N/A 4/19/2023    ROBOTIC ASSISTED LAPAROSCOPIC SLEEVE GASTRECTOMY WITH LIVER WEDGE BIOPSY, and HIATAL HERNIA REPAIR performed by Indio Sarmiento MD at [facility] MAIN OR

## 2025-04-07 NOTE — PROGRESS NOTES
Bariatric Postoperative Nurse Note    Kelvin NURIS Flores is a 40 y.o. female status post laparoscopic sleeve gastrectomy performed on 4/19/2023.    All Post-Ops (including two weeks)  -# of grams of protein daily? 40 grams  -sources of protein? Tuna, salmon, baked fish, protein shakes.   -# of oz of sugar free fluids from all sources daily? 64 plus oz  -Nausea? Yes, had skin removal surgery, March 25  -Vomiting? No  -Difficulty swallow/food sticking? No  -Heartburn/regurgitation? No  -Character of bowel movements (diarrhea/constipation/bloody stools?) regular  -Which multivitamin product are you taking? Bariatric Advantage   -What dose and how frequently are you taking calcium citrate? 3 times daily   - from any iron-containing multivitamin by 2 hours? Yes  -Ulcer risk exposures:   NSAID No  Tobacco No  Alcohol No  Steroids No  -Minutes of physical activity and what type? Stretches, yoga, walking--daily, 1 hour

## 2025-04-10 ENCOUNTER — OFFICE VISIT (OUTPATIENT)
Facility: CLINIC | Age: 40
End: 2025-04-10
Payer: MEDICAID

## 2025-04-10 VITALS
BODY MASS INDEX: 36.8 KG/M2 | TEMPERATURE: 97.8 F | WEIGHT: 200 LBS | HEART RATE: 82 BPM | DIASTOLIC BLOOD PRESSURE: 86 MMHG | OXYGEN SATURATION: 98 % | HEIGHT: 62 IN | SYSTOLIC BLOOD PRESSURE: 134 MMHG

## 2025-04-10 DIAGNOSIS — Z11.4 SCREENING FOR HUMAN IMMUNODEFICIENCY VIRUS WITHOUT PRESENCE OF RISK FACTORS: ICD-10-CM

## 2025-04-10 DIAGNOSIS — E78.00 PURE HYPERCHOLESTEROLEMIA: ICD-10-CM

## 2025-04-10 DIAGNOSIS — Z13.29 SCREENING FOR THYROID DISORDER: ICD-10-CM

## 2025-04-10 DIAGNOSIS — Z00.00 ENCOUNTER FOR WELL ADULT EXAM WITHOUT ABNORMAL FINDINGS: Primary | ICD-10-CM

## 2025-04-10 DIAGNOSIS — Z01.84 IMMUNITY STATUS TESTING: ICD-10-CM

## 2025-04-10 DIAGNOSIS — R73.03 PREDIABETES: ICD-10-CM

## 2025-04-10 DIAGNOSIS — I10 ESSENTIAL (PRIMARY) HYPERTENSION: ICD-10-CM

## 2025-04-10 PROCEDURE — 3075F SYST BP GE 130 - 139MM HG: CPT

## 2025-04-10 PROCEDURE — 3079F DIAST BP 80-89 MM HG: CPT

## 2025-04-10 PROCEDURE — 99395 PREV VISIT EST AGE 18-39: CPT

## 2025-04-10 SDOH — ECONOMIC STABILITY: FOOD INSECURITY: WITHIN THE PAST 12 MONTHS, THE FOOD YOU BOUGHT JUST DIDN'T LAST AND YOU DIDN'T HAVE MONEY TO GET MORE.: NEVER TRUE

## 2025-04-10 SDOH — ECONOMIC STABILITY: FOOD INSECURITY: WITHIN THE PAST 12 MONTHS, YOU WORRIED THAT YOUR FOOD WOULD RUN OUT BEFORE YOU GOT MONEY TO BUY MORE.: NEVER TRUE

## 2025-04-10 ASSESSMENT — PATIENT HEALTH QUESTIONNAIRE - PHQ9
9. THOUGHTS THAT YOU WOULD BE BETTER OFF DEAD, OR OF HURTING YOURSELF: NOT AT ALL
8. MOVING OR SPEAKING SO SLOWLY THAT OTHER PEOPLE COULD HAVE NOTICED. OR THE OPPOSITE, BEING SO FIGETY OR RESTLESS THAT YOU HAVE BEEN MOVING AROUND A LOT MORE THAN USUAL: SEVERAL DAYS
4. FEELING TIRED OR HAVING LITTLE ENERGY: SEVERAL DAYS
7. TROUBLE CONCENTRATING ON THINGS, SUCH AS READING THE NEWSPAPER OR WATCHING TELEVISION: SEVERAL DAYS
10. IF YOU CHECKED OFF ANY PROBLEMS, HOW DIFFICULT HAVE THESE PROBLEMS MADE IT FOR YOU TO DO YOUR WORK, TAKE CARE OF THINGS AT HOME, OR GET ALONG WITH OTHER PEOPLE: SOMEWHAT DIFFICULT
6. FEELING BAD ABOUT YOURSELF - OR THAT YOU ARE A FAILURE OR HAVE LET YOURSELF OR YOUR FAMILY DOWN: SEVERAL DAYS
SUM OF ALL RESPONSES TO PHQ QUESTIONS 1-9: 8
1. LITTLE INTEREST OR PLEASURE IN DOING THINGS: SEVERAL DAYS
SUM OF ALL RESPONSES TO PHQ QUESTIONS 1-9: 8
5. POOR APPETITE OR OVEREATING: SEVERAL DAYS
3. TROUBLE FALLING OR STAYING ASLEEP: SEVERAL DAYS
2. FEELING DOWN, DEPRESSED OR HOPELESS: SEVERAL DAYS

## 2025-04-10 NOTE — PROGRESS NOTES
Kelvin Flores is a 40 y.o. year old female who presents today for   Chief Complaint   Patient presents with    Annual Exam        \"Have you been to the ER, urgent care clinic since your last visit?  Hospitalized since your last visit?\"   NO     “Have you seen or consulted any other health care providers outside our system since your last visit?”   NO      “Have you had a pap smear?”    NO    Date of last Cervical Cancer screen (HPV or PAP): 7/13/2018             - [facility]  [facility]  Phone: [phone]  Fax: [phone]  
  Musculoskeletal:         General: No swelling, deformity or signs of injury. Normal range of motion.      Cervical back: Normal range of motion. No rigidity or tenderness.      Right lower leg: No edema.      Left lower leg: No edema.   Lymphadenopathy:      Head:      Right side of head: No submandibular or occipital adenopathy.      Left side of head: No submandibular or occipital adenopathy.      Cervical: No cervical adenopathy.      Upper Body:      Right upper body: No epitrochlear adenopathy.   Skin:     General: Skin is warm and dry.      Capillary Refill: Capillary refill takes less than 2 seconds.      Coloration: Skin is not jaundiced.      Findings: No bruising, erythema, lesion or rash.   Neurological:      General: No focal deficit present.      Mental Status: She is alert and oriented to person, place, and time. Mental status is at baseline.      Cranial Nerves: No cranial nerve deficit.      Sensory: No sensory deficit.      Motor: No weakness.      Coordination: Coordination normal.      Gait: Gait normal.   Psychiatric:         Mood and Affect: Mood normal.         Behavior: Behavior normal. Behavior is cooperative.         Thought Content: Thought content normal.         Judgment: Judgment normal.           Personalized Preventive Plan  Current Health Maintenance Status  Immunization History   Administered Date(s) Administered   • HPV Quadrivalent (Gardasil) 08/24/2009, 10/26/2009, 03/25/2010   • Influenza A (U0L7-26) Vaccine IM 03/25/2010   • Influenza Virus Vaccine 01/07/2009, 10/26/2009, 09/03/2015   • Influenza, FLUARIX, FLULAVAL, FLUZONE (age 6 mo+) and AFLURIA, (age 3 y+), Quadv PF, 0.5mL 09/03/2015   • PPD Test 10/02/2017        Health Maintenance Due   Topic Date Due   • Varicella vaccine (1 of 2 - 13+ 2-dose series) Never done   • Hepatitis B vaccine (1 of 3 - 19+ 3-dose series) Never done   • DTaP/Tdap/Td vaccine (1 - Tdap) Never done   • Cervical cancer screen  07/13/2023   • COVID-19

## 2025-04-11 LAB — HBA1C MFR BLD: 5.7 % (ref 4.8–5.6)

## 2025-04-12 LAB
APPEARANCE UR: ABNORMAL
BACTERIA #/AREA URNS HPF: ABNORMAL /[HPF]
BILIRUB UR QL STRIP: NEGATIVE
CASTS URNS QL MICRO: ABNORMAL /LPF
CHOLEST SERPL-MCNC: 166 MG/DL (ref 100–199)
COLOR UR: YELLOW
EPI CELLS #/AREA URNS HPF: ABNORMAL /HPF (ref 0–10)
GLUCOSE UR QL STRIP: NEGATIVE
HAV IGM SERPL QL IA: NEGATIVE
HBV CORE IGM SERPL QL IA: NEGATIVE
HBV SURFACE AG SERPL QL IA: NEGATIVE
HCV AB SERPL QL IA: NON REACTIVE
HCV AB SERPL QL IA: NORMAL
HDLC SERPL-MCNC: 46 MG/DL
HGB UR QL STRIP: NEGATIVE
HIV 2 RNA SERPL QL NAA+PROBE: NON REACTIVE
HIV1 RNA SERPL QL NAA+PROBE: NON REACTIVE
KETONES UR QL STRIP: NEGATIVE
LDLC SERPL CALC-MCNC: 108 MG/DL (ref 0–99)
LEUKOCYTE ESTERASE UR QL STRIP: ABNORMAL
MICRO URNS: ABNORMAL
NITRITE UR QL STRIP: NEGATIVE
PH UR STRIP: 6 [PH] (ref 5–7.5)
PROT UR QL STRIP: ABNORMAL
RBC #/AREA URNS HPF: ABNORMAL /HPF (ref 0–2)
SP GR UR STRIP: >=1.03 (ref 1–1.03)
T4 FREE SERPL-MCNC: 1.04 NG/DL (ref 0.82–1.77)
TRIGL SERPL-MCNC: 60 MG/DL (ref 0–149)
TSH SERPL DL<=0.005 MIU/L-ACNC: 2.53 UIU/ML (ref 0.45–4.5)
UROBILINOGEN UR STRIP-MCNC: 0.2 MG/DL (ref 0.2–1)
VLDLC SERPL CALC-MCNC: 12 MG/DL (ref 5–40)
WBC #/AREA URNS HPF: ABNORMAL /HPF (ref 0–5)

## 2025-04-21 ASSESSMENT — ENCOUNTER SYMPTOMS
DIARRHEA: 0
VOMITING: 0
CONSTIPATION: 0
EYES NEGATIVE: 1
COUGH: 0
WHEEZING: 0
NAUSEA: 0
CHEST TIGHTNESS: 0
BLOOD IN STOOL: 0
SORE THROAT: 0
TROUBLE SWALLOWING: 0
ABDOMINAL PAIN: 0
SHORTNESS OF BREATH: 0

## 2025-04-21 ASSESSMENT — VISUAL ACUITY: OU: 1

## 2025-05-15 ENCOUNTER — TELEMEDICINE (OUTPATIENT)
Facility: CLINIC | Age: 40
End: 2025-05-15
Payer: MEDICAID

## 2025-05-15 DIAGNOSIS — R73.01 IMPAIRED FASTING GLUCOSE: ICD-10-CM

## 2025-05-15 DIAGNOSIS — N30.00 ACUTE CYSTITIS WITHOUT HEMATURIA: Primary | ICD-10-CM

## 2025-05-15 PROCEDURE — 99214 OFFICE O/P EST MOD 30 MIN: CPT

## 2025-05-15 RX ORDER — NITROFURANTOIN 25; 75 MG/1; MG/1
100 CAPSULE ORAL 2 TIMES DAILY
Qty: 20 CAPSULE | Refills: 0 | Status: SHIPPED | OUTPATIENT
Start: 2025-05-15 | End: 2025-05-25

## 2025-05-15 ASSESSMENT — ENCOUNTER SYMPTOMS
TROUBLE SWALLOWING: 0
WHEEZING: 0
VOMITING: 0
BLOOD IN STOOL: 0
COUGH: 0
SORE THROAT: 0
SHORTNESS OF BREATH: 0
CONSTIPATION: 0
EYES NEGATIVE: 1
CHEST TIGHTNESS: 0
NAUSEA: 0
ABDOMINAL PAIN: 0
DIARRHEA: 0

## 2025-05-15 NOTE — PROGRESS NOTES
Kelvin Flores is a 40 y.o. year old female who presents today for   Chief Complaint   Patient presents with    Discuss Labs       \"Have you been to the ER, urgent care clinic since your last visit?  Hospitalized since your last visit?\"    no    “Have you seen or consulted any other health care providers outside our system since your last visit?”    no     “Have you had a pap smear?”    NO    Date of last Cervical Cancer screen (HPV or PAP): 7/13/2018           Click Here for Release of Records Request    - Cecilia Cullipher, LPN  [facility]  [facility]  Phone: [phone]  Fax: [phone]  
mouth 2 times daily for 10 days, Disp-20 capsule, R-0Normal  2. Impaired fasting glucose  Assessment & Plan:     Hemoglobin A1C   Date Value Ref Range Status   04/11/2025 5.7 (H) 4.8 - 5.6 % Final     Comment:                 Prediabetes: 5.7 - 6.4           Diabetes: >6.4           Glycemic control for adults with diabetes: <7.0        - If you have a diagnosis of impaired fasting glucose, or prediabetes, this means that you are at higher risk of developing diabetes  - Biannual A1C check if remaining stable and well controlled.  - Lifestyle modifications include eating a diet that has lots of vegetables, fruits, beans, nuts, and whole grains. Limits red and processed meats, unhealthy fats, sugar, salt, and alcohol.  - Increase your exercise  - If you get symptoms of unexplained drowsiness, frequent urination, constant hunger, or increased thirst, please notify your provider.             Return in about 2 weeks (around 5/29/2025) for POC U/A recheck- hematuria.      Patient was evaluated through a synchronous (real-time) audio-video encounter. The patient (or guardian if applicable) is aware that this is a billable service, which includes applicable co-pays. This Virtual Visit was conducted with patient's (and/or legal guardian's) consent. The visit was conducted pursuant to the emergency declaration under the [place] Act and the National Emergencies Act, 1135 waiver authority and the Coronavirus Preparedness and Response Supplemental Appropriations Act.  Patient identification was verified, and a caregiver was present when appropriate.  The patient was located at: Home: [address] [place] [id number]  The provider was located at: Facility (Appt Dept): [address], [address]  [place] [address]       I have discussed the diagnosis with the patient and the intended plan as seen in the above orders, as well as medication side effects and warnings.  The plan of care was reviewed with the patient,

## 2025-05-15 NOTE — ASSESSMENT & PLAN NOTE
Hemoglobin A1C   Date Value Ref Range Status   04/11/2025 5.7 (H) 4.8 - 5.6 % Final     Comment:                 Prediabetes: 5.7 - 6.4           Diabetes: >6.4           Glycemic control for adults with diabetes: <7.0        - If you have a diagnosis of impaired fasting glucose, or prediabetes, this means that you are at higher risk of developing diabetes  - Biannual A1C check if remaining stable and well controlled.  - Lifestyle modifications include eating a diet that has lots of vegetables, fruits, beans, nuts, and whole grains. Limits red and processed meats, unhealthy fats, sugar, salt, and alcohol.  - Increase your exercise  - If you get symptoms of unexplained drowsiness, frequent urination, constant hunger, or increased thirst, please notify your provider.

## 2025-06-10 ENCOUNTER — CLINICAL DOCUMENTATION (OUTPATIENT)
Facility: HOSPITAL | Age: 40
End: 2025-06-10

## 2025-06-10 ENCOUNTER — HOSPITAL ENCOUNTER (OUTPATIENT)
Facility: HOSPITAL | Age: 40
Discharge: HOME | End: 2025-06-13

## 2025-06-10 NOTE — PROGRESS NOTES
FILOMENA reached out to patient to discuss previously set SMART goals.  Patient has done well with lifestyle changes.    She has had a Panniculectomy and is now at 184#.  This is slightly below her goal weight.    She has purchased a [employer] ring which indicated her sleep needs improvement.       Future SMART goals are:   400 mg magnesium glycinate. Just before bedtime?   Strength training to music?     We are set to talk in the future.  Nadja Olivera RD

## 2025-06-11 ENCOUNTER — HOSPITAL ENCOUNTER (OUTPATIENT)
Facility: HOSPITAL | Age: 40
Setting detail: SPECIMEN
Discharge: HOME | End: 2025-06-14

## 2025-06-11 ENCOUNTER — OFFICE VISIT (OUTPATIENT)
Facility: CLINIC | Age: 40
End: 2025-06-11
Payer: MEDICAID

## 2025-06-11 VITALS
DIASTOLIC BLOOD PRESSURE: 82 MMHG | BODY MASS INDEX: 36.55 KG/M2 | SYSTOLIC BLOOD PRESSURE: 127 MMHG | HEIGHT: 62 IN | HEART RATE: 78 BPM | TEMPERATURE: 97.5 F | RESPIRATION RATE: 16 BRPM | OXYGEN SATURATION: 98 % | WEIGHT: 198.6 LBS

## 2025-06-11 DIAGNOSIS — R31.9 HEMATURIA, UNSPECIFIED TYPE: Primary | ICD-10-CM

## 2025-06-11 DIAGNOSIS — E55.9 VITAMIN D DEFICIENCY: ICD-10-CM

## 2025-06-11 DIAGNOSIS — Z90.3 S/P GASTRIC SLEEVE PROCEDURE: ICD-10-CM

## 2025-06-11 LAB
A/G RATIO: 1.6 RATIO (ref 1.1–2.6)
ALBUMIN: 4.1 G/DL (ref 3.5–5)
ALP BLD-CCNC: 55 U/L (ref 25–115)
ALT SERPL-CCNC: 9 U/L (ref 5–40)
ANION GAP SERPL CALCULATED.3IONS-SCNC: 10 MMOL/L (ref 3–15)
AST SERPL-CCNC: 12 U/L (ref 10–37)
BASOPHILS # BLD: 0 % (ref 0–2)
BASOPHILS ABSOLUTE: 0 K/UL (ref 0–0.2)
BILIRUB SERPL-MCNC: 0.3 MG/DL (ref 0.2–1.2)
BILIRUBIN, URINE, POC: NEGATIVE
BLOOD URINE, POC: NEGATIVE
BUN BLDV-MCNC: 10 MG/DL (ref 6–22)
CALCIUM SERPL-MCNC: 9 MG/DL (ref 8.4–10.5)
CHLORIDE BLD-SCNC: 105 MMOL/L (ref 98–110)
CO2: 25 MMOL/L (ref 20–32)
CREAT SERPL-MCNC: 0.7 MG/DL (ref 0.5–1.2)
EOSINOPHIL # BLD: 1 % (ref 0–6)
EOSINOPHILS ABSOLUTE: 0.1 K/UL (ref 0–0.5)
FERRITIN: 26 NG/ML (ref 10–291)
GFR, ESTIMATED: >90 ML/MIN/1.73 SQ.M.
GLOBULIN: 2.5 G/DL (ref 2–4)
GLUCOSE URINE, POC: NEGATIVE
GLUCOSE: 86 MG/DL (ref 70–99)
HCT VFR BLD CALC: 36.7 % (ref 35.1–46.5)
HEMOGLOBIN: 11.4 G/DL (ref 11.7–15.5)
IRON % SATURATION: 19 % (ref 20–50)
IRON: 51 MCG/DL (ref 30–160)
KETONES, URINE, POC: NEGATIVE
LEUKOCYTE ESTERASE, URINE, POC: NEGATIVE
LYMPHOCYTES # BLD: 46 % (ref 20–45)
LYMPHOCYTES ABSOLUTE: 2.1 K/UL (ref 1–4.8)
MCH RBC QN AUTO: 28 PG (ref 26–34)
MCHC RBC AUTO-ENTMCNC: 31 G/DL (ref 31–36)
MCV RBC AUTO: 89 FL (ref 80–99)
MONOCYTES ABSOLUTE: 0.3 K/UL (ref 0.1–1)
MONOCYTES: 7 % (ref 3–12)
NEUTROPHILS ABSOLUTE: 2 K/UL (ref 1.8–7.7)
NEUTROPHILS SEGMENTED: 45 % (ref 40–75)
NITRITE, URINE, POC: NEGATIVE
PDW BLD-RTO: 13.2 % (ref 10–15.5)
PH, URINE, POC: 7 (ref 4.6–8)
PLATELET # BLD: 298 K/UL (ref 140–440)
PMV BLD AUTO: 9.2 FL (ref 9–13)
POTASSIUM SERPL-SCNC: 4.4 MMOL/L (ref 3.5–5.5)
PROTEIN,URINE, POC: NEGATIVE
RBC # BLD: 4.11 M/UL (ref 3.8–5.2)
SODIUM BLD-SCNC: 140 MMOL/L (ref 133–145)
SPECIFIC GRAVITY, URINE, POC: 1.02 (ref 1–1.03)
TOTAL IRON BINDING CAPACITY: 269 MCG/DL (ref 228–428)
TOTAL PROTEIN: 6.6 G/DL (ref 6.4–8.3)
UNSATURATED IRON BINDING CAPACITY: 218 MCG/DL (ref 110–370)
URINALYSIS CLARITY, POC: CLEAR
URINALYSIS COLOR, POC: YELLOW
UROBILINOGEN, POC: NORMAL MG/DL
VITAMIN B-12: 686 PG/ML (ref 211–911)
VITAMIN D 25-HYDROXY: 42.3 NG/ML (ref 32–100)
WBC # BLD: 4.5 K/UL (ref 4–11)

## 2025-06-11 PROCEDURE — 99001 SPECIMEN HANDLING PT-LAB: CPT

## 2025-06-11 PROCEDURE — 81001 URINALYSIS AUTO W/SCOPE: CPT

## 2025-06-11 PROCEDURE — 99214 OFFICE O/P EST MOD 30 MIN: CPT

## 2025-06-11 RX ORDER — CLOTRIMAZOLE AND BETAMETHASONE DIPROPIONATE 10; .64 MG/G; MG/G
1 CREAM TOPICAL 2 TIMES DAILY
COMMUNITY
Start: 2025-05-30

## 2025-06-11 RX ORDER — SUMATRIPTAN 50 MG/1
50 TABLET, FILM COATED ORAL
COMMUNITY

## 2025-06-11 RX ORDER — DOXYCYCLINE HYCLATE 100 MG
100 TABLET ORAL 2 TIMES DAILY
COMMUNITY

## 2025-06-11 RX ORDER — ATORVASTATIN CALCIUM 20 MG/1
20 TABLET, FILM COATED ORAL DAILY
COMMUNITY

## 2025-06-11 RX ORDER — METOPROLOL TARTRATE 25 MG/1
25 TABLET, FILM COATED ORAL 2 TIMES DAILY
COMMUNITY

## 2025-06-11 RX ORDER — NYSTATIN 100000 [USP'U]/G
1 POWDER TOPICAL 4 TIMES DAILY PRN
COMMUNITY

## 2025-06-11 RX ORDER — URSODIOL 200 MG/1
1 CAPSULE ORAL 2 TIMES DAILY
COMMUNITY

## 2025-06-11 RX ORDER — FLUTICASONE PROPIONATE 50 MCG
2 SPRAY, SUSPENSION (ML) NASAL DAILY
COMMUNITY

## 2025-06-11 RX ORDER — CYCLOBENZAPRINE HCL 5 MG
5 TABLET ORAL 2 TIMES DAILY
COMMUNITY

## 2025-06-11 RX ORDER — QUETIAPINE FUMARATE 25 MG/1
50 TABLET, FILM COATED ORAL NIGHTLY
COMMUNITY

## 2025-06-11 NOTE — PROGRESS NOTES
Kelvin Flores is a 40 y.o. year old female who presents today for   Chief Complaint   Patient presents with    Hematuria     Follow-up       \"Have you been to the ER, urgent care clinic since your last visit?  Hospitalized since your last visit?\"    no    “Have you seen or consulted any other health care providers outside our system since your last visit?”    no     “Have you had a pap smear?”    YES - Where: [facility] Mar[facility]    Click Here for Release of Records Request    - Cecilia Cullipher, LPN  [facility]  [facility]  Phone: [phone]  Fax: [phone]

## 2025-06-12 LAB — SENTARA SPECIMEN COLLECTION: NORMAL

## 2025-06-24 ASSESSMENT — ENCOUNTER SYMPTOMS
SORE THROAT: 0
EYES NEGATIVE: 1
TROUBLE SWALLOWING: 0
DIARRHEA: 0
NAUSEA: 0
CHEST TIGHTNESS: 0
COUGH: 0
ABDOMINAL PAIN: 0
BLOOD IN STOOL: 0
VOMITING: 0
CONSTIPATION: 0
WHEEZING: 0
SHORTNESS OF BREATH: 0

## 2025-06-24 NOTE — PROGRESS NOTES
Patient ID: Kelvin Flores is a 40 y.o. female established patient presents for the following:      Subjective:     Primary historian: patient    Hematuria (Follow-up)       Hematuria  Pertinent negatives include no abdominal pain, chills, fever, flank pain, nausea or vomiting.          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    Autoimmune disease 2014/2015    Endometriosis    Chronic pain October 2021    In left hip due to osteoarthritis    Depression Since early 20's    Deviated septum 01/01/2012    HTN (hypertension)     Multiple nasal polyps 01/01/2012    removed     JESSICA on CPAP        Past Surgical History:   Procedure Laterality Date    SLEEVE GASTRECTOMY N/A 4/19/2023    ROBOTIC ASSISTED LAPAROSCOPIC SLEEVE GASTRECTOMY WITH LIVER WEDGE BIOPSY, and HIATAL HERNIA REPAIR performed by Indio Sarmiento MD at [facility] MAIN OR    TONSILLECTOMY  2008    WISDOM TOOTH EXTRACTION  2010       Current Outpatient Medications   Medication Sig Dispense Refill    atorvastatin (LIPITOR) 20 MG tablet Take 1 tablet by mouth daily      clotrimazole-betamethasone (LOTRISONE) 1-0.05 % cream Apply 1 each topically 2 times daily      cyclobenzaprine (FLEXERIL) 5 MG tablet Take 1 tablet by mouth 2 times daily      diclofenac sodium (VOLTAREN) 1 % GEL Apply 4 g topically 4 times daily as needed for Pain      doxycycline hyclate (VIBRA-TABS) 100 MG tablet Take 1 tablet by mouth 2 times daily      fluticasone (FLONASE) 50 MCG/ACT nasal spray 2 sprays by Nasal route daily      metFORMIN (GLUCOPHAGE) 500 MG tablet Take 1 tablet by mouth 2 times daily (with meals)      metoprolol tartrate (LOPRESSOR) 25 MG tablet Take 1 tablet by mouth 2 times daily      nystatin 380287 UNIT/GM powder Apply 1 g topically 4 times daily as needed      QUEtiapine (SEROQUEL) 25 MG tablet Take 2 tablets by mouth nightly      SUMAtriptan (IMITREX) 50 MG tablet Take 1 tablet by mouth once as needed

## (undated) DEVICE — DECANTER FLD 9IN ST BG FOR ASEP TRNSF OF FLD

## (undated) DEVICE — INSUFFLATION NEEDLE TO ESTABLISH PNEUMOPERITONEUM.: Brand: INSUFFLATION NEEDLE

## (undated) DEVICE — CANNULA SEAL

## (undated) DEVICE — DRAPE TOWEL: Brand: CONVERTORS

## (undated) DEVICE — REDUCER: Brand: ENDOWRIST

## (undated) DEVICE — KIT,ANTI FOG,W/SPONGE & FLUID,SOFT PACK: Brand: MEDLINE

## (undated) DEVICE — AIRSEAL BIFURCATED SMOKE EVAC FILTERED TUBE SET: Brand: AIRSEAL

## (undated) DEVICE — INTENDED FOR TISSUE SEPARATION, AND OTHER PROCEDURES THAT REQUIRE A SHARP SURGICAL BLADE TO PUNCTURE OR CUT.: Brand: BARD-PARKER SAFETY BLADES SIZE 15, STERILE

## (undated) DEVICE — SYRINGE MED 30ML STD CLR PLAS LUERLOCK TIP N CTRL DISP

## (undated) DEVICE — SEALANT TISS 10 CC FIBRIN VISTASEAL

## (undated) DEVICE — SEAL

## (undated) DEVICE — DISPOSABLE LAPAROSCOPIC CLIP APPLIER WITH 20 CLIPS.: Brand: EPIX® UNIVERSAL CLIP APPLIER

## (undated) DEVICE — VESSEL SEALER EXTEND: Brand: ENDOWRIST

## (undated) DEVICE — BANDAGE,GAUZE,BULKEE II,4.5"X4.1YD,STRL: Brand: MEDLINE

## (undated) DEVICE — TROCARS: Brand: KII® OPTICAL ACCESS SYSTEM

## (undated) DEVICE — Device

## (undated) DEVICE — VISIGI 3D®  CALIBRATION SYSTEM  SIZE 40FR STD W/ BULB: Brand: BOEHRINGER® VISIGI 3D™ SLEEVE GASTRECTOMY CALIBRATION SYSTEM, SIZE 40FR W/BULB

## (undated) DEVICE — TISSUE RETRIEVAL SYSTEM: Brand: INZII RETRIEVAL SYSTEM

## (undated) DEVICE — SOLUTION IV 1000ML 0.9% SOD CHL

## (undated) DEVICE — KIT SUTURING DEVICE M-CLOSE

## (undated) DEVICE — STAPLER 60 RELOAD WHITE: Brand: SUREFORM

## (undated) DEVICE — SOLUTION ANTIFOG VIS SYS CLEARIFY LAPSCP

## (undated) DEVICE — NEEDLE SPNL 20GA L3.5IN YEL HUB S STL REG WALL FIT STYL W/

## (undated) DEVICE — APPLICATOR LAP 35 CM 2 RIGID VISTASEAL

## (undated) DEVICE — SYRINGE MED 10ML LUERLOCK TIP W/O SFTY DISP

## (undated) DEVICE — COLUMN DRAPE

## (undated) DEVICE — 3M™ STERI-DRAPE™ INSTRUMENT POUCH 1018L: Brand: STERI-DRAPE™

## (undated) DEVICE — SUTURE ETHBND EXCEL SZ 2-0 L30IN NONABSORBABLE GRN L26MM SH X833H

## (undated) DEVICE — STERILE POLYISOPRENE POWDER-FREE SURGICAL GLOVES: Brand: PROTEXIS

## (undated) DEVICE — KIT CLN UP BON SECOURS MARYV

## (undated) DEVICE — SUTURE MCRYL SZ 4-0 L27IN ABSRB UD L24MM PS-1 3/8 CIR PRIM Y935H

## (undated) DEVICE — APPLICATOR MEDICATED 26 CC SOLUTION HI LT ORNG CHLORAPREP

## (undated) DEVICE — BLADELESS OBTURATOR: Brand: WECK VISTA

## (undated) DEVICE — SCISSORS ENDOSCP DIA5MM CRV MPLR CAUT W/ RATCH HNDL

## (undated) DEVICE — ADHESIVE SKIN CLOSURE 0.7CC TOP MICROBIAL APPL DERMBND ADV

## (undated) DEVICE — STAPLER 60 RELOAD BLUE: Brand: SUREFORM

## (undated) DEVICE — STAPLER 60: Brand: SUREFORM

## (undated) DEVICE — ARM DRAPE

## (undated) DEVICE — ELECTRODE PT RET AD L9FT HI MOIST COND ADH HYDRGEL CORDED